# Patient Record
Sex: FEMALE | Race: WHITE | NOT HISPANIC OR LATINO | URBAN - METROPOLITAN AREA
[De-identification: names, ages, dates, MRNs, and addresses within clinical notes are randomized per-mention and may not be internally consistent; named-entity substitution may affect disease eponyms.]

---

## 2017-05-16 ENCOUNTER — OUTPATIENT (OUTPATIENT)
Dept: OUTPATIENT SERVICES | Facility: HOSPITAL | Age: 72
LOS: 1 days | Discharge: HOME | End: 2017-05-16

## 2017-06-28 DIAGNOSIS — C56.9 MALIGNANT NEOPLASM OF UNSPECIFIED OVARY: ICD-10-CM

## 2017-10-11 ENCOUNTER — OUTPATIENT (OUTPATIENT)
Dept: OUTPATIENT SERVICES | Facility: HOSPITAL | Age: 72
LOS: 1 days | Discharge: HOME | End: 2017-10-11

## 2017-10-11 DIAGNOSIS — E11.9 TYPE 2 DIABETES MELLITUS WITHOUT COMPLICATIONS: ICD-10-CM

## 2017-10-11 DIAGNOSIS — C56.9 MALIGNANT NEOPLASM OF UNSPECIFIED OVARY: ICD-10-CM

## 2017-10-11 DIAGNOSIS — E13.10 OTHER SPECIFIED DIABETES MELLITUS WITH KETOACIDOSIS WITHOUT COMA: ICD-10-CM

## 2017-10-11 DIAGNOSIS — S09.90XA UNSPECIFIED INJURY OF HEAD, INITIAL ENCOUNTER: ICD-10-CM

## 2017-10-11 DIAGNOSIS — S32.000A WEDGE COMPRESSION FRACTURE OF UNSPECIFIED LUMBAR VERTEBRA, INITIAL ENCOUNTER FOR CLOSED FRACTURE: ICD-10-CM
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
## 2017-10-11 DIAGNOSIS — Z85.43 PERSONAL HISTORY OF MALIGNANT NEOPLASM OF OVARY: ICD-10-CM

## 2018-06-25 ENCOUNTER — EMERGENCY (EMERGENCY)
Facility: HOSPITAL | Age: 73
LOS: 0 days | Discharge: HOME | End: 2018-06-26
Attending: EMERGENCY MEDICINE | Admitting: EMERGENCY MEDICINE
Payer: MEDICARE

## 2018-06-25 VITALS
DIASTOLIC BLOOD PRESSURE: 75 MMHG | SYSTOLIC BLOOD PRESSURE: 158 MMHG | HEART RATE: 104 BPM | OXYGEN SATURATION: 95 % | TEMPERATURE: 98 F | RESPIRATION RATE: 20 BRPM | WEIGHT: 158.95 LBS

## 2018-06-25 DIAGNOSIS — R11.0 NAUSEA: ICD-10-CM

## 2018-06-25 DIAGNOSIS — I50.9 HEART FAILURE, UNSPECIFIED: ICD-10-CM

## 2018-06-25 DIAGNOSIS — Z88.0 ALLERGY STATUS TO PENICILLIN: ICD-10-CM

## 2018-06-25 DIAGNOSIS — I11.0 HYPERTENSIVE HEART DISEASE WITH HEART FAILURE: ICD-10-CM

## 2018-06-25 DIAGNOSIS — Z87.09 PERSONAL HISTORY OF OTHER DISEASES OF THE RESPIRATORY SYSTEM: ICD-10-CM

## 2018-06-25 DIAGNOSIS — E11.9 TYPE 2 DIABETES MELLITUS WITHOUT COMPLICATIONS: ICD-10-CM

## 2018-06-25 DIAGNOSIS — R06.02 SHORTNESS OF BREATH: ICD-10-CM

## 2018-06-25 DIAGNOSIS — Z79.84 LONG TERM (CURRENT) USE OF ORAL HYPOGLYCEMIC DRUGS: ICD-10-CM

## 2018-06-25 DIAGNOSIS — Z79.899 OTHER LONG TERM (CURRENT) DRUG THERAPY: ICD-10-CM

## 2018-06-25 LAB
ALBUMIN SERPL ELPH-MCNC: 4.2 G/DL — SIGNIFICANT CHANGE UP (ref 3.5–5.2)
ALP SERPL-CCNC: 82 U/L — SIGNIFICANT CHANGE UP (ref 30–115)
ALT FLD-CCNC: 34 U/L — SIGNIFICANT CHANGE UP (ref 0–41)
ANION GAP SERPL CALC-SCNC: 15 MMOL/L — HIGH (ref 7–14)
APTT BLD: 26.7 SEC — LOW (ref 27–39.2)
AST SERPL-CCNC: 33 U/L — SIGNIFICANT CHANGE UP (ref 0–41)
BILIRUB SERPL-MCNC: 0.4 MG/DL — SIGNIFICANT CHANGE UP (ref 0.2–1.2)
BUN SERPL-MCNC: 24 MG/DL — HIGH (ref 10–20)
CALCIUM SERPL-MCNC: 9.9 MG/DL — SIGNIFICANT CHANGE UP (ref 8.5–10.1)
CHLORIDE SERPL-SCNC: 101 MMOL/L — SIGNIFICANT CHANGE UP (ref 98–110)
CK MB CFR SERPL CALC: 3.2 NG/ML — SIGNIFICANT CHANGE UP (ref 0.6–6.3)
CK SERPL-CCNC: 113 U/L — SIGNIFICANT CHANGE UP (ref 0–225)
CO2 SERPL-SCNC: 25 MMOL/L — SIGNIFICANT CHANGE UP (ref 17–32)
CREAT SERPL-MCNC: 1 MG/DL — SIGNIFICANT CHANGE UP (ref 0.7–1.5)
GLUCOSE SERPL-MCNC: 128 MG/DL — HIGH (ref 70–99)
HCT VFR BLD CALC: 32.6 % — LOW (ref 37–47)
HGB BLD-MCNC: 9.8 G/DL — LOW (ref 12–16)
INR BLD: 1.06 RATIO — SIGNIFICANT CHANGE UP (ref 0.65–1.3)
LACTATE SERPL-SCNC: 1.6 MMOL/L — SIGNIFICANT CHANGE UP (ref 0.5–2.2)
MCHC RBC-ENTMCNC: 27.8 PG — SIGNIFICANT CHANGE UP (ref 27–31)
MCHC RBC-ENTMCNC: 30.1 G/DL — LOW (ref 32–37)
MCV RBC AUTO: 92.4 FL — SIGNIFICANT CHANGE UP (ref 81–99)
NRBC # BLD: 0 /100 WBCS — SIGNIFICANT CHANGE UP (ref 0–0)
NT-PROBNP SERPL-SCNC: 1327 PG/ML — HIGH (ref 0–300)
PLATELET # BLD AUTO: 186 K/UL — SIGNIFICANT CHANGE UP (ref 130–400)
POTASSIUM SERPL-MCNC: 4.7 MMOL/L — SIGNIFICANT CHANGE UP (ref 3.5–5)
POTASSIUM SERPL-SCNC: 4.7 MMOL/L — SIGNIFICANT CHANGE UP (ref 3.5–5)
PROT SERPL-MCNC: 6.8 G/DL — SIGNIFICANT CHANGE UP (ref 6–8)
PROTHROM AB SERPL-ACNC: 11.5 SEC — SIGNIFICANT CHANGE UP (ref 9.95–12.87)
RBC # BLD: 3.53 M/UL — LOW (ref 4.2–5.4)
RBC # FLD: 18.1 % — HIGH (ref 11.5–14.5)
SODIUM SERPL-SCNC: 141 MMOL/L — SIGNIFICANT CHANGE UP (ref 135–146)
TROPONIN T SERPL-MCNC: <0.01 NG/ML — SIGNIFICANT CHANGE UP
WBC # BLD: 7.63 K/UL — SIGNIFICANT CHANGE UP (ref 4.8–10.8)
WBC # FLD AUTO: 7.63 K/UL — SIGNIFICANT CHANGE UP (ref 4.8–10.8)

## 2018-06-25 PROCEDURE — 93970 EXTREMITY STUDY: CPT | Mod: 26

## 2018-06-25 RX ORDER — METFORMIN HYDROCHLORIDE 850 MG/1
1 TABLET ORAL
Qty: 0 | Refills: 0 | COMMUNITY

## 2018-06-25 RX ORDER — BENAZEPRIL HYDROCHLORIDE 40 MG/1
1 TABLET ORAL
Qty: 0 | Refills: 0 | COMMUNITY

## 2018-06-25 RX ORDER — INSULIN DETEMIR 100/ML (3)
0 INSULIN PEN (ML) SUBCUTANEOUS
Qty: 0 | Refills: 0 | COMMUNITY

## 2018-06-25 RX ORDER — FUROSEMIDE 40 MG
40 TABLET ORAL ONCE
Qty: 0 | Refills: 0 | Status: COMPLETED | OUTPATIENT
Start: 2018-06-25 | End: 2018-06-25

## 2018-06-25 NOTE — ED PROVIDER NOTE - NS ED ROS FT
Review of Systems    Constitutional: (-) fever  Cardiovascular: (-) chest pain, (-) syncope  Respiratory: (+) cough, (+) shortness of breath  Gastrointestinal: (-) vomiting, (-) diarrhea  Musculoskeletal: (-) neck pain, (-) back pain  Integumentary: (-) rash, (+) edema  Neurological: (-) headache

## 2018-06-25 NOTE — ED PROVIDER NOTE - MEDICAL DECISION MAKING DETAILS
Patient to be discharged from ED. Any available test results were discussed with patient and/or family. Verbal instructions given, including instructions to return to ED immediately for any new, worsening, or concerning symptoms. Patient endorsed understanding. Written discharge instructions additionally given, including follow-up plan.   telma vyas - will see in office tomorrow -  start  lasix 20mg

## 2018-06-25 NOTE — ED PROVIDER NOTE - PROGRESS NOTE DETAILS
I personally evaluated the patient. I reviewed the Resident’s or Physician Assistant’s note (as assigned above), and agree with the findings and plan except as documented in my note.  73yF hx of DM, ovarian CA and stomach CA, in remission, not currently on any chemotherapy presents to ED for difficulty breathing.  Pt recently diagnosed with bronchitis 2 weeks ago, no abx started at that time.  Pt over that time having increased cough, clear production.  Pt since yesterday difficulty breathing ad pain to left chest with coughing.  Pt contacted PMD, was advised ED visit for CXray.  Pt notes feet swelling over last 5 days.  (+)Chest heaviness.  (+)dyspnea on exertion over last 5 days.  No fever.  No abd pain.  No n/v/d.  No rash.  No new mdx.  ON EXAM: Pt is awake and alert, non toxic.  CVS RRR.  Lungs CTA b/l. no resp distress.  Abd soft nt/nd.  1+ b/l LE edema.  PLAN:  Labs, EKG, CXray.

## 2018-06-25 NOTE — ED PROVIDER NOTE - OBJECTIVE STATEMENT
74 y/o F with PMH DM, HTN presents with SOB, NORIEGA, nausea, b/l LE swelling x 5 days. +cough x wks. Denies CP, palpitations, back pain, abdominal pain, v/d, black or bloody stools, fevers, sweats, chills, HA, vision changes, sore throat/difficulty swallowing, trauma, fall, cough, recent travel, recent illness, sick contacts, leg pain, urinary symptoms, rash.

## 2018-06-25 NOTE — ED PROVIDER NOTE - PHYSICAL EXAMINATION
PHYSICAL EXAM:    GENERAL: Alert, appears stated age, well appearing, non-toxic  SKIN: Warm, pink and dry. MMM.   EYE: Normal lids/conjunctiva  ENT: Normal hearing, patent oropharynx without erythema or exudate  NECK: +supple. No meningismus, or JVD, +Trachea midline.   Pulm: Bilateral BS, normal resp effort, no wheezes, stridor, or retractions  CV: RRR, no M/R/G, 2+ pulses. no TTP of precordium   Abd: soft, non-tender, non-distended  Mskel: no erythema, cyanosis. +b/l LE 1-2+edema up to abdomen. no calf tenderness.   Neuro: AAOx3, 5/5 strength throughout. normal gait.

## 2018-06-26 VITALS
SYSTOLIC BLOOD PRESSURE: 131 MMHG | DIASTOLIC BLOOD PRESSURE: 77 MMHG | OXYGEN SATURATION: 96 % | TEMPERATURE: 98 F | RESPIRATION RATE: 16 BRPM | HEART RATE: 89 BPM

## 2018-06-26 RX ORDER — FUROSEMIDE 40 MG
1 TABLET ORAL
Qty: 5 | Refills: 0 | OUTPATIENT
Start: 2018-06-26 | End: 2018-06-30

## 2018-06-26 RX ADMIN — Medication 40 MILLIGRAM(S): at 00:20

## 2018-09-12 ENCOUNTER — OUTPATIENT (OUTPATIENT)
Dept: OUTPATIENT SERVICES | Facility: HOSPITAL | Age: 73
LOS: 1 days | Discharge: HOME | End: 2018-09-12

## 2018-09-12 DIAGNOSIS — R06.89 OTHER ABNORMALITIES OF BREATHING: ICD-10-CM

## 2018-09-12 PROBLEM — J40 BRONCHITIS, NOT SPECIFIED AS ACUTE OR CHRONIC: Chronic | Status: ACTIVE | Noted: 2018-06-25

## 2018-09-12 PROBLEM — E11.9 TYPE 2 DIABETES MELLITUS WITHOUT COMPLICATIONS: Chronic | Status: ACTIVE | Noted: 2018-06-25

## 2018-10-25 LAB — GLUCOSE BLDC GLUCOMTR-MCNC: 189 MG/DL — HIGH (ref 70–99)

## 2018-10-30 LAB — GLUCOSE BLDC GLUCOMTR-MCNC: 318 MG/DL — HIGH (ref 70–99)

## 2018-11-01 LAB
GLUCOSE BLDC GLUCOMTR-MCNC: 60 MG/DL — LOW (ref 70–99)
GLUCOSE BLDC GLUCOMTR-MCNC: 61 MG/DL — LOW (ref 70–99)
GLUCOSE BLDC GLUCOMTR-MCNC: 91 MG/DL — SIGNIFICANT CHANGE UP (ref 70–99)

## 2018-12-20 ENCOUNTER — OUTPATIENT (OUTPATIENT)
Dept: OUTPATIENT SERVICES | Facility: HOSPITAL | Age: 73
LOS: 1 days | Discharge: HOME | End: 2018-12-20

## 2018-12-20 DIAGNOSIS — I50.41 ACUTE COMBINED SYSTOLIC (CONGESTIVE) AND DIASTOLIC (CONGESTIVE) HEART FAILURE: ICD-10-CM

## 2019-01-15 ENCOUNTER — INPATIENT (INPATIENT)
Facility: HOSPITAL | Age: 74
LOS: 7 days | Discharge: NURSING, REHAB OR LONG-TERM CARE | End: 2019-01-23
Attending: COLON & RECTAL SURGERY | Admitting: COLON & RECTAL SURGERY
Payer: MEDICARE

## 2019-01-15 VITALS
OXYGEN SATURATION: 99 % | SYSTOLIC BLOOD PRESSURE: 95 MMHG | HEIGHT: 59 IN | RESPIRATION RATE: 18 BRPM | TEMPERATURE: 97 F | DIASTOLIC BLOOD PRESSURE: 55 MMHG | WEIGHT: 149.91 LBS | HEART RATE: 76 BPM

## 2019-01-15 LAB
ALBUMIN SERPL ELPH-MCNC: 4.4 G/DL — SIGNIFICANT CHANGE UP (ref 3.5–5.2)
ALP SERPL-CCNC: 74 U/L — SIGNIFICANT CHANGE UP (ref 30–115)
ALT FLD-CCNC: 18 U/L — SIGNIFICANT CHANGE UP (ref 0–41)
ANION GAP SERPL CALC-SCNC: 13 MMOL/L — SIGNIFICANT CHANGE UP (ref 7–14)
APPEARANCE UR: CLEAR — SIGNIFICANT CHANGE UP
AST SERPL-CCNC: 20 U/L — SIGNIFICANT CHANGE UP (ref 0–41)
BASOPHILS # BLD AUTO: 0.04 K/UL — SIGNIFICANT CHANGE UP (ref 0–0.2)
BASOPHILS NFR BLD AUTO: 0.4 % — SIGNIFICANT CHANGE UP (ref 0–1)
BILIRUB SERPL-MCNC: <0.2 MG/DL — SIGNIFICANT CHANGE UP (ref 0.2–1.2)
BILIRUB UR-MCNC: NEGATIVE — SIGNIFICANT CHANGE UP
BUN SERPL-MCNC: 26 MG/DL — HIGH (ref 10–20)
CALCIUM SERPL-MCNC: 10.5 MG/DL — HIGH (ref 8.5–10.1)
CHLORIDE SERPL-SCNC: 94 MMOL/L — LOW (ref 98–110)
CO2 SERPL-SCNC: 31 MMOL/L — SIGNIFICANT CHANGE UP (ref 17–32)
COLOR SPEC: YELLOW — SIGNIFICANT CHANGE UP
CREAT SERPL-MCNC: 1.1 MG/DL — SIGNIFICANT CHANGE UP (ref 0.7–1.5)
DIFF PNL FLD: NEGATIVE — SIGNIFICANT CHANGE UP
EOSINOPHIL # BLD AUTO: 0.1 K/UL — SIGNIFICANT CHANGE UP (ref 0–0.7)
EOSINOPHIL NFR BLD AUTO: 1 % — SIGNIFICANT CHANGE UP (ref 0–8)
EPI CELLS # UR: ABNORMAL /HPF
GLUCOSE SERPL-MCNC: 57 MG/DL — LOW (ref 70–99)
GLUCOSE UR QL: NEGATIVE MG/DL — SIGNIFICANT CHANGE UP
HCT VFR BLD CALC: 31.7 % — LOW (ref 37–47)
HGB BLD-MCNC: 9.2 G/DL — LOW (ref 12–16)
IMM GRANULOCYTES NFR BLD AUTO: 0.4 % — HIGH (ref 0.1–0.3)
KETONES UR-MCNC: NEGATIVE — SIGNIFICANT CHANGE UP
LACTATE SERPL-SCNC: 3.2 MMOL/L — HIGH (ref 0.5–2.2)
LEUKOCYTE ESTERASE UR-ACNC: NEGATIVE — SIGNIFICANT CHANGE UP
LYMPHOCYTES # BLD AUTO: 3.76 K/UL — HIGH (ref 1.2–3.4)
LYMPHOCYTES # BLD AUTO: 36.2 % — SIGNIFICANT CHANGE UP (ref 20.5–51.1)
MCHC RBC-ENTMCNC: 24.9 PG — LOW (ref 27–31)
MCHC RBC-ENTMCNC: 29 G/DL — LOW (ref 32–37)
MCV RBC AUTO: 85.9 FL — SIGNIFICANT CHANGE UP (ref 81–99)
MONOCYTES # BLD AUTO: 0.77 K/UL — HIGH (ref 0.1–0.6)
MONOCYTES NFR BLD AUTO: 7.4 % — SIGNIFICANT CHANGE UP (ref 1.7–9.3)
NEUTROPHILS # BLD AUTO: 5.68 K/UL — SIGNIFICANT CHANGE UP (ref 1.4–6.5)
NEUTROPHILS NFR BLD AUTO: 54.6 % — SIGNIFICANT CHANGE UP (ref 42.2–75.2)
NITRITE UR-MCNC: NEGATIVE — SIGNIFICANT CHANGE UP
NRBC # BLD: 0 /100 WBCS — SIGNIFICANT CHANGE UP (ref 0–0)
PH UR: 8.5 — SIGNIFICANT CHANGE UP (ref 5–8)
PLATELET # BLD AUTO: 252 K/UL — SIGNIFICANT CHANGE UP (ref 130–400)
POTASSIUM SERPL-MCNC: 4.2 MMOL/L — SIGNIFICANT CHANGE UP (ref 3.5–5)
POTASSIUM SERPL-SCNC: 4.2 MMOL/L — SIGNIFICANT CHANGE UP (ref 3.5–5)
PROT SERPL-MCNC: 7 G/DL — SIGNIFICANT CHANGE UP (ref 6–8)
PROT UR-MCNC: 30 MG/DL
RBC # BLD: 3.69 M/UL — LOW (ref 4.2–5.4)
RBC # FLD: 17 % — HIGH (ref 11.5–14.5)
SODIUM SERPL-SCNC: 138 MMOL/L — SIGNIFICANT CHANGE UP (ref 135–146)
SP GR SPEC: 1.02 — SIGNIFICANT CHANGE UP (ref 1.01–1.03)
TROPONIN T SERPL-MCNC: <0.01 NG/ML — SIGNIFICANT CHANGE UP
UROBILINOGEN FLD QL: 0.2 MG/DL — SIGNIFICANT CHANGE UP (ref 0.2–0.2)
WBC # BLD: 10.39 K/UL — SIGNIFICANT CHANGE UP (ref 4.8–10.8)
WBC # FLD AUTO: 10.39 K/UL — SIGNIFICANT CHANGE UP (ref 4.8–10.8)
WBC UR QL: SIGNIFICANT CHANGE UP /HPF

## 2019-01-15 PROCEDURE — 99221 1ST HOSP IP/OBS SF/LOW 40: CPT

## 2019-01-15 PROCEDURE — 99223 1ST HOSP IP/OBS HIGH 75: CPT

## 2019-01-15 RX ORDER — HEPARIN SODIUM 5000 [USP'U]/ML
5000 INJECTION INTRAVENOUS; SUBCUTANEOUS EVERY 8 HOURS
Qty: 0 | Refills: 0 | Status: DISCONTINUED | OUTPATIENT
Start: 2019-01-15 | End: 2019-01-17

## 2019-01-15 RX ORDER — DEXTROSE 50 % IN WATER 50 %
50 SYRINGE (ML) INTRAVENOUS ONCE
Qty: 0 | Refills: 0 | Status: COMPLETED | OUTPATIENT
Start: 2019-01-15 | End: 2019-01-15

## 2019-01-15 RX ORDER — DEXTROSE 50 % IN WATER 50 %
25 SYRINGE (ML) INTRAVENOUS ONCE
Qty: 0 | Refills: 0 | Status: DISCONTINUED | OUTPATIENT
Start: 2019-01-15 | End: 2019-01-17

## 2019-01-15 RX ORDER — PANTOPRAZOLE SODIUM 20 MG/1
40 TABLET, DELAYED RELEASE ORAL DAILY
Qty: 0 | Refills: 0 | Status: DISCONTINUED | OUTPATIENT
Start: 2019-01-15 | End: 2019-01-17

## 2019-01-15 RX ORDER — MORPHINE SULFATE 50 MG/1
1 CAPSULE, EXTENDED RELEASE ORAL EVERY 6 HOURS
Qty: 0 | Refills: 0 | Status: DISCONTINUED | OUTPATIENT
Start: 2019-01-15 | End: 2019-01-17

## 2019-01-15 RX ORDER — LIDOCAINE HCL 20 MG/ML
8 VIAL (ML) INJECTION ONCE
Qty: 0 | Refills: 0 | Status: COMPLETED | OUTPATIENT
Start: 2019-01-15 | End: 2019-01-15

## 2019-01-15 RX ORDER — SODIUM CHLORIDE 9 MG/ML
1000 INJECTION, SOLUTION INTRAVENOUS
Qty: 0 | Refills: 0 | Status: DISCONTINUED | OUTPATIENT
Start: 2019-01-15 | End: 2019-01-17

## 2019-01-15 RX ORDER — SODIUM CHLORIDE 9 MG/ML
1000 INJECTION, SOLUTION INTRAVENOUS
Qty: 0 | Refills: 0 | Status: DISCONTINUED | OUTPATIENT
Start: 2019-01-15 | End: 2019-01-16

## 2019-01-15 RX ORDER — CHLORHEXIDINE GLUCONATE 213 G/1000ML
1 SOLUTION TOPICAL
Qty: 0 | Refills: 0 | Status: DISCONTINUED | OUTPATIENT
Start: 2019-01-15 | End: 2019-01-17

## 2019-01-15 RX ORDER — DEXTROSE 50 % IN WATER 50 %
12.5 SYRINGE (ML) INTRAVENOUS ONCE
Qty: 0 | Refills: 0 | Status: DISCONTINUED | OUTPATIENT
Start: 2019-01-15 | End: 2019-01-17

## 2019-01-15 RX ORDER — ONDANSETRON 8 MG/1
4 TABLET, FILM COATED ORAL ONCE
Qty: 0 | Refills: 0 | Status: COMPLETED | OUTPATIENT
Start: 2019-01-15 | End: 2019-01-15

## 2019-01-15 RX ORDER — METOPROLOL TARTRATE 50 MG
2.5 TABLET ORAL EVERY 8 HOURS
Qty: 0 | Refills: 0 | Status: DISCONTINUED | OUTPATIENT
Start: 2019-01-15 | End: 2019-01-17

## 2019-01-15 RX ORDER — GLUCAGON INJECTION, SOLUTION 0.5 MG/.1ML
1 INJECTION, SOLUTION SUBCUTANEOUS ONCE
Qty: 0 | Refills: 0 | Status: DISCONTINUED | OUTPATIENT
Start: 2019-01-15 | End: 2019-01-17

## 2019-01-15 RX ORDER — INSULIN LISPRO 100/ML
VIAL (ML) SUBCUTANEOUS
Qty: 0 | Refills: 0 | Status: DISCONTINUED | OUTPATIENT
Start: 2019-01-15 | End: 2019-01-17

## 2019-01-15 RX ORDER — SODIUM CHLORIDE 9 MG/ML
1000 INJECTION, SOLUTION INTRAVENOUS ONCE
Qty: 0 | Refills: 0 | Status: COMPLETED | OUTPATIENT
Start: 2019-01-15 | End: 2019-01-15

## 2019-01-15 RX ORDER — ONDANSETRON 8 MG/1
4 TABLET, FILM COATED ORAL EVERY 8 HOURS
Qty: 0 | Refills: 0 | Status: DISCONTINUED | OUTPATIENT
Start: 2019-01-15 | End: 2019-01-17

## 2019-01-15 RX ORDER — DEXTROSE 50 % IN WATER 50 %
15 SYRINGE (ML) INTRAVENOUS ONCE
Qty: 0 | Refills: 0 | Status: DISCONTINUED | OUTPATIENT
Start: 2019-01-15 | End: 2019-01-17

## 2019-01-15 RX ADMIN — SODIUM CHLORIDE 100 MILLILITER(S): 9 INJECTION, SOLUTION INTRAVENOUS at 15:31

## 2019-01-15 RX ADMIN — ONDANSETRON 4 MILLIGRAM(S): 8 TABLET, FILM COATED ORAL at 15:48

## 2019-01-15 RX ADMIN — Medication 8 MILLILITER(S): at 17:17

## 2019-01-15 RX ADMIN — ONDANSETRON 4 MILLIGRAM(S): 8 TABLET, FILM COATED ORAL at 23:20

## 2019-01-15 RX ADMIN — SODIUM CHLORIDE 1000 MILLILITER(S): 9 INJECTION, SOLUTION INTRAVENOUS at 15:30

## 2019-01-15 RX ADMIN — Medication 50 MILLILITER(S): at 14:27

## 2019-01-15 RX ADMIN — SODIUM CHLORIDE 1000 MILLILITER(S): 9 INJECTION, SOLUTION INTRAVENOUS at 13:19

## 2019-01-15 RX ADMIN — SODIUM CHLORIDE 100 MILLILITER(S): 9 INJECTION, SOLUTION INTRAVENOUS at 22:18

## 2019-01-15 NOTE — ED ADULT NURSE REASSESSMENT NOTE - NS ED NURSE REASSESS COMMENT FT1
Pt going to [facility]. Vitals WNL. Pt in no distress. Report given to [facility]. Pt waiting for ambulance

## 2019-01-15 NOTE — H&P ADULT - HISTORY OF PRESENT ILLNESS
HPI:   73y Female PMH of DMT2, CHF, Ovarian CA s/p BARON and BSO 2y ago, gastric CA s/p chemo follows at [facility] Oncologist Dr. Pratt, has chronically incarcerated ventral hernia containing loops of small bowel, presents with abdominal pain that has progressively worsened over 1 week with associated nausea, decreased appetite, and infrequent, small BM, no flatus. Patient had CT A/P demonstrating loops of dilated small bowel concerning for SBO, TP is seen in hernia neck, with edema in hernia sac. LA 3.2. No fevers, HD Stable. Patient poor historian, most of F/U at [facility], unsure of names of care providers. Not sure when last C-scope/endoscopy was.   Transferred to [place] for evaluation.    PAST MEDICAL & SURGICAL HISTORY:  Bronchitis  Diabetes mellitus, type 2

## 2019-01-15 NOTE — ED PROVIDER NOTE - OBJECTIVE STATEMENT
73 year old female with a pmh of dm chf h/o ovarian & stomach cancer (remission 2 year) presents here c/o abdominal pain x 1 week. Patient waiting several days before coming to the ED due the fact that she thought it was gas. Patient continued to have pain which promoted her to come to the ED. Patient admits to nausea but no vomiting and has been having bowel movements & passing gas. Denies fever chills cough cp urinary frequency urgency or burning

## 2019-01-15 NOTE — CONSULT NOTE ADULT - ATTENDING COMMENTS
Pt seen and examined in ED approx 1630hrs.  Pt alert and stable, still c/o crampy abd pain., some nausea, no emesis.  History is above, prior ovarian ca was Rx'ed with p.o. chemoRx but no RT.  Abd is obese and somewhat distended, minimal diffuse tenderness, active BS.  Very large chronically incarcerated hernia in midline extending to left, not tender, no erythema/crepitus/induration.  No CVAT, no other hernias noted.  CT reviewed with images, labs noted.  Plan is as stated above, will d/w surgical team @ [facility].

## 2019-01-15 NOTE — ED PROVIDER NOTE - PHYSICAL EXAMINATION
CONSTITUTIONAL: WA / WN / NAD  HEAD: NCAT  EYES: PERRL; EOMI;   ENT: Normal pharynx; mucous membranes pink/moist, no erythema.  NECK: Supple; no meningeal signs  CARD: RRR; nl S1/S2; no M/R/G.   RESP: Respiratory rate and effort are normal; breath sounds clear and equal bilaterally.  ABD: Soft, ND + suprapubic & llq ttp. No CVA tenderness  MSK/EXT: No gross deformities; full range of motion.  SKIN: Warm and dry;   NEURO: AAOx3  PSYCH: Memory Intact, Normal Affect

## 2019-01-15 NOTE — ED PROCEDURE NOTE - CPROC ED GASTRIC INTUB DETAIL1
The nasogastric tube (see size above) was inserted via the anatomic location.
The nasogastric tube (see size above) was inserted via the anatomic location.

## 2019-01-15 NOTE — H&P ADULT - NSHPPHYSICALEXAM_GEN_ALL_CORE
Gen: a&ox3  Lungs: clear b/l  Abd: soft, mild distention, large ventral hernia, mild tenderness, rectal exam negative, no skin changes, nonperitoneal

## 2019-01-15 NOTE — H&P ADULT - ATTENDING COMMENTS
74 yo female with extensive medical history and surgical history as above stated.  Developed pain at the incisional hernia site in her LLQ during PT.  CT with partial SBO and incarcerated hernia in her LLQ.    Abdomen is soft, NT, mildly distended. Mildly tender hernia sac in LLQ.  WC 10.3    A/P:    Incarcerated hernia, no signs of strangulation.  NGT, NPO, IVF.  ICU evaluation.  Surgery when optimized.  D/w Risks, benefits and consequences with patient and her niece Malinda.

## 2019-01-15 NOTE — ED PROVIDER NOTE - NS ED ROS FT
Constitutional: See HPI.  Eyes: No visual changes  ENMT:  No neck pain  Cardiac: No cp  Respiratory: No cough   GI: see hpi  : No dysuria, frequency or burning.   MS: No myalgia, muscle weakness, joint pain or back pain.  Neuro: No headache or weakness. No LOC.  Skin: No skin rash.

## 2019-01-15 NOTE — CONSULT NOTE ADULT - SUBJECTIVE AND OBJECTIVE BOX
Consultation Note  =====================================================    HPI:   73y Female PMH of DMT2, CHF, Ovarian CA s/p BARON and BSO 2y ago, gastric CA s/p chemo follows at [facility] Oncologist Dr. Pratt, has chronically incarcerated ventral hernia containing loops of small bowel, presents with abdominal pain that has progressively worsened over 1 week with associated nausea, decreased appetite, and infrequent, small BM, no flatus. Patient had CT A/P demonstrating loops of dilated small bowel concerning for SBO, TP is seen in hernia neck, with edema in hernia sac. LA 3.2. No fevers, HD Stable. Patient poor historian, most of F/U at [facility], unsure of names of care providers. Not sure when last C-scope/endoscopy was.     PAST MEDICAL & SURGICAL HISTORY:  Bronchitis  Diabetes mellitus, type 2    Home Meds: Home Medications:  benazepril 10 mg oral tablet: 1 tab(s) orally once a day (2018 18:49)  GlipiZIDE XL 10 mg oral tablet, extended release: 1 tab(s) orally once a day (2018 18:49)  Levemir FlexTouch:  (2018 18:49)  metFORMIN 1000 mg oral tablet: 1 tab(s) orally 2 times a day (2018 18:49)    Allergies: Allergies  penicillin (Rash)  Intolerances    Soc:   Denies Tobacco/EtOH/Substance use  Advanced Directives: Presumed Full Code     ROS:    REVIEW OF SYSTEMS  [x] A ten-point review of systems was otherwise negative except as noted.    CURRENT MEDICATIONS:   --------------------------------------------------------------------------------------  Gastrointestinal Medications  dextrose 5% + sodium chloride 0.45%. 1000 milliLiter(s) IV Continuous <Continuous>    Topical/Other Medications  lidocaine 2% Injection for Nebulization 8 milliLiter(s) Nebulizer once  --------------------------------------------------------------------------------------  VITAL SIGNS, INS/OUTS (last 24 hours):  --------------------------------------------------------------------------------------  ICU Vital Signs Last 24 Hrs  T(C): 36.5 (15 Cortes 2019 16:23), Max: 36.5 (15 Cortes 2019 16:23)  T(F): 97.7 (15 Cortes 2019 16:23), Max: 97.7 (15 Cortes 2019 16:23)  HR: 70 (15 Cortes 2019 13:31) (70 - 76)  BP: 140/62 (15 Cortes 2019 16:23) (95/55 - 140/62)  RR: 18 (15 Cortes 2019 16:23) (18 - 18)  SpO2: 99% (15 Cortes 2019 16:23) (99% - 99%)  --------------------------------------------------------------------------------------  PHYSICAL EXAM  General: NAD, AAOx3, calm and cooperative  HEENT: NCAT, TEREZA, EOMI, Trachea ML, Neck supple  Cardiac: RRR S1, S2, no Murmurs, rubs or gallops  Respiratory: CTAB, normal respiratory effort, breath sounds equal BL, no wheeze, rhonchi or crackles  Abdomen: Soft, +distended, LLQ tenderness along lateral inferior aspect of hernia, +bowel sounds (hyperactive), no guarding/rigidity, no rebound  Skin: Warm/dry, normal color    LABS  --------------------------------------------------------------------------------------  CAPILLARY BLOOD GLUCOSE  POCT Blood Glucose.: 145 mg/dL (15 Cortes 2019 14:30)  POCT Blood Glucose.: 32 mg/dL (15 Cortes 2019 14:08)                          9.2    10.39 )-----------( 252      ( 15 Cortes 2019 10:30 )             31.7       Auto Neutrophil %: 54.6 % (01-15-19 @ 10:30)  Auto Immature Granulocyte %: 0.4 % (01-15-19 @ 10:30)    01-15  138  |  94<L>  |  26<H>  ----------------------------<  57<L>  4.2   |  31  |  1.1    Calcium, Total Serum: 10.5 mg/dL (01-15-19 @ 10:30)    LFTs:     7.0  | <0.2 | 20       ------------------[74      ( 15 Cortes 2019 10:30 )  4.4  | x    | 18          Lipase:x      Amylase:x        Lactate, Blood: 3.2 mmol/L (01-15-19 @ 10:30)  Coags:    CARDIAC MARKERS ( 15 Cortes 2019 10:30 )  x     / <0.01 ng/mL / x     / x     / x        Urinalysis Basic - ( 15 Cortes 2019 10:30 )  Color: Yellow / Appearance: Clear / S.020 / pH: x  Gluc: x / Ketone: Negative  / Bili: Negative / Urobili: 0.2 mg/dL   Blood: x / Protein: 30 mg/dL / Nitrite: Negative   Leuk Esterase: Negative / RBC: x / WBC 3-5 /HPF   Sq Epi: x / Non Sq Epi: Moderate /HPF / Bacteria: x    --------------------------------------------------------------------------------------  IMAGING RESULTS  < from: CT Abdomen and Pelvis w/ IV Cont (01.15.19 @ 14:10) >  IMPRESSION:   Multiple dilated loops of small bowel, leading up to a transition point   located within a large ventral wall abdominal hernia. Findings are   consistent with small bowel obstruction.  There is a 6 mm hypodensity at the junction of the body and tail the   pancreas, incompletely evaluated. This may represent a side branch I PMN.   However, further evaluation with pancreatic MRI with and without contrast   and MRCP imaging is recommended  < end of copied text >  ---------------------------------------------------------------------------------------

## 2019-01-15 NOTE — H&P ADULT - NSHPLABSRESULTS_GEN_ALL_CORE
9.2    10.39 )-----------( 252      ( 15 Cortes 2019 10:30 )             31.7   01-15    138  |  94<L>  |  26<H>  ----------------------------<  57<L>  4.2   |  31  |  1.1    Ca    10.5<H>      15 Cortes 2019 10:30    TPro  7.0  /  Alb  4.4  /  TBili  <0.2  /  DBili  x   /  AST  20  /  ALT  18  /  AlkPhos  74  01-15    Lactate 3.2    < from: CT Abdomen and Pelvis w/ IV Cont (01.15.19 @ 14:10) >    IMPRESSION:     Multiple dilated loops of small bowel, leading up to a transition point   located within a large ventral wall abdominal hernia. Findings are   consistent with small bowel obstruction.    There is a 6 mm hypodensity at the junction of the body and tail the   pancreas, incompletely evaluated. This may represent a side branch I PMN.   However, further evaluation with pancreatic MRI with and without contrast   and MRCP imaging is recommended    < end of copied text >

## 2019-01-15 NOTE — H&P ADULT - ASSESSMENT
74 yo F with SBO and t-point in large chronically incarcerated incisional hernia    Plan  Admit to surgery  NPO  IV hydration 100 LR/h  Repeat lactate and labs  Serial exams  Nonop mngt for now, might need ex lap and hernia repair in next 24-48h if no improvement  Evaluated and discussed with Dr. Alvarez  Discussed with ED

## 2019-01-15 NOTE — ED ADULT NURSE REASSESSMENT NOTE - NS ED NURSE REASSESS COMMENT FT1
2200: pt transferred from [facility] for admission for Small Bowel Obstruction. Pt was c/o abdominal pain  x 1 week and nausea, no vomiting. Pt received with # 20g placed in left AC from [facility]. Pt Alert/ oriented x 3.

## 2019-01-15 NOTE — CONSULT NOTE ADULT - ASSESSMENT
ASSESSMENT:  73y Female with multiple medical problems, chronically incarcerated ventral hernia, presents with SBO, transition point seen in hernia neck    PLAN:   Trial of non-operative management  NPO  IVF  NG tube to LCS  Strict I+O  Bolus additional 1L LR  Monitor for HD instability: Tachycardia/hypotension  Medical admission  Pre-op labs: T+S, PT/PTT/INR  EKG  CXR  Cardiac eval. Echo, CHF, follows with Dr. Garcia  Consider medical admission DM, CHF, multiple medical problems    ***Consideration of transfer North as patient may require emergent surgery if no improvement or worsening SBO, likely will require SICU care post operatively    Above plan discussed with Dr. Hyatt patient, and ED team    --------------------------------------------------------------------------------------    01-15-19 @ 17:05

## 2019-01-15 NOTE — ED PROVIDER NOTE - PROGRESS NOTE DETAILS
CT results noted, patient aware of results. Surgery PA aware of consult I personally evaluated the patient. I reviewed the Resident’s or Physician Assistant’s note (as assigned above), and agree with the findings and plan except as documented in my note. c/o abdominal pain. VS noted. Lower abd is distended and TTP, no rebound, no guarding. CT rev’d. Consistent with SBO. Surgery called Dr. Alanis evaluated patient, patient can be admitted to medicine. Spoke with Dr. Dorsey (hospitalist) aware of admission,. Spoke with JUVENTINO Montes from surgery who is requesting patient be transferred north for possible surgery. Jyoti will confirm with dr. Alicea whether to admit her north or transfer ED-ED Spoke with Dr. yip requesting patient be transferred to [place] ED for surgery eval there for possible bowel resection and possible SICU admission. Dr García aware of transfer north. Case endorsed to me by Dr. Hunter -- patient is pending transfer to [facility] ED for possible SICU admission in setting of small bowel obstruction As patient was beging ready to transport, NG tube fell out. NG tube placed again. [place] for [place]BO and possible surgery/SICU admission.  Surgery team called and aware of Pt arrival. Pt with NGT in place and placed to suction.  Pain controlled at this time.  Pt receiving IVF.  Per Dr. Avalos, surg resident - admit to floor under Dr. Alvarez.

## 2019-01-15 NOTE — ED PROCEDURE NOTE - ATTENDING CONTRIBUTION TO CARE
I was present for the key portions of the procedure and available as supervisor for the entire procedure as documented.
I was present for and supervised the key/critical aspects of the procedures performed during the care of the patient.

## 2019-01-16 LAB
ANION GAP SERPL CALC-SCNC: 13 MMOL/L — SIGNIFICANT CHANGE UP (ref 7–14)
APTT BLD: 30.5 SEC — SIGNIFICANT CHANGE UP (ref 27–39.2)
BASOPHILS # BLD AUTO: 0.04 K/UL — SIGNIFICANT CHANGE UP (ref 0–0.2)
BASOPHILS NFR BLD AUTO: 0.5 % — SIGNIFICANT CHANGE UP (ref 0–1)
BLD GP AB SCN SERPL QL: SIGNIFICANT CHANGE UP
BUN SERPL-MCNC: 18 MG/DL — SIGNIFICANT CHANGE UP (ref 10–20)
CALCIUM SERPL-MCNC: 9.4 MG/DL — SIGNIFICANT CHANGE UP (ref 8.5–10.1)
CHLORIDE SERPL-SCNC: 96 MMOL/L — LOW (ref 98–110)
CO2 SERPL-SCNC: 28 MMOL/L — SIGNIFICANT CHANGE UP (ref 17–32)
CREAT SERPL-MCNC: 0.9 MG/DL — SIGNIFICANT CHANGE UP (ref 0.7–1.5)
CULTURE RESULTS: NO GROWTH — SIGNIFICANT CHANGE UP
EOSINOPHIL # BLD AUTO: 0.08 K/UL — SIGNIFICANT CHANGE UP (ref 0–0.7)
EOSINOPHIL NFR BLD AUTO: 1 % — SIGNIFICANT CHANGE UP (ref 0–8)
ESTIMATED AVERAGE GLUCOSE: 169 MG/DL — HIGH (ref 68–114)
GLUCOSE BLDC GLUCOMTR-MCNC: 111 MG/DL — HIGH (ref 70–99)
GLUCOSE BLDC GLUCOMTR-MCNC: 120 MG/DL — HIGH (ref 70–99)
GLUCOSE BLDC GLUCOMTR-MCNC: 189 MG/DL — HIGH (ref 70–99)
GLUCOSE BLDC GLUCOMTR-MCNC: 56 MG/DL — LOW (ref 70–99)
GLUCOSE SERPL-MCNC: 79 MG/DL — SIGNIFICANT CHANGE UP (ref 70–99)
HBA1C BLD-MCNC: 7.5 % — HIGH (ref 4–5.6)
HCT VFR BLD CALC: 28.1 % — LOW (ref 37–47)
HGB BLD-MCNC: 8.4 G/DL — LOW (ref 12–16)
IMM GRANULOCYTES NFR BLD AUTO: 0.5 % — HIGH (ref 0.1–0.3)
INR BLD: 1 RATIO — SIGNIFICANT CHANGE UP (ref 0.65–1.3)
LACTATE SERPL-SCNC: 2 MMOL/L — SIGNIFICANT CHANGE UP (ref 0.5–2.2)
LYMPHOCYTES # BLD AUTO: 2.83 K/UL — SIGNIFICANT CHANGE UP (ref 1.2–3.4)
LYMPHOCYTES # BLD AUTO: 36.9 % — SIGNIFICANT CHANGE UP (ref 20.5–51.1)
MAGNESIUM SERPL-MCNC: 1.9 MG/DL — SIGNIFICANT CHANGE UP (ref 1.8–2.4)
MCHC RBC-ENTMCNC: 25.5 PG — LOW (ref 27–31)
MCHC RBC-ENTMCNC: 29.9 G/DL — LOW (ref 32–37)
MCV RBC AUTO: 85.4 FL — SIGNIFICANT CHANGE UP (ref 81–99)
MONOCYTES # BLD AUTO: 0.71 K/UL — HIGH (ref 0.1–0.6)
MONOCYTES NFR BLD AUTO: 9.3 % — SIGNIFICANT CHANGE UP (ref 1.7–9.3)
NEUTROPHILS # BLD AUTO: 3.96 K/UL — SIGNIFICANT CHANGE UP (ref 1.4–6.5)
NEUTROPHILS NFR BLD AUTO: 51.8 % — SIGNIFICANT CHANGE UP (ref 42.2–75.2)
NRBC # BLD: 0 /100 WBCS — SIGNIFICANT CHANGE UP (ref 0–0)
PHOSPHATE SERPL-MCNC: 2.6 MG/DL — SIGNIFICANT CHANGE UP (ref 2.1–4.9)
PLATELET # BLD AUTO: 193 K/UL — SIGNIFICANT CHANGE UP (ref 130–400)
POTASSIUM SERPL-MCNC: 3.9 MMOL/L — SIGNIFICANT CHANGE UP (ref 3.5–5)
POTASSIUM SERPL-SCNC: 3.9 MMOL/L — SIGNIFICANT CHANGE UP (ref 3.5–5)
PROTHROM AB SERPL-ACNC: 11.5 SEC — SIGNIFICANT CHANGE UP (ref 9.95–12.87)
RBC # BLD: 3.29 M/UL — LOW (ref 4.2–5.4)
RBC # FLD: 16.7 % — HIGH (ref 11.5–14.5)
SODIUM SERPL-SCNC: 137 MMOL/L — SIGNIFICANT CHANGE UP (ref 135–146)
SPECIMEN SOURCE: SIGNIFICANT CHANGE UP
TYPE + AB SCN PNL BLD: SIGNIFICANT CHANGE UP
WBC # BLD: 7.66 K/UL — SIGNIFICANT CHANGE UP (ref 4.8–10.8)
WBC # FLD AUTO: 7.66 K/UL — SIGNIFICANT CHANGE UP (ref 4.8–10.8)

## 2019-01-16 RX ORDER — SODIUM CHLORIDE 9 MG/ML
1000 INJECTION, SOLUTION INTRAVENOUS
Qty: 0 | Refills: 0 | Status: DISCONTINUED | OUTPATIENT
Start: 2019-01-16 | End: 2019-01-17

## 2019-01-16 RX ADMIN — Medication 2.5 MILLIGRAM(S): at 23:26

## 2019-01-16 RX ADMIN — SODIUM CHLORIDE 50 MILLILITER(S): 9 INJECTION, SOLUTION INTRAVENOUS at 08:31

## 2019-01-16 RX ADMIN — SODIUM CHLORIDE 100 MILLILITER(S): 9 INJECTION, SOLUTION INTRAVENOUS at 08:18

## 2019-01-16 RX ADMIN — PANTOPRAZOLE SODIUM 40 MILLIGRAM(S): 20 TABLET, DELAYED RELEASE ORAL at 13:19

## 2019-01-16 RX ADMIN — MORPHINE SULFATE 1 MILLIGRAM(S): 50 CAPSULE, EXTENDED RELEASE ORAL at 01:05

## 2019-01-16 RX ADMIN — Medication 0.62 MILLIGRAM(S): at 01:05

## 2019-01-16 RX ADMIN — MORPHINE SULFATE 1 MILLIGRAM(S): 50 CAPSULE, EXTENDED RELEASE ORAL at 23:12

## 2019-01-16 RX ADMIN — MORPHINE SULFATE 1 MILLIGRAM(S): 50 CAPSULE, EXTENDED RELEASE ORAL at 13:19

## 2019-01-16 RX ADMIN — Medication 0.62 MILLIGRAM(S): at 06:12

## 2019-01-16 RX ADMIN — MORPHINE SULFATE 1 MILLIGRAM(S): 50 CAPSULE, EXTENDED RELEASE ORAL at 23:45

## 2019-01-16 RX ADMIN — SODIUM CHLORIDE 100 MILLILITER(S): 9 INJECTION, SOLUTION INTRAVENOUS at 05:13

## 2019-01-16 RX ADMIN — Medication 2.5 MILLIGRAM(S): at 06:13

## 2019-01-16 RX ADMIN — MORPHINE SULFATE 1 MILLIGRAM(S): 50 CAPSULE, EXTENDED RELEASE ORAL at 13:39

## 2019-01-16 RX ADMIN — Medication 0.62 MILLIGRAM(S): at 13:18

## 2019-01-16 NOTE — CONSULT NOTE ADULT - ASSESSMENT
72 yo F with hx of ovarian cancer,last chemo in 2017,in remission since then.  chronic anemia,on procrit weekly  admitted with abdo pain ,found to have   SBO and t-point in large chronically incarcerated incisional hernia    Plan  keep NPO as per Sx  on iv hydration 100 LR/h  monitor electrolytes  Serial exams  start on procrit 40,000 unit qweekly,due today  cont other supportive care.  will need MRCP to evaluate pancreatic lesion,can be done as outpt when acute issue is resolved.  cont other supportive care as per Sx.  will f/u

## 2019-01-16 NOTE — PATIENT PROFILE ADULT - NSPROMUTANXFEARADDRESSFT_GEN_A_NUR
Spoke with her about the process of surgery and explained to her that the dr and lucrecia will discuss the whole process.

## 2019-01-16 NOTE — CONSULT NOTE ADULT - SUBJECTIVE AND OBJECTIVE BOX
Patient is a 73y old  Female who presents with a chief complaint of Abdominal pain (16 Jan 2019 01:02)      HPI:  HPI:   73y Female PMH of DMT2, CHF, Ovarian CA s/p BARON and BSO 2y ago, gastric CA s/p chemo follows at [facility] Oncologist Dr. Pratt, has chronically incarcerated ventral hernia containing loops of small bowel, presents with abdominal pain that has progressively worsened over 1 week with associated nausea, decreased appetite, and infrequent, small BM, no flatus. Patient had CT A/P demonstrating loops of dilated small bowel concerning for SBO, TP is seen in hernia neck, with edema in hernia sac. LA 3.2. No fevers, HD Stable. Patient poor historian, most of F/U at [facility], unsure of names of care providers. Not sure when last C-scope/endoscopy was.   Transferred to [place] for evaluation.    PAST MEDICAL & SURGICAL HISTORY:  Bronchitis  Diabetes mellitus, type 2 (15 Cortes 2019 21:17)       ROS:  Negative except for:abdominal pain    PAST MEDICAL & SURGICAL HISTORY:  Bronchitis  Diabetes mellitus, type 2  gastric cancer,ovarian cancer      SOCIAL HISTORY:    FAMILY HISTORY:      MEDICATIONS  (STANDING):  chlorhexidine 4% Liquid 1 Application(s) Topical <User Schedule>  dextrose 5% + sodium chloride 0.45%. 1000 milliLiter(s) (100 mL/Hr) IV Continuous <Continuous>  dextrose 5%. 1000 milliLiter(s) (50 mL/Hr) IV Continuous <Continuous>  dextrose 5%. 1000 milliLiter(s) (50 mL/Hr) IV Continuous <Continuous>  dextrose 50% Injectable 12.5 Gram(s) IV Push once  dextrose 50% Injectable 25 Gram(s) IV Push once  dextrose 50% Injectable 25 Gram(s) IV Push once  enalaprilat Injectable 0.625 milliGRAM(s) IV Push every 6 hours  heparin  Injectable 5000 Unit(s) SubCutaneous every 8 hours  insulin lispro (HumaLOG) corrective regimen sliding scale   SubCutaneous three times a day before meals  lactated ringers. 1000 milliLiter(s) (100 mL/Hr) IV Continuous <Continuous>  metoprolol tartrate Injectable 2.5 milliGRAM(s) IV Push every 8 hours  morphine  - Injectable 1 milliGRAM(s) IV Push every 6 hours  ondansetron Injectable 4 milliGRAM(s) IV Push every 8 hours  pantoprazole  Injectable 40 milliGRAM(s) IV Push daily    MEDICATIONS  (PRN):  dextrose 40% Gel 15 Gram(s) Oral once PRN Blood Glucose LESS THAN 70 milliGRAM(s)/deciliter  glucagon  Injectable 1 milliGRAM(s) IntraMuscular once PRN Glucose LESS THAN 70 milligrams/deciliter      Allergies    ciprofloxacin (Hives)  penicillin (Rash)    Intolerances        Vital Signs Last 24 Hrs  T(C): 36.1 (16 Jan 2019 13:06), Max: 36.1 (16 Jan 2019 00:05)  T(F): 97 (16 Jan 2019 13:06), Max: 97 (16 Jan 2019 13:06)  HR: 98 (16 Jan 2019 13:06) (81 - 101)  BP: 121/62 (16 Jan 2019 13:06) (102/51 - 121/62)  BP(mean): --  RR: 18 (16 Jan 2019 13:06) (18 - 20)  SpO2: 98% (16 Jan 2019 13:06) (95% - 100%)    PHYSICAL EXAM  General: adult in NAD  HEENT: clear oropharynx, anicteric sclera, pink conjunctiva  Neck: supple  CV: normal S1/S2 with no murmur rubs or gallops  Lungs: positive air movement b/l ant lungs,clear to auscultation, no wheezes, no rales  Abdomen: soft non-tender non-distended, no hepatosplenomegaly,+hernia  Ext: no clubbing cyanosis or edema  Skin: no rashes and no petechiae  Neuro: alert and oriented X 4, no focal deficits      LABS:                          8.4    7.66  )-----------( 193      ( 15 Cortes 2019 23:54 )             28.1         Mean Cell Volume : 85.4 fL  Mean Cell Hemoglobin : 25.5 pg  Mean Cell Hemoglobin Concentration : 29.9 g/dL  Auto Neutrophil # : 3.96 K/uL  Auto Lymphocyte # : 2.83 K/uL  Auto Monocyte # : 0.71 K/uL  Auto Eosinophil # : 0.08 K/uL  Auto Basophil # : 0.04 K/uL  Auto Neutrophil % : 51.8 %  Auto Lymphocyte % : 36.9 %  Auto Monocyte % : 9.3 %  Auto Eosinophil % : 1.0 %  Auto Basophil % : 0.5 %      Serial CBC's  01-15 @ 23:54  Hct-28.1 / Hgb-8.4 / Plat-193 / RBC-3.29 / WBC-7.66  Serial CBC's  01-15 @ 10:30  Hct-31.7 / Hgb-9.2 / Plat-252 / RBC-3.69 / WBC-10.39      01-15    137  |  96<L>  |  18  ----------------------------<  79  3.9   |  28  |  0.9    Ca    9.4      15 Cortes 2019 23:54  Phos  2.6     01-15  Mg     1.9     01-15    TPro  7.0  /  Alb  4.4  /  TBili  <0.2  /  DBili  x   /  AST  20  /  ALT  18  /  AlkPhos  74  01-15      PT/INR - ( 15 Cortes 2019 23:54 )   PT: 11.50 sec;   INR: 1.00 ratio         PTT - ( 15 Cortes 2019 23:54 )  PTT:30.5 sec                BLOOD SMEAR INTERPRETATION:       RADIOLOGY & ADDITIONAL STUDIES:

## 2019-01-16 NOTE — PRE-ANESTHESIA EVALUATION ADULT - NSANTHPMHFT_GEN_ALL_CORE
CAD with fixed defect but improved symptoms with cardiac rehab  CHF on home diuretic therapy , last echo EF 35%  IDDM

## 2019-01-16 NOTE — PROGRESS NOTE ADULT - SUBJECTIVE AND OBJECTIVE BOX
Progress Note: General Surgery  Patient: JILLIAN SEE , 73y (1945)Female   MRN: [id number]  Location: [address]  Visit: 01-15-19 Inpatient  Date: 19 @ 01:02    Procedure/Diagnosis: SBO, chronic incarcerated hernia      Vitals: T(F): 96.9 (19 @ 00:05), Max: 97.7 (01-15-19 @ 16:23)  HR: 90 (19 @ 00:05)  BP: 102/51 (19 @ 00:05) (95/55 - 140/62)  RR: 18 (19 @ 00:05)  SpO2: 98% (19 @ 00:05)      Diet:   IV Fluids: dextrose 5% + sodium chloride 0.45%. 1000 milliLiter(s) (100 mL/Hr) IV Continuous <Continuous>  dextrose 5%. 1000 milliLiter(s) (50 mL/Hr) IV Continuous <Continuous>  lactated ringers. 1000 milliLiter(s) (100 mL/Hr) IV Continuous <Continuous>      Physical Examination:  General Appearance: NAD  HEENT: EOMI, sclera non-icteric.  Heart: RRR   Lungs: CTABL.   Abdomen:  Large ventral hernia. Soft, nontender, nondistended.   MSK/Extremities: Warm & well-perfused. Peripheral pulses intact.  Skin: Warm, dry. No jaundice.       Medications: [Standing]  chlorhexidine 4% Liquid 1 Application(s) Topical <User Schedule>  dextrose 5% + sodium chloride 0.45%. 1000 milliLiter(s) (100 mL/Hr) IV Continuous <Continuous>  dextrose 5%. 1000 milliLiter(s) (50 mL/Hr) IV Continuous <Continuous>  dextrose 50% Injectable 12.5 Gram(s) IV Push once  dextrose 50% Injectable 25 Gram(s) IV Push once  dextrose 50% Injectable 25 Gram(s) IV Push once  enalaprilat Injectable 0.625 milliGRAM(s) IV Push every 6 hours  heparin  Injectable 5000 Unit(s) SubCutaneous every 8 hours  insulin lispro (HumaLOG) corrective regimen sliding scale   SubCutaneous three times a day before meals  lactated ringers. 1000 milliLiter(s) (100 mL/Hr) IV Continuous <Continuous>  metoprolol tartrate Injectable 2.5 milliGRAM(s) IV Push every 8 hours  morphine  - Injectable 1 milliGRAM(s) IV Push every 6 hours  ondansetron Injectable 4 milliGRAM(s) IV Push every 8 hours  pantoprazole  Injectable 40 milliGRAM(s) IV Push daily    DVT Prophylaxis: heparin  Injectable 5000 Unit(s) SubCutaneous every 8 hours    GI Prophylaxis: pantoprazole  Injectable 40 milliGRAM(s) IV Push daily    Antibiotics:   Anticoagulation:   Medications:[PRN]  dextrose 40% Gel 15 Gram(s) Oral once PRN  glucagon  Injectable 1 milliGRAM(s) IntraMuscular once PRN      Labs:                        8.4    7.66  )-----------( 193      ( 15 Cortes 2019 23:54 )             28.1     -15    137  |  96<L>  |  18  ----------------------------<  79  3.9   |  28  |  0.9    Ca    9.4      15 Cortes 2019 23:54  Phos  2.6     01-15  Mg     1.9     01-15    TPro  7.0  /  Alb  4.4  /  TBili  <0.2  /  DBili  x   /  AST  20  /  ALT  18  /  AlkPhos  74  01-15    LIVER FUNCTIONS - ( 15 Cortes 2019 10:30 )  Alb: 4.4 g/dL / Pro: 7.0 g/dL / ALK PHOS: 74 U/L / ALT: 18 U/L / AST: 20 U/L / GGT: x           PT/INR - ( 15 Cortes 2019 23:54 )   PT: 11.50 sec;   INR: 1.00 ratio         PTT - ( 15 Cortes 2019 23:54 )  PTT:30.5 sec    CARDIAC MARKERS ( 15 Cortes 2019 10:30 )  x     / <0.01 ng/mL / x     / x     / x          Urine/Micro:    Urinalysis Basic - ( 15 Cortes 2019 10:30 )    Color: Yellow / Appearance: Clear / S.020 / pH: x  Gluc: x / Ketone: Negative  / Bili: Negative / Urobili: 0.2 mg/dL   Blood: x / Protein: 30 mg/dL / Nitrite: Negative   Leuk Esterase: Negative / RBC: x / WBC 3-5 /HPF   Sq Epi: x / Non Sq Epi: Moderate /HPF / Bacteria: x        Imaging:     < from: CT Abdomen and Pelvis w/ IV Cont (01.15.19 @ 14:10) >  Multiple dilated loops of small bowel, leading up to a transition point   located within a large ventral wall abdominal hernia. Findings are   consistent with small bowel obstruction.    There is a 6 mm hypodensity at the junction of the body and tail the   pancreas, incompletely evaluated. This may represent a side branch I PMN.   However, further evaluation with pancreatic MRI with and without contrast   and MRCP imaging is recommended    < end of copied text >      Assessment:  73y Female patient admitted SBO, chronic incarcerated hernia    Plan:    NPO  IVF  NGT to low wall suction  Ureña  Preop today for possible OR Thurs  DVT/GI ppx  OOBAT  IS  Pain control    Date/Time: 19 @ 01:02

## 2019-01-17 LAB
ALBUMIN SERPL ELPH-MCNC: 3.3 G/DL — LOW (ref 3.5–5.2)
ALP SERPL-CCNC: 70 U/L — SIGNIFICANT CHANGE UP (ref 30–115)
ALT FLD-CCNC: 14 U/L — SIGNIFICANT CHANGE UP (ref 0–41)
ANION GAP SERPL CALC-SCNC: 16 MMOL/L — HIGH (ref 7–14)
ANION GAP SERPL CALC-SCNC: 16 MMOL/L — HIGH (ref 7–14)
APTT BLD: 28.8 SEC — SIGNIFICANT CHANGE UP (ref 27–39.2)
AST SERPL-CCNC: 19 U/L — SIGNIFICANT CHANGE UP (ref 0–41)
BASOPHILS # BLD AUTO: 0.02 K/UL — SIGNIFICANT CHANGE UP (ref 0–0.2)
BASOPHILS # BLD AUTO: 0.04 K/UL — SIGNIFICANT CHANGE UP (ref 0–0.2)
BASOPHILS NFR BLD AUTO: 0.2 % — SIGNIFICANT CHANGE UP (ref 0–1)
BASOPHILS NFR BLD AUTO: 0.3 % — SIGNIFICANT CHANGE UP (ref 0–1)
BILIRUB DIRECT SERPL-MCNC: 0.4 MG/DL — HIGH (ref 0–0.2)
BILIRUB INDIRECT FLD-MCNC: 0.3 MG/DL — SIGNIFICANT CHANGE UP (ref 0.2–1.2)
BILIRUB SERPL-MCNC: 0.7 MG/DL — SIGNIFICANT CHANGE UP (ref 0.2–1.2)
BUN SERPL-MCNC: 15 MG/DL — SIGNIFICANT CHANGE UP (ref 10–20)
BUN SERPL-MCNC: 17 MG/DL — SIGNIFICANT CHANGE UP (ref 10–20)
CALCIUM SERPL-MCNC: 8.6 MG/DL — SIGNIFICANT CHANGE UP (ref 8.5–10.1)
CALCIUM SERPL-MCNC: 9.1 MG/DL — SIGNIFICANT CHANGE UP (ref 8.5–10.1)
CHLORIDE SERPL-SCNC: 93 MMOL/L — LOW (ref 98–110)
CHLORIDE SERPL-SCNC: 96 MMOL/L — LOW (ref 98–110)
CK MB CFR SERPL CALC: 1.5 NG/ML — SIGNIFICANT CHANGE UP (ref 0.6–6.3)
CK SERPL-CCNC: 47 U/L — SIGNIFICANT CHANGE UP (ref 0–225)
CO2 SERPL-SCNC: 24 MMOL/L — SIGNIFICANT CHANGE UP (ref 17–32)
CO2 SERPL-SCNC: 26 MMOL/L — SIGNIFICANT CHANGE UP (ref 17–32)
CREAT SERPL-MCNC: 1 MG/DL — SIGNIFICANT CHANGE UP (ref 0.7–1.5)
CREAT SERPL-MCNC: 1 MG/DL — SIGNIFICANT CHANGE UP (ref 0.7–1.5)
EOSINOPHIL # BLD AUTO: 0.07 K/UL — SIGNIFICANT CHANGE UP (ref 0–0.7)
EOSINOPHIL # BLD AUTO: 0.09 K/UL — SIGNIFICANT CHANGE UP (ref 0–0.7)
EOSINOPHIL NFR BLD AUTO: 0.5 % — SIGNIFICANT CHANGE UP (ref 0–8)
EOSINOPHIL NFR BLD AUTO: 1 % — SIGNIFICANT CHANGE UP (ref 0–8)
GLUCOSE BLDC GLUCOMTR-MCNC: 201 MG/DL — HIGH (ref 70–99)
GLUCOSE BLDC GLUCOMTR-MCNC: 233 MG/DL — HIGH (ref 70–99)
GLUCOSE BLDC GLUCOMTR-MCNC: 245 MG/DL — HIGH (ref 70–99)
GLUCOSE SERPL-MCNC: 228 MG/DL — HIGH (ref 70–99)
GLUCOSE SERPL-MCNC: 82 MG/DL — SIGNIFICANT CHANGE UP (ref 70–99)
HCT VFR BLD CALC: 28.1 % — LOW (ref 37–47)
HCT VFR BLD CALC: 28.3 % — LOW (ref 37–47)
HGB BLD-MCNC: 8.1 G/DL — LOW (ref 12–16)
HGB BLD-MCNC: 8.1 G/DL — LOW (ref 12–16)
IMM GRANULOCYTES NFR BLD AUTO: 0.3 % — SIGNIFICANT CHANGE UP (ref 0.1–0.3)
IMM GRANULOCYTES NFR BLD AUTO: 0.5 % — HIGH (ref 0.1–0.3)
INR BLD: 1.12 RATIO — SIGNIFICANT CHANGE UP (ref 0.65–1.3)
LACTATE SERPL-SCNC: 1.1 MMOL/L — SIGNIFICANT CHANGE UP (ref 0.5–2.2)
LYMPHOCYTES # BLD AUTO: 2.73 K/UL — SIGNIFICANT CHANGE UP (ref 1.2–3.4)
LYMPHOCYTES # BLD AUTO: 30.5 % — SIGNIFICANT CHANGE UP (ref 20.5–51.1)
LYMPHOCYTES # BLD AUTO: 38.6 % — SIGNIFICANT CHANGE UP (ref 20.5–51.1)
LYMPHOCYTES # BLD AUTO: 5.01 K/UL — HIGH (ref 1.2–3.4)
MAGNESIUM SERPL-MCNC: 1.7 MG/DL — LOW (ref 1.8–2.4)
MAGNESIUM SERPL-MCNC: 2 MG/DL — SIGNIFICANT CHANGE UP (ref 1.8–2.4)
MCHC RBC-ENTMCNC: 25 PG — LOW (ref 27–31)
MCHC RBC-ENTMCNC: 25.2 PG — LOW (ref 27–31)
MCHC RBC-ENTMCNC: 28.6 G/DL — LOW (ref 32–37)
MCHC RBC-ENTMCNC: 28.8 G/DL — LOW (ref 32–37)
MCV RBC AUTO: 87.3 FL — SIGNIFICANT CHANGE UP (ref 81–99)
MCV RBC AUTO: 87.3 FL — SIGNIFICANT CHANGE UP (ref 81–99)
MONOCYTES # BLD AUTO: 0.87 K/UL — HIGH (ref 0.1–0.6)
MONOCYTES # BLD AUTO: 0.92 K/UL — HIGH (ref 0.1–0.6)
MONOCYTES NFR BLD AUTO: 7.1 % — SIGNIFICANT CHANGE UP (ref 1.7–9.3)
MONOCYTES NFR BLD AUTO: 9.7 % — HIGH (ref 1.7–9.3)
NEUTROPHILS # BLD AUTO: 5.22 K/UL — SIGNIFICANT CHANGE UP (ref 1.4–6.5)
NEUTROPHILS # BLD AUTO: 6.89 K/UL — HIGH (ref 1.4–6.5)
NEUTROPHILS NFR BLD AUTO: 53 % — SIGNIFICANT CHANGE UP (ref 42.2–75.2)
NEUTROPHILS NFR BLD AUTO: 58.3 % — SIGNIFICANT CHANGE UP (ref 42.2–75.2)
NRBC # BLD: 0 /100 WBCS — SIGNIFICANT CHANGE UP (ref 0–0)
PHOSPHATE SERPL-MCNC: 3.5 MG/DL — SIGNIFICANT CHANGE UP (ref 2.1–4.9)
PHOSPHATE SERPL-MCNC: 4.2 MG/DL — SIGNIFICANT CHANGE UP (ref 2.1–4.9)
PLATELET # BLD AUTO: 185 K/UL — SIGNIFICANT CHANGE UP (ref 130–400)
PLATELET # BLD AUTO: 232 K/UL — SIGNIFICANT CHANGE UP (ref 130–400)
POTASSIUM SERPL-MCNC: 4.4 MMOL/L — SIGNIFICANT CHANGE UP (ref 3.5–5)
POTASSIUM SERPL-MCNC: 4.6 MMOL/L — SIGNIFICANT CHANGE UP (ref 3.5–5)
POTASSIUM SERPL-SCNC: 4.4 MMOL/L — SIGNIFICANT CHANGE UP (ref 3.5–5)
POTASSIUM SERPL-SCNC: 4.6 MMOL/L — SIGNIFICANT CHANGE UP (ref 3.5–5)
PROT SERPL-MCNC: 5.4 G/DL — LOW (ref 6–8)
PROTHROM AB SERPL-ACNC: 12.9 SEC — HIGH (ref 9.95–12.87)
RBC # BLD: 3.22 M/UL — LOW (ref 4.2–5.4)
RBC # BLD: 3.24 M/UL — LOW (ref 4.2–5.4)
RBC # FLD: 17.1 % — HIGH (ref 11.5–14.5)
RBC # FLD: 17.1 % — HIGH (ref 11.5–14.5)
SODIUM SERPL-SCNC: 133 MMOL/L — LOW (ref 135–146)
SODIUM SERPL-SCNC: 138 MMOL/L — SIGNIFICANT CHANGE UP (ref 135–146)
TROPONIN T SERPL-MCNC: <0.01 NG/ML — SIGNIFICANT CHANGE UP
WBC # BLD: 12.99 K/UL — HIGH (ref 4.8–10.8)
WBC # BLD: 8.96 K/UL — SIGNIFICANT CHANGE UP (ref 4.8–10.8)
WBC # FLD AUTO: 12.99 K/UL — HIGH (ref 4.8–10.8)
WBC # FLD AUTO: 8.96 K/UL — SIGNIFICANT CHANGE UP (ref 4.8–10.8)

## 2019-01-17 PROCEDURE — 49561: CPT | Mod: 22

## 2019-01-17 PROCEDURE — 99291 CRITICAL CARE FIRST HOUR: CPT | Mod: 24

## 2019-01-17 PROCEDURE — 49568: CPT

## 2019-01-17 RX ORDER — ONDANSETRON 8 MG/1
4 TABLET, FILM COATED ORAL ONCE
Qty: 0 | Refills: 0 | Status: COMPLETED | OUTPATIENT
Start: 2019-01-17 | End: 2019-01-17

## 2019-01-17 RX ORDER — CHLORHEXIDINE GLUCONATE 213 G/1000ML
1 SOLUTION TOPICAL
Qty: 0 | Refills: 0 | Status: DISCONTINUED | OUTPATIENT
Start: 2019-01-17 | End: 2019-01-23

## 2019-01-17 RX ORDER — SODIUM CHLORIDE 9 MG/ML
1000 INJECTION INTRAMUSCULAR; INTRAVENOUS; SUBCUTANEOUS
Qty: 0 | Refills: 0 | Status: DISCONTINUED | OUTPATIENT
Start: 2019-01-17 | End: 2019-01-19

## 2019-01-17 RX ORDER — INSULIN HUMAN 100 [IU]/ML
INJECTION, SOLUTION SUBCUTANEOUS EVERY 4 HOURS
Qty: 0 | Refills: 0 | Status: DISCONTINUED | OUTPATIENT
Start: 2019-01-17 | End: 2019-01-18

## 2019-01-17 RX ORDER — SODIUM CHLORIDE 9 MG/ML
1000 INJECTION, SOLUTION INTRAVENOUS
Qty: 0 | Refills: 0 | Status: DISCONTINUED | OUTPATIENT
Start: 2019-01-17 | End: 2019-01-17

## 2019-01-17 RX ORDER — SODIUM CHLORIDE 9 MG/ML
1000 INJECTION, SOLUTION INTRAVENOUS
Qty: 0 | Refills: 0 | Status: DISCONTINUED | OUTPATIENT
Start: 2019-01-17 | End: 2019-01-20

## 2019-01-17 RX ORDER — SODIUM CHLORIDE 9 MG/ML
250 INJECTION INTRAMUSCULAR; INTRAVENOUS; SUBCUTANEOUS ONCE
Qty: 0 | Refills: 0 | Status: DISCONTINUED | OUTPATIENT
Start: 2019-01-17 | End: 2019-01-17

## 2019-01-17 RX ORDER — HYDROMORPHONE HYDROCHLORIDE 2 MG/ML
0.5 INJECTION INTRAMUSCULAR; INTRAVENOUS; SUBCUTANEOUS
Qty: 0 | Refills: 0 | Status: DISCONTINUED | OUTPATIENT
Start: 2019-01-17 | End: 2019-01-18

## 2019-01-17 RX ORDER — DEXTROSE 50 % IN WATER 50 %
25 SYRINGE (ML) INTRAVENOUS ONCE
Qty: 0 | Refills: 0 | Status: DISCONTINUED | OUTPATIENT
Start: 2019-01-17 | End: 2019-01-20

## 2019-01-17 RX ORDER — MORPHINE SULFATE 50 MG/1
1 CAPSULE, EXTENDED RELEASE ORAL EVERY 6 HOURS
Qty: 0 | Refills: 0 | Status: DISCONTINUED | OUTPATIENT
Start: 2019-01-17 | End: 2019-01-18

## 2019-01-17 RX ORDER — SODIUM CHLORIDE 9 MG/ML
250 INJECTION INTRAMUSCULAR; INTRAVENOUS; SUBCUTANEOUS ONCE
Qty: 0 | Refills: 0 | Status: COMPLETED | OUTPATIENT
Start: 2019-01-17 | End: 2019-01-17

## 2019-01-17 RX ORDER — METOPROLOL TARTRATE 50 MG
2.5 TABLET ORAL EVERY 8 HOURS
Qty: 0 | Refills: 0 | Status: DISCONTINUED | OUTPATIENT
Start: 2019-01-17 | End: 2019-01-17

## 2019-01-17 RX ORDER — HEPARIN SODIUM 5000 [USP'U]/ML
5000 INJECTION INTRAVENOUS; SUBCUTANEOUS EVERY 8 HOURS
Qty: 0 | Refills: 0 | Status: DISCONTINUED | OUTPATIENT
Start: 2019-01-17 | End: 2019-01-23

## 2019-01-17 RX ORDER — HYDROMORPHONE HYDROCHLORIDE 2 MG/ML
1 INJECTION INTRAMUSCULAR; INTRAVENOUS; SUBCUTANEOUS
Qty: 0 | Refills: 0 | Status: DISCONTINUED | OUTPATIENT
Start: 2019-01-17 | End: 2019-01-18

## 2019-01-17 RX ORDER — PROCHLORPERAZINE MALEATE 5 MG
5 TABLET ORAL ONCE
Qty: 0 | Refills: 0 | Status: COMPLETED | OUTPATIENT
Start: 2019-01-17 | End: 2019-01-17

## 2019-01-17 RX ORDER — DEXTROSE 50 % IN WATER 50 %
15 SYRINGE (ML) INTRAVENOUS ONCE
Qty: 0 | Refills: 0 | Status: DISCONTINUED | OUTPATIENT
Start: 2019-01-17 | End: 2019-01-17

## 2019-01-17 RX ORDER — GLUCAGON INJECTION, SOLUTION 0.5 MG/.1ML
1 INJECTION, SOLUTION SUBCUTANEOUS ONCE
Qty: 0 | Refills: 0 | Status: DISCONTINUED | OUTPATIENT
Start: 2019-01-17 | End: 2019-01-17

## 2019-01-17 RX ORDER — BENZOCAINE 10 %
1 GEL (GRAM) MUCOUS MEMBRANE ONCE
Qty: 0 | Refills: 0 | Status: COMPLETED | OUTPATIENT
Start: 2019-01-17 | End: 2019-01-17

## 2019-01-17 RX ORDER — SODIUM CHLORIDE 9 MG/ML
250 INJECTION, SOLUTION INTRAVENOUS ONCE
Qty: 0 | Refills: 0 | Status: COMPLETED | OUTPATIENT
Start: 2019-01-17 | End: 2019-01-17

## 2019-01-17 RX ORDER — INSULIN LISPRO 100/ML
VIAL (ML) SUBCUTANEOUS
Qty: 0 | Refills: 0 | Status: DISCONTINUED | OUTPATIENT
Start: 2019-01-17 | End: 2019-01-17

## 2019-01-17 RX ORDER — MAGNESIUM SULFATE 500 MG/ML
2 VIAL (ML) INJECTION ONCE
Qty: 0 | Refills: 0 | Status: COMPLETED | OUTPATIENT
Start: 2019-01-17 | End: 2019-01-17

## 2019-01-17 RX ORDER — ONDANSETRON 8 MG/1
4 TABLET, FILM COATED ORAL EVERY 8 HOURS
Qty: 0 | Refills: 0 | Status: DISCONTINUED | OUTPATIENT
Start: 2019-01-17 | End: 2019-01-19

## 2019-01-17 RX ORDER — DEXTROSE 50 % IN WATER 50 %
12.5 SYRINGE (ML) INTRAVENOUS ONCE
Qty: 0 | Refills: 0 | Status: DISCONTINUED | OUTPATIENT
Start: 2019-01-17 | End: 2019-01-17

## 2019-01-17 RX ORDER — INSULIN HUMAN 100 [IU]/ML
INJECTION, SOLUTION SUBCUTANEOUS EVERY 4 HOURS
Qty: 0 | Refills: 0 | Status: DISCONTINUED | OUTPATIENT
Start: 2019-01-17 | End: 2019-01-17

## 2019-01-17 RX ORDER — PANTOPRAZOLE SODIUM 20 MG/1
40 TABLET, DELAYED RELEASE ORAL DAILY
Qty: 0 | Refills: 0 | Status: DISCONTINUED | OUTPATIENT
Start: 2019-01-17 | End: 2019-01-19

## 2019-01-17 RX ADMIN — ONDANSETRON 4 MILLIGRAM(S): 8 TABLET, FILM COATED ORAL at 22:35

## 2019-01-17 RX ADMIN — HEPARIN SODIUM 5000 UNIT(S): 5000 INJECTION INTRAVENOUS; SUBCUTANEOUS at 22:25

## 2019-01-17 RX ADMIN — SODIUM CHLORIDE 100 MILLILITER(S): 9 INJECTION, SOLUTION INTRAVENOUS at 05:20

## 2019-01-17 RX ADMIN — SODIUM CHLORIDE 500 MILLILITER(S): 9 INJECTION, SOLUTION INTRAVENOUS at 18:30

## 2019-01-17 RX ADMIN — MORPHINE SULFATE 1 MILLIGRAM(S): 50 CAPSULE, EXTENDED RELEASE ORAL at 05:18

## 2019-01-17 RX ADMIN — Medication 100 MILLIGRAM(S): at 22:40

## 2019-01-17 RX ADMIN — Medication 5 MILLIGRAM(S): at 20:00

## 2019-01-17 RX ADMIN — SODIUM CHLORIDE 250 MILLILITER(S): 9 INJECTION INTRAMUSCULAR; INTRAVENOUS; SUBCUTANEOUS at 19:30

## 2019-01-17 RX ADMIN — MORPHINE SULFATE 1 MILLIGRAM(S): 50 CAPSULE, EXTENDED RELEASE ORAL at 05:30

## 2019-01-17 RX ADMIN — SODIUM CHLORIDE 100 MILLILITER(S): 9 INJECTION, SOLUTION INTRAVENOUS at 17:18

## 2019-01-17 RX ADMIN — Medication 4: at 08:38

## 2019-01-17 RX ADMIN — SODIUM CHLORIDE 100 MILLILITER(S): 9 INJECTION INTRAMUSCULAR; INTRAVENOUS; SUBCUTANEOUS at 23:31

## 2019-01-17 RX ADMIN — Medication 50 GRAM(S): at 22:41

## 2019-01-17 RX ADMIN — ONDANSETRON 4 MILLIGRAM(S): 8 TABLET, FILM COATED ORAL at 18:00

## 2019-01-17 RX ADMIN — Medication 1 SPRAY(S): at 20:02

## 2019-01-17 RX ADMIN — CHLORHEXIDINE GLUCONATE 1 APPLICATION(S): 213 SOLUTION TOPICAL at 05:17

## 2019-01-17 RX ADMIN — INSULIN HUMAN 4: 100 INJECTION, SOLUTION SUBCUTANEOUS at 20:05

## 2019-01-17 NOTE — PROGRESS NOTE ADULT - SUBJECTIVE AND OBJECTIVE BOX
Patient is a 73y old  Female who presents with a chief complaint of Abdominal pain (16 Jan 2019 16:49)       Pt is seen and examined  pt is awake and lying in bed  pt seems comfortable and denies any complaints at this time          ROS:  Negative except for:abdominal pain    MEDICATIONS  (STANDING):  chlorhexidine 4% Liquid 1 Application(s) Topical <User Schedule>  dextrose 5% + sodium chloride 0.45%. 1000 milliLiter(s) (100 mL/Hr) IV Continuous <Continuous>  dextrose 5%. 1000 milliLiter(s) (50 mL/Hr) IV Continuous <Continuous>  dextrose 5%. 1000 milliLiter(s) (50 mL/Hr) IV Continuous <Continuous>  dextrose 50% Injectable 12.5 Gram(s) IV Push once  dextrose 50% Injectable 25 Gram(s) IV Push once  dextrose 50% Injectable 25 Gram(s) IV Push once  enalaprilat Injectable 0.625 milliGRAM(s) IV Push every 6 hours  heparin  Injectable 5000 Unit(s) SubCutaneous every 8 hours  insulin lispro (HumaLOG) corrective regimen sliding scale   SubCutaneous three times a day before meals  metoprolol tartrate Injectable 2.5 milliGRAM(s) IV Push every 8 hours  morphine  - Injectable 1 milliGRAM(s) IV Push every 6 hours  ondansetron Injectable 4 milliGRAM(s) IV Push every 8 hours  pantoprazole  Injectable 40 milliGRAM(s) IV Push daily    MEDICATIONS  (PRN):  dextrose 40% Gel 15 Gram(s) Oral once PRN Blood Glucose LESS THAN 70 milliGRAM(s)/deciliter  glucagon  Injectable 1 milliGRAM(s) IntraMuscular once PRN Glucose LESS THAN 70 milligrams/deciliter      Allergies    ciprofloxacin (Hives)  penicillin (Rash)    Intolerances        Vital Signs Last 24 Hrs  T(C): 36.7 (17 Jan 2019 04:52), Max: 37.3 (16 Jan 2019 20:33)  T(F): 98 (17 Jan 2019 04:52), Max: 99.2 (17 Jan 2019 00:39)  HR: 91 (17 Jan 2019 04:52) (91 - 100)  BP: 101/56 (17 Jan 2019 04:52) (101/56 - 122/61)  BP(mean): --  RR: 18 (17 Jan 2019 04:52) (18 - 18)  SpO2: 98% (16 Jan 2019 13:06) (98% - 98%)    PHYSICAL EXAM  General: adult in NAD  HEENT: clear oropharynx, anicteric sclera, pink conjunctiva,NGT in place  Neck: supple  CV: normal S1/S2 with no murmur rubs or gallops  Lungs: positive air movement b/l ant lungs,clear to auscultation, no wheezes, no rales  Abdomen: soft non-tender non-distended, no hepatosplenomegaly,++hernia  Ext: no clubbing cyanosis or edema  Skin: no rashes and no petechiae  Neuro: alert and oriented X 4, no focal deficits  LABS:                          8.1    8.96  )-----------( 185      ( 16 Jan 2019 23:51 )             28.3         Mean Cell Volume : 87.3 fL  Mean Cell Hemoglobin : 25.0 pg  Mean Cell Hemoglobin Concentration : 28.6 g/dL  Auto Neutrophil # : 5.22 K/uL  Auto Lymphocyte # : 2.73 K/uL  Auto Monocyte # : 0.87 K/uL  Auto Eosinophil # : 0.09 K/uL  Auto Basophil # : 0.02 K/uL  Auto Neutrophil % : 58.3 %  Auto Lymphocyte % : 30.5 %  Auto Monocyte % : 9.7 %  Auto Eosinophil % : 1.0 %  Auto Basophil % : 0.2 %    Serial CBC's  01-16 @ 23:51  Hct-28.3 / Hgb-8.1 / Plat-185 / RBC-3.24 / WBC-8.96          Serial CBC's  01-15 @ 23:54  Hct-28.1 / Hgb-8.4 / Plat-193 / RBC-3.29 / WBC-7.66          Serial CBC's  01-15 @ 10:30  Hct-31.7 / Hgb-9.2 / Plat-252 / RBC-3.69 / WBC-10.39            01-16    138  |  96<L>  |  15  ----------------------------<  82  4.4   |  26  |  1.0    Ca    9.1      16 Jan 2019 23:51  Phos  3.5     01-16  Mg     2.0     01-16    TPro  7.0  /  Alb  4.4  /  TBili  <0.2  /  DBili  x   /  AST  20  /  ALT  18  /  AlkPhos  74  01-15      PT/INR - ( 15 Cortes 2019 23:54 )   PT: 11.50 sec;   INR: 1.00 ratio         PTT - ( 15 Cortes 2019 23:54 )  PTT:30.5 sec    Hematocrit: 28.3 % (01-16-19 @ 23:51)  WBC Count: 8.96 K/uL (01-16-19 @ 23:51)  Platelet Count - Automated: 185 K/uL (01-16-19 @ 23:51)  Hemoglobin: 8.1 g/dL (01-16-19 @ 23:51)  Hematocrit: 28.1 % (01-15-19 @ 23:54)  Platelet Count - Automated: 193 K/uL (01-15-19 @ 23:54)  Hemoglobin: 8.4 g/dL (01-15-19 @ 23:54)  WBC Count: 7.66 K/uL (01-15-19 @ 23:54)  WBC Count: 10.39 K/uL (01-15-19 @ 10:30)  Hematocrit: 31.7 % (01-15-19 @ 10:30)  Platelet Count - Automated: 252 K/uL (01-15-19 @ 10:30)  Hemoglobin: 9.2 g/dL (01-15-19 @ 10:30)                BLOOD SMEAR INTERPRETATION:       RADIOLOGY & ADDITIONAL STUDIES:

## 2019-01-17 NOTE — BRIEF OPERATIVE NOTE - PROCEDURE
<<-----Click on this checkbox to enter Procedure Repair of incarcerated ventral hernia with mesh  01/17/2019    Active  FELABBASY Enterolysis  01/17/2019    Active  APICON  Repair of incarcerated ventral hernia with mesh  01/17/2019    Active  Azam Chilel

## 2019-01-17 NOTE — CONSULT NOTE ADULT - ASSESSMENT
ASSESSMENT:  73y Female w/ CHF EF 35, DM, chronic anemia (Hg ~8-9, gets procrit), active gastric cancer on chemo at [facility] [address] [place], hx of ovarian ca/ s/p hysterectomy and oophorectomy 2 years ago presents for partial sbo 2/2 to  ventral hernia;  s/p repair w/ mesh and removal of adhesions    PLAN:   Neurologic: A+O, follows commands.  morphine q6, and diuladid prn   Respiratory: NC 2L sat at 96  Cardiovascular: Hx CHF (EF 35)- Home med: Benazepril 10mg and lasix;; Now on Vasotec 0.625mg q6 and metoprolol tartrate 2.6mg q8; post op 88/98 responded to fluid.   Gastrointestinal/Nutrition: NPO, NG tube, +PPI  Renal/Genitourinary: Ureña, voided; intra op IVF 1800, post op 250cc given.  Hematologic: chronic anemia baseline 8-9-- procrit 40,000 unit qweekly- last 1/16  Infectious Disease: Tmax 100.9 intraop  Lines/Tubes: 1/17 a. line,  2 peripheral IV  Endocrine: DM- sliding scale.   Disposition: ICU admission for monitoring 24 hours then re-access.     --------------------------------------------------------------------------------------    Critical Care Diagnoses:artial sbo 2/2 to incarcerated ventral hernia;  s/p repair w/ mesh and removal of adhesions ASSESSMENT:  73y Female w/ CHF EF 35, DM, chronic anemia (Hg ~8-9, gets procrit), active gastric cancer on chemo at [facility] [address] [place], hx of ovarian ca/ s/p hysterectomy and oophorectomy 2 years ago presents for partial sbo 2/2 to  ventral hernia;  s/p repair w/ mesh and removal of adhesions    PLAN:   Neurologic: A+O, follows commands.  morphine q6, and diuladid prn   Respiratory: NC 2L sat at 96  Cardiovascular: Hx CHF (EF 35)- Home med: Benazepril 10mg and lasix;; Now on Vasotec 0.625mg q6 and metoprolol tartrate 2.6mg q8; post op 88/48 responded to fluid.   Gastrointestinal/Nutrition: NPO, NG tube, +PPI  Renal/Genitourinary: Ureña, voided; intra op IVF 1800, post op 250cc given.  Hematologic: chronic anemia baseline 8-9-- procrit 40,000 unit qweekly- last 1/16  Infectious Disease: Tmax 100.9 intraop  Lines/Tubes: 1/17 a. line,  2 peripheral IV  Endocrine: DM- sliding scale.   Disposition: ICU admission for monitoring 24 hours then re-access.     --------------------------------------------------------------------------------------    Critical Care Diagnoses:artial sbo 2/2 to incarcerated ventral hernia;  s/p repair w/ mesh and removal of adhesions

## 2019-01-17 NOTE — CONSULT NOTE ADULT - CONSULT REASON
high risk pt w/ partial sbo 2/2 to  ventral hernia;  s/p repair w/ mesh and removal of adhesions-- pt tacchy 126s and tmax 102.7 pre-operative. high risk pt w/ partial sbo 2/2 to incarcerated ventral hernia;  s/p repair w/ mesh and removal of adhesions-- pt tacchy 126s and tmax 102.7 pre-operative.

## 2019-01-17 NOTE — CHART NOTE - NSCHARTNOTEFT_GEN_A_CORE
GENERAL SURGERY PROGRESS NOTE     JILLIAN SEE  73y  Female  Hospital day :2d  POD:  Procedure: Enterolysis  Repair of incarcerated ventral hernia with mesh    Pt is resting in PACU post procedure. Does complain of some nausea, but no vomiting. Placed on zofran post operative. Incision site is c/d/i. Large abd that is soft, and appropriately TTP post surgery. Pt vitals are stable. NGT has put out 50 dark brown fluid.     T(F): 98.2 (19 @ 16:42), Max: 99.2 (19 @ 00:39)  HR: 88 (19 @ 18:00) (86 - 109)  BP: 134/94 (19 @ 11:42) (101/56 - 134/94)  RR: 18 (19 @ 11:42) (18 - 18)  SpO2: 98% (19 @ 11:42) (98% - 98%)    DIET/FLUIDS: dextrose 5%. 1000 milliLiter(s) IV Continuous <Continuous>  dextrose 5%. 1000 milliLiter(s) IV Continuous <Continuous>  lactated ringers. 1000 milliLiter(s) IV Continuous <Continuous>  magnesium sulfate  IVPB 2 Gram(s) IV Intermittent once    N19 @ 07:  -  19 @ 07:00  --------------------------------------------------------  OUT: 200 mL    URINE:   19 @ 07:01  -  19 @ 07:00  --------------------------------------------------------  OUT: 850 mL     Indwelling Urethral Catheter:     Connect To:  Straight Drainage/Gravity    Indication:  Urine Output Monitoring in Critically Ill (19 @ 17:17)    GI proph:  pantoprazole  Injectable 40 milliGRAM(s) IV Push daily    AC/ proph: heparin  Injectable 5000 Unit(s) SubCutaneous every 8 hours    ABx: clindamycin IVPB 900 milliGRAM(s) IV Intermittent every 8 hours      PHYSICAL EXAM:  GENERAL: no acute distress, resting comfortably in bed.   RESP: on NC, no obvious increased wob  ABDOMEN: Large abd, soft, appropriately tender post procedure. Incision site c/d/i.         LABS      POCT Blood Glucose.: 245 mg/dL (2019 19:58)  POCT Blood Glucose.: 201 mg/dL (2019 12:15)  POCT Blood Glucose.: 233 mg/dL (2019 07:17)  POCT Blood Glucose.: 111 mg/dL (2019 22:17)                          8.1    12.99 )-----------( 232      ( 2019 18:30 )             28.1       Auto Neutrophil %: 53.0 % (19 @ 18:30)  Auto Immature Granulocyte %: 0.5 % (19 @ 18:30)  Auto Neutrophil %: 58.3 % (19 @ 23:51)  Auto Immature Granulocyte %: 0.3 % (19 @ 23:51)        133<L>  |  93<L>  |  17  ----------------------------<  228<H>  4.6   |  24  |  1.0      Calcium, Total Serum: 8.6 mg/dL (19 @ 18:30)      LFTs:             5.4  | 0.7  | 19       ------------------[70      ( 2019 18:30 )  3.3  | 0.4  | 14          Lipase:x      Amylase:x         Lactate, Blood: 1.1 mmol/L (19 @ 17:00)  Lactate, Blood: 2.0 mmol/L (01-15-19 @ 23:54)  Lactate, Blood: 3.2 mmol/L (01-15-19 @ 10:30)      Coags:     12.90  ----< 1.12    ( 2019 18:30 )     28.8        CARDIAC MARKERS ( 2019 18:30 )  x     / <0.01 ng/mL / 47 U/L / x     / 1.5 ng/mL              Culture - Urine (collected 15 Cortes 2019 10:30)  Source: .Urine Clean Catch (Midstream)  Final Report (2019 21:52):    No growth              A/P  Repair of incarcerated ventral hernia with mesh     PLAN:    - NGT in place, NPO/IVF    - pain control    - GI and DVT PPX

## 2019-01-17 NOTE — CONSULT NOTE ADULT - SUBJECTIVE AND OBJECTIVE BOX
SICU Consultation Note  =====================================================  HPI: 73y Female  HPI:   73y Female PMH of DMT2, CHF (EF 35), Ovarian CA s/p BARON and BSO 2y ago, gastric CA s/p chemo follows at [facility] Oncologist Dr. Pratt, has chronically incarcerated ventral hernia containing loops of small bowel, presents with abdominal pain that has progressively worsened over 1 week with associated nausea, decreased appetite, and infrequent, small BM, no flatus. Patient had CT A/P demonstrating loops of dilated small bowel concerning for SBO, TP is seen in hernia neck, with edema in hernia sac. LA 3.2. No fevers, HD Stable. Patient poor historian, most of F/U at [facility], unsure of names of care providers. Not sure when last C-scope/endoscopy was.   Transferred to [place] for evaluation.  Pt admitted for 2 days for optimization.  1/17 taken to OR for incarcerated ventral hernia causing partial obstruction- repair w/ mashed and adhesion removal     PAST MEDICAL & SURGICAL HISTORY:  Bronchitis  Diabetes mellitus, type 2 (15 Cortes 2019 21:17)      Surgery Information  OR time: 2hrs      EBL: 100         IV Fluids: 1800      Blood Products: none  UOP:  150        PAST MEDICAL & SURGICAL HISTORY:  Bronchitis  Diabetes mellitus, type 2    Home Meds: Home Medications:  benazepril 10 mg oral tablet: 1 tab(s) orally once a day (25 Jun 2018 18:49)  GlipiZIDE XL 10 mg oral tablet, extended release: 1 tab(s) orally once a day (25 Jun 2018 18:49)  Levemir FlexTouch:  (25 Jun 2018 18:49)  metFORMIN 1000 mg oral tablet: 1 tab(s) orally 2 times a day (25 Jun 2018 18:49)    Allergies:     ciprofloxacin (Hives)  penicillin (Rash)    Intolerances      Soc:   Advanced Directives: Presumed Full Code     ROS:    REVIEW OF SYSTEMS    [x ] A ten-point review of systems was otherwise negative except as noted.  [ ] Due to altered mental status/intubation, subjective information were not able to be obtained from the patient. History was obtained, to the extent possible, from review of the chart and collateral sources of information.      CURRENT MEDICATIONS:   --------------------------------------------------------------------------------------  Neurologic Medications  HYDROmorphone  Injectable 0.5 milliGRAM(s) IV Push every 15 minutes PRN Moderate Pain (4 - 6)  HYDROmorphone  Injectable 1 milliGRAM(s) IV Push every 15 minutes PRN Severe Pain (7 - 10)  morphine  - Injectable 1 milliGRAM(s) IV Push every 6 hours  ondansetron Injectable 4 milliGRAM(s) IV Push once PRN Nausea and/or Vomiting  ondansetron Injectable 4 milliGRAM(s) IV Push every 8 hours    Respiratory Medications    Cardiovascular Medications  enalaprilat Injectable 0.625 milliGRAM(s) IV Push every 6 hours  metoprolol tartrate Injectable 2.5 milliGRAM(s) IV Push every 8 hours    Gastrointestinal Medications  dextrose 5%. 1000 milliLiter(s) IV Continuous <Continuous>  dextrose 5%. 1000 milliLiter(s) IV Continuous <Continuous>  lactated ringers. 1000 milliLiter(s) IV Continuous <Continuous>  lactated ringers. 1000 milliLiter(s) IV Continuous <Continuous>  pantoprazole  Injectable 40 milliGRAM(s) IV Push daily    Genitourinary Medications    Hematologic/Oncologic Medications  heparin  Injectable 5000 Unit(s) SubCutaneous every 8 hours    Antimicrobial/Immunologic Medications  clindamycin IVPB 900 milliGRAM(s) IV Intermittent every 8 hours    Endocrine/Metabolic Medications  dextrose 40% Gel 15 Gram(s) Oral once PRN Blood Glucose LESS THAN 70 milliGRAM(s)/deciliter  dextrose 50% Injectable 12.5 Gram(s) IV Push once  dextrose 50% Injectable 25 Gram(s) IV Push once  dextrose 50% Injectable 25 Gram(s) IV Push once  glucagon  Injectable 1 milliGRAM(s) IntraMuscular once PRN Glucose LESS THAN 70 milligrams/deciliter  insulin lispro (HumaLOG) corrective regimen sliding scale   SubCutaneous three times a day before meals    Topical/Other Medications  chlorhexidine 4% Liquid 1 Application(s) Topical <User Schedule>    --------------------------------------------------------------------------------------    VITAL SIGNS, INS/OUTS (last 24 hours):  --------------------------------------------------------------------------------------  ICU Vital Signs Last 24 Hrs  T(C): 37 (17 Jan 2019 11:42), Max: 37.3 (16 Jan 2019 20:33)  T(F): 98.6 (17 Jan 2019 11:42), Max: 99.2 (17 Jan 2019 00:39)  HR: 109 (17 Jan 2019 11:42) (91 - 109)  BP: 134/94 (17 Jan 2019 11:42) (101/56 - 134/94)  BP(mean): --  ABP: --  ABP(mean): --  RR: 18 (17 Jan 2019 11:42) (18 - 18)  SpO2: 98% (17 Jan 2019 11:42) (98% - 98%)    I&O's Summary    16 Jan 2019 07:01  -  17 Jan 2019 07:00  --------------------------------------------------------  IN: 0 mL / OUT: 1050 mL / NET: -1050 mL      --------------------------------------------------------------------------------------    EXAM:  General/Neuro A+O  GCS: 15  Exam: Normal, NAD, alert, oriented, no focal deficits. PERRLA  ***    Respiratory  Exam: Lungs clear to auscultation, Normal expansion/effort.  ***    Cardiovascular  Exam: S1, S2.  Regular rate and rhythm.  Peripheral edema  ***  Cardiac Rhythm: Normal Sinus Rhythm 86    GI  Exam: Abdomen soft, Non-tender, Non-distended.  JULIÁN shunt  Wound:   dry dressing to surgical site to abdomen.   Current Diet:  NPO***      Tubes/Lines/Drains  ***  [x] Peripheral IV  [x] Arterial Line		L. Rad	Date Placed: 1/17/19  [x] Mediport           [x] Urinary Catheter		Date Placed: 1/17/19    Extremities  Exam: Extremities warm, pink, well-perfused.      Derm:  Exam: Good skin turgor, no skin breakdown.      :   Exam: Ureña catheter in place.     LABS  --------------------------------------------------------------------------------------  Labs:  CAPILLARY BLOOD GLUCOSE      POCT Blood Glucose.: 201 mg/dL (17 Jan 2019 12:15)  POCT Blood Glucose.: 233 mg/dL (17 Jan 2019 07:17)  POCT Blood Glucose.: 111 mg/dL (16 Jan 2019 22:17)                          8.1    8.96  )-----------( 185      ( 16 Jan 2019 23:51 )             28.3       Auto Neutrophil %: 58.3 % (01-16-19 @ 23:51)  Auto Immature Granulocyte %: 0.3 % (01-16-19 @ 23:51)    01-16    138  |  96<L>  |  15  ----------------------------<  82  4.4   |  26  |  1.0      Calcium, Total Serum: 9.1 mg/dL (01-16-19 @ 23:51)      LFTs:     Lactate, Blood: 2.0 mmol/L (01-15-19 @ 23:54)  Lactate, Blood: 3.2 mmol/L (01-15-19 @ 10:30)      Coags:     11.50  ----< 1.00    ( 15 Cortes 2019 23:54 )     30.5                    Culture - Urine (collected 15 Cortes 2019 10:30)  Source: .Urine Clean Catch (Midstream)  Final Report (16 Jan 2019 21:52):    No growth        --------------------------------------------------------------------------------------    OTHER LABS    IMAGING RESULTS      ASSESSMENT:  73y Female w/ CHF EF 35, DM, active gastric cancer on chemo, hx of ovarian ca/ s/p hysterectomy and oophorectomy 2 years ago presents for partial sbo 2/2 to  ventral hernia;  s/p repair w/ mesh and removal of adhesions    PLAN:   Neurologic: A+O, follows commands.    Respiratory:   Cardiovascular:   Gastrointestinal/Nutrition:   Renal/Genitourinary:   Hematologic:   Infectious Disease:   Lines/Tubes:  Endocrine:   Disposition:     --------------------------------------------------------------------------------------    Critical Care Diagnoses: SICU Consultation Note  =====================================================  HPI: 73y Female  HPI:   73y Female PMH of DMT2, CHF (EF 35), Ovarian CA s/p BARON and BSO 2y ago, gastric CA s/p chemo follows at [facility] Oncologist Dr. Pratt, has chronically incarcerated ventral hernia containing loops of small bowel, presents with abdominal pain that has progressively worsened over 1 week with associated nausea, decreased appetite, and infrequent, small BM, no flatus. Patient had CT A/P demonstrating loops of dilated small bowel concerning for SBO, TP is seen in hernia neck, with edema in hernia sac. LA 3.2. No fevers, HD Stable. Patient poor historian, most of F/U at [facility], unsure of names of care providers. Not sure when last C-scope/endoscopy was.   Transferred to [place] for evaluation.  Pt admitted for 2 days for optimization.  1/17 taken to OR for incarcerated ventral hernia causing partial obstruction- repair w/ mashed and adhesion removal     PAST MEDICAL & SURGICAL HISTORY:  Bronchitis  Diabetes mellitus, type 2 (15 Cortes 2019 21:17)      Surgery Information  OR time: 2hrs      EBL: 100         IV Fluids: 1800      Blood Products: none  UOP:  150        PAST MEDICAL & SURGICAL HISTORY:  Bronchitis  Diabetes mellitus, type 2    Home Meds: Home Medications:  benazepril 10 mg oral tablet: 1 tab(s) orally once a day (25 Jun 2018 18:49)  GlipiZIDE XL 10 mg oral tablet, extended release: 1 tab(s) orally once a day (25 Jun 2018 18:49)  Levemir FlexTouch:  (25 Jun 2018 18:49)  metFORMIN 1000 mg oral tablet: 1 tab(s) orally 2 times a day (25 Jun 2018 18:49)    Allergies:     ciprofloxacin (Hives)  penicillin (Rash)    Intolerances      Soc:   Advanced Directives: Presumed Full Code     ROS:    REVIEW OF SYSTEMS    [x ] A ten-point review of systems was otherwise negative except as noted.  [ ] Due to altered mental status/intubation, subjective information were not able to be obtained from the patient. History was obtained, to the extent possible, from review of the chart and collateral sources of information.      CURRENT MEDICATIONS:   --------------------------------------------------------------------------------------  Neurologic Medications  HYDROmorphone  Injectable 0.5 milliGRAM(s) IV Push every 15 minutes PRN Moderate Pain (4 - 6)  HYDROmorphone  Injectable 1 milliGRAM(s) IV Push every 15 minutes PRN Severe Pain (7 - 10)  morphine  - Injectable 1 milliGRAM(s) IV Push every 6 hours  ondansetron Injectable 4 milliGRAM(s) IV Push once PRN Nausea and/or Vomiting  ondansetron Injectable 4 milliGRAM(s) IV Push every 8 hours    Respiratory Medications    Cardiovascular Medications  enalaprilat Injectable 0.625 milliGRAM(s) IV Push every 6 hours  metoprolol tartrate Injectable 2.5 milliGRAM(s) IV Push every 8 hours    Gastrointestinal Medications  dextrose 5%. 1000 milliLiter(s) IV Continuous <Continuous>  dextrose 5%. 1000 milliLiter(s) IV Continuous <Continuous>  lactated ringers. 1000 milliLiter(s) IV Continuous <Continuous>  lactated ringers. 1000 milliLiter(s) IV Continuous <Continuous>  pantoprazole  Injectable 40 milliGRAM(s) IV Push daily    Genitourinary Medications    Hematologic/Oncologic Medications  heparin  Injectable 5000 Unit(s) SubCutaneous every 8 hours    Antimicrobial/Immunologic Medications  clindamycin IVPB 900 milliGRAM(s) IV Intermittent every 8 hours    Endocrine/Metabolic Medications  dextrose 40% Gel 15 Gram(s) Oral once PRN Blood Glucose LESS THAN 70 milliGRAM(s)/deciliter  dextrose 50% Injectable 12.5 Gram(s) IV Push once  dextrose 50% Injectable 25 Gram(s) IV Push once  dextrose 50% Injectable 25 Gram(s) IV Push once  glucagon  Injectable 1 milliGRAM(s) IntraMuscular once PRN Glucose LESS THAN 70 milligrams/deciliter  insulin lispro (HumaLOG) corrective regimen sliding scale   SubCutaneous three times a day before meals    Topical/Other Medications  chlorhexidine 4% Liquid 1 Application(s) Topical <User Schedule>    --------------------------------------------------------------------------------------    VITAL SIGNS, INS/OUTS (last 24 hours):  --------------------------------------------------------------------------------------  ICU Vital Signs Last 24 Hrs  T(C): 37 (17 Jan 2019 11:42), Max: 37.3 (16 Jan 2019 20:33)  T(F): 98.6 (17 Jan 2019 11:42), Max: 99.2 (17 Jan 2019 00:39)  HR: 109 (17 Jan 2019 11:42) (91 - 109)  BP: 134/94 (17 Jan 2019 11:42) (101/56 - 134/94)  BP(mean): --  ABP: --  ABP(mean): --  RR: 18 (17 Jan 2019 11:42) (18 - 18)  SpO2: 98% (17 Jan 2019 11:42) (98% - 98%)    I&O's Summary    16 Jan 2019 07:01  -  17 Jan 2019 07:00  --------------------------------------------------------  IN: 0 mL / OUT: 1050 mL / NET: -1050 mL      --------------------------------------------------------------------------------------    EXAM:  General/Neuro A+O  GCS: 15  Exam: Normal, NAD, alert, oriented, no focal deficits. PERRLA  ***    Respiratory  Exam: Lungs clear to auscultation, Normal expansion/effort.  ***    Cardiovascular  Exam: S1, S2.  Regular rate and rhythm.  Peripheral edema  ***  Cardiac Rhythm: Normal Sinus Rhythm 86    GI  Exam: Abdomen soft, Non-tender, Non-distended.  JULIÁN shunt  Wound:   dry dressing to surgical site to abdomen.   Current Diet:  NPO***      Tubes/Lines/Drains  ***  [x] Peripheral IV  [x] Arterial Line		L. Rad	Date Placed: 1/17/19  [x] Mediport           [x] Urinary Catheter		Date Placed: 1/17/19    Extremities  Exam: Extremities warm, pink, well-perfused.      Derm:  Exam: Good skin turgor, no skin breakdown.      :   Exam: Urñea catheter in place.     LABS  --------------------------------------------------------------------------------------  Labs:  CAPILLARY BLOOD GLUCOSE      POCT Blood Glucose.: 201 mg/dL (17 Jan 2019 12:15)  POCT Blood Glucose.: 233 mg/dL (17 Jan 2019 07:17)  POCT Blood Glucose.: 111 mg/dL (16 Jan 2019 22:17)                          8.1    8.96  )-----------( 185      ( 16 Jan 2019 23:51 )             28.3       Auto Neutrophil %: 58.3 % (01-16-19 @ 23:51)  Auto Immature Granulocyte %: 0.3 % (01-16-19 @ 23:51)    01-16    138  |  96<L>  |  15  ----------------------------<  82  4.4   |  26  |  1.0      Calcium, Total Serum: 9.1 mg/dL (01-16-19 @ 23:51)      LFTs:     Lactate, Blood: 2.0 mmol/L (01-15-19 @ 23:54)  Lactate, Blood: 3.2 mmol/L (01-15-19 @ 10:30)      Coags:     11.50  ----< 1.00    ( 15 Cortes 2019 23:54 )     30.5                    Culture - Urine (collected 15 Cortes 2019 10:30)  Source: .Urine Clean Catch (Midstream)  Final Report (16 Jan 2019 21:52):    No growth        --------------------------------------------------------------------------------------    OTHER LABS    IMAGING RESULTS  < from: CT Abdomen and Pelvis w/ IV Cont (01.15.19 @ 14:10) >  Multiple dilated loops of small bowel, leading up to a transition point   located within a large ventral wall abdominal hernia. Findings are   consistent with small bowel obstruction.    There is a 6 mm hypodensity at the junction of the body and tail the   pancreas, incompletely evaluated. This may represent a side branch I PMN.   However, further evaluation with pancreatic MRI with and without contrast   and MRCP imaging is recommended    < end of copied text >

## 2019-01-17 NOTE — PROGRESS NOTE ADULT - ASSESSMENT
73y Female PMH of DMT2, CHF, Ovarian CA s/p BARON and BSO 2y ago, gastric CA s/p chemo follows at [facility] Oncologist Dr. Pratt, has chronically incarcerated ventral hernia containing loops of small bowel, presents with abdominal pain that has progressively worsened over 1 week with associated nausea, decreased appetite, and infrequent, small BM, no flatus. Patient had CT A/P demonstrating loops of dilated small bowel concerning for SBO,  LA 3.2. No fevers.     # SBO from incarcerated hernia- on NG tube suction and will go for exploratory laprotomy today.    # Chr systolic CHF-- EF is < from: Transthoracic Echocardiogram (01.16.19 @ 17:18) >   1. Left ventricular ejection fraction, by visual estimation, is 35 to   40%.   2. Multiple left ventricular regional wall motion abnormalities exist.   See wall motion findings.   3. Mildly enlarged left atrium.   4. Moderate mitral annular calcification.   5. Moderate mitral valve regurgitation.   6. Mild tricuspid regurgitation.   7. Trace pulmonic valve regurgitation.   8. Estimated pulmonary artery systolic pressureis 55.2 mmHg assuming a   right atrial pressure of 10 mmHg, which is consistent with moderate   pulmonary hypertension.    Patient follows with Dr Garcia and is aware that her heart was weak and was on lasix-- she has no previous echo in our records and likely may have done it with Dr Garcia. Presently not in chf exacerbation.  Seen by Dr Browne and has hx of CAD in the past-- cleared by cardiology. BP is under control--small dose of IV being given.    # Hx of gastric surgery and ovarian cancer s/p BARON-- seen by Dr last [id number] and in remission since then and now requesting MRCP for pancreatic lesion.    #anemia-- on procrit shots as per oncology.  #DM-- was on oral agents at home along with insulin  and is NPO now  patient is at moderate risk for surgery considering cardiac risk factors

## 2019-01-17 NOTE — PROGRESS NOTE ADULT - ASSESSMENT
72 yo F with hx of ovarian cancer,last chemo in 2017,in remission since then.  chronic anemia,on procrit weekly  admitted with abdo pain ,found to have   SBO and t-point in large chronically incarcerated incisional hernia    Plan  NPO with NGT suction.  going for Sx today.  on iv hydration   monitor electrolytes  start on procrit 40,000 unit qweekly,due on wednesdays  transfuse to keep hb >7  cont other supportive care.  will need MRCP to evaluate pancreatic lesion,can be done as outpt when acute issue is resolved.  cont other supportive care as per Sx.  will f/u

## 2019-01-17 NOTE — PROGRESS NOTE ADULT - SUBJECTIVE AND OBJECTIVE BOX
SUBJECTIVE:    Patient is a 73y old Female who presents with a chief complaint of Abdominal pain (17 Jan 2019 11:25)    Currently admitted to medicine with the primary diagnosis of Small bowel obstruction     Today is hospital day 2d.     PAST MEDICAL & SURGICAL HISTORY  Bronchitis  Diabetes mellitus, type 2    ALLERGIES:  ciprofloxacin (Hives)  penicillin (Rash)    MEDICATIONS:  STANDING MEDICATIONS  chlorhexidine 4% Liquid 1 Application(s) Topical <User Schedule>  dextrose 5% + sodium chloride 0.45%. 1000 milliLiter(s) IV Continuous <Continuous>  dextrose 5%. 1000 milliLiter(s) IV Continuous <Continuous>  dextrose 5%. 1000 milliLiter(s) IV Continuous <Continuous>  dextrose 50% Injectable 12.5 Gram(s) IV Push once  dextrose 50% Injectable 25 Gram(s) IV Push once  dextrose 50% Injectable 25 Gram(s) IV Push once  enalaprilat Injectable 0.625 milliGRAM(s) IV Push every 6 hours  heparin  Injectable 5000 Unit(s) SubCutaneous every 8 hours  insulin lispro (HumaLOG) corrective regimen sliding scale   SubCutaneous three times a day before meals  metoprolol tartrate Injectable 2.5 milliGRAM(s) IV Push every 8 hours  morphine  - Injectable 1 milliGRAM(s) IV Push every 6 hours  ondansetron Injectable 4 milliGRAM(s) IV Push every 8 hours  pantoprazole  Injectable 40 milliGRAM(s) IV Push daily    PRN MEDICATIONS  dextrose 40% Gel 15 Gram(s) Oral once PRN  glucagon  Injectable 1 milliGRAM(s) IntraMuscular once PRN    VITALS:   T(F): 98  HR: 91  BP: 101/56  RR: 18  SpO2: 98%    LABS:                        8.1    8.96  )-----------( 185      ( 16 Jan 2019 23:51 )             28.3     01-16    138  |  96<L>  |  15  ----------------------------<  82  4.4   |  26  |  1.0    Ca    9.1      16 Jan 2019 23:51  Phos  3.5     01-16  Mg     2.0     01-16      PT/INR - ( 15 Cortes 2019 23:54 )   PT: 11.50 sec;   INR: 1.00 ratio         PTT - ( 15 Cortes 2019 23:54 )  PTT:30.5 sec          Culture - Urine (collected 15 Cortes 2019 10:30)  Source: .Urine Clean Catch (Midstream)  Final Report (16 Jan 2019 21:52):    No growth          RADIOLOGY:    PHYSICAL EXAM:  GEN: No acute distress  LUNGS: Clear to auscultation bilaterally   HEART: S1/S2 present. RRR.   ABD/ GI: Soft, non-tender, distended. Bowel sounds not present  EXT: NC/NC/NE/2+PP/TAYLOR  NEURO: [id number]

## 2019-01-17 NOTE — CHART NOTE - NSCHARTNOTEFT_GEN_A_CORE
PACU ANESTHESIA ADMISSION NOTE      Procedure: Repair of incarcerated ventral hernia with mesh    Post op diagnosis:  Incarcerated ventral hernia      ____  Intubated  TV:______       Rate: ______      FiO2: ______    _x___  Patent Airway    _x___  Full return of protective reflexes    ____  Full recovery from anesthesia / back to baseline status    Vitals:            T:   98.2             BP :  132/77              R:   17           Sat:  97%             P: 114      Mental Status:  _x___ Awake   _____ Alert   _____ Drowsy   _____ Sedated    Nausea/Vomiting:  _x___  NO       ______Yes,   See Post - Op Orders         Pain Scale (0-10):  __0___    Treatment: _x__ None    ___x_ See Post - Op/PCA Orders    Post - Operative Fluids:   ____ Oral   __x__ See Post - Op Orders    Plan: Discharge:   ___Home       __x___Floor     _____Critical Care    _____  Other:_________________    Comments:  Intra-op - tachycardic in the beginning and in the end. Pre- induction A-line placed; small left upper lip laceration on intubation. Uneventful extubation.  Critical care consult - report given to ICU team.

## 2019-01-17 NOTE — PROGRESS NOTE ADULT - SUBJECTIVE AND OBJECTIVE BOX
Progress Note: General Surgery  Patient: JILLIAN SEE , 73y (1945)Female   MRN: [id number]  Location: [address]  Visit: 01-15-19 Inpatient  Date: 01-17-19 @ 10:31    Procedure/Diagnosis: SBO, chronic incarcerated hernia    Events/ 24h: No acute events overnight. Pain controlled.    Vitals: T(F): 98 (01-17-19 @ 04:52), Max: 99.2 (01-17-19 @ 00:39)  HR: 91 (01-17-19 @ 04:52)  BP: 101/56 (01-17-19 @ 04:52) (101/56 - 122/61)  RR: 18 (01-17-19 @ 04:52)  SpO2: 98% (01-16-19 @ 13:06)    In:   01-16-19 @ 07:01  -  01-17-19 @ 07:00  --------------------------------------------------------  IN: 0 mL      Out:   01-16-19 @ 07:01  -  01-17-19 @ 07:00  --------------------------------------------------------  OUT:    Indwelling Catheter - Urethral: 850 mL    Nasoenteral Tube: 200 mL  Total OUT: 1050 mL        Net:   01-16-19 @ 07:01  -  01-17-19 @ 07:00  --------------------------------------------------------  NET: -1050 mL        Diet: Diet, NPO after Midnight:      NPO Start Date: 16-Jan-2019,   NPO Start Time: 23:59 (01-16-19 @ 19:45)    IV Fluids: dextrose 5% + sodium chloride 0.45%. 1000 milliLiter(s) (100 mL/Hr) IV Continuous <Continuous>  dextrose 5%. 1000 milliLiter(s) (50 mL/Hr) IV Continuous <Continuous>  dextrose 5%. 1000 milliLiter(s) (50 mL/Hr) IV Continuous <Continuous>      Physical Examination:  General Appearance: NAD  HEENT: EOMI, sclera non-icteric.  Heart: RRR   Lungs: CTABL.   Abdomen:  Soft, nontender, nondistended. No rigidity, guarding, or rebound tenderness.   MSK/Extremities: Warm & well-perfused. Peripheral pulses intact.  Skin: Warm, dry. No jaundice.       Medications: [Standing]  chlorhexidine 4% Liquid 1 Application(s) Topical <User Schedule>  dextrose 5% + sodium chloride 0.45%. 1000 milliLiter(s) (100 mL/Hr) IV Continuous <Continuous>  dextrose 5%. 1000 milliLiter(s) (50 mL/Hr) IV Continuous <Continuous>  dextrose 5%. 1000 milliLiter(s) (50 mL/Hr) IV Continuous <Continuous>  dextrose 50% Injectable 12.5 Gram(s) IV Push once  dextrose 50% Injectable 25 Gram(s) IV Push once  dextrose 50% Injectable 25 Gram(s) IV Push once  enalaprilat Injectable 0.625 milliGRAM(s) IV Push every 6 hours  heparin  Injectable 5000 Unit(s) SubCutaneous every 8 hours  insulin lispro (HumaLOG) corrective regimen sliding scale   SubCutaneous three times a day before meals  metoprolol tartrate Injectable 2.5 milliGRAM(s) IV Push every 8 hours  morphine  - Injectable 1 milliGRAM(s) IV Push every 6 hours  ondansetron Injectable 4 milliGRAM(s) IV Push every 8 hours  pantoprazole  Injectable 40 milliGRAM(s) IV Push daily    DVT Prophylaxis: heparin  Injectable 5000 Unit(s) SubCutaneous every 8 hours    GI Prophylaxis: pantoprazole  Injectable 40 milliGRAM(s) IV Push daily    Antibiotics:   Anticoagulation:   Medications:[PRN]  dextrose 40% Gel 15 Gram(s) Oral once PRN  glucagon  Injectable 1 milliGRAM(s) IntraMuscular once PRN      Labs:                        8.1    8.96  )-----------( 185      ( 16 Jan 2019 23:51 )             28.3     01-16    138  |  96<L>  |  15  ----------------------------<  82  4.4   |  26  |  1.0    Ca    9.1      16 Jan 2019 23:51  Phos  3.5     01-16  Mg     2.0     01-16        PT/INR - ( 15 Cortes 2019 23:54 )   PT: 11.50 sec;   INR: 1.00 ratio         PTT - ( 15 Cortes 2019 23:54 )  PTT:30.5 sec        Urine/Micro:    Culture - Urine (collected 15 Cortes 2019 10:30)  Source: .Urine Clean Catch (Midstream)  Final Report (16 Jan 2019 21:52):    No growth          Imaging:     < from: CT Abdomen and Pelvis w/ IV Cont (01.15.19 @ 14:10) >  Multiple dilated loops of small bowel, leading up to a transition point   located within a large ventral wall abdominal hernia. Findings are   consistent with small bowel obstruction.    There is a 6 mm hypodensity at the junction of the body and tail the   pancreas, incompletely evaluated. This may represent a side branch I PMN.   However, further evaluation with pancreatic MRI with and without contrast   and MRCP imaging is recommended    < end of copied text >    Assessment:  73y Female patient admitted w/ SBO, chronic incarcerated hernia    Plan:    NPO  IVF  NGT to low wall suction  Ureña  Echo: EF 35-40%  Anesthesia: low risk  F/u med clear  F/u cards clear  DVT/GI ppx  OOBAT  IS  Pain control    Date/Time: 01-17-19 @ 10:31

## 2019-01-17 NOTE — CONSULT NOTE ADULT - ATTENDING COMMENTS
reviewed and revised above  agree with sicu admission for risk of cardiovascular deterioration  hypotension in the setting of cardiac disease

## 2019-01-17 NOTE — CONSULT NOTE ADULT - ATTENDING COMMENTS
pt w/ evidence for CAD w/ prior w/u. Pt at least moderate to high risk for surgery. No further cardiac intervention advised at current time.

## 2019-01-17 NOTE — CONSULT NOTE ADULT - SUBJECTIVE AND OBJECTIVE BOX
Patient is a 73y old  Female who presents with a chief complaint of Abdominal pain (2019 10:31)      HPI:  HPI:   73y Female PMH of DMT2, CHF, Ovarian CA s/p BARON and BSO 2y ago, gastric CA s/p chemo follows at [facility] Oncologist Dr. Pratt, has chronically incarcerated ventral hernia containing loops of small bowel, presents with abdominal pain that has progressively worsened over 1 week with associated nausea, decreased appetite, and infrequent, small BM, no flatus. Patient had CT A/P demonstrating loops of dilated small bowel concerning for SBO, TP is seen in hernia neck, with edema in hernia sac. LA 3.2. No fevers, HD Stable. Patient poor historian, most of F/U at [facility], unsure of names of care providers. Not sure when last C-scope/endoscopy was.   Transferred to [place] for evaluation. Pt followed by Dr. Garcia, Pt had gamboa w/o cp. pt w/  stress echo w/ fixed defect w/o definite ischemia to 75% MPHR. Submax. Pt had improved symptoms w/ cardiac rehab,    PAST MEDICAL & SURGICAL HISTORY:  Bronchitis  Diabetes mellitus, type 2 (15 Cortes 2019 21:17)      PAST MEDICAL & SURGICAL HISTORY:  Bronchitis  Diabetes mellitus, type 2                      PREVIOUS DIAGNOSTIC TESTING:      ECHO  FINDINGS:    STRESS  FINDINGS:    CATHETERIZATION  FINDINGS:    MEDICATIONS  (STANDING):  chlorhexidine 4% Liquid 1 Application(s) Topical <User Schedule>  dextrose 5% + sodium chloride 0.45%. 1000 milliLiter(s) (100 mL/Hr) IV Continuous <Continuous>  dextrose 5%. 1000 milliLiter(s) (50 mL/Hr) IV Continuous <Continuous>  dextrose 5%. 1000 milliLiter(s) (50 mL/Hr) IV Continuous <Continuous>  dextrose 50% Injectable 12.5 Gram(s) IV Push once  dextrose 50% Injectable 25 Gram(s) IV Push once  dextrose 50% Injectable 25 Gram(s) IV Push once  enalaprilat Injectable 0.625 milliGRAM(s) IV Push every 6 hours  heparin  Injectable 5000 Unit(s) SubCutaneous every 8 hours  insulin lispro (HumaLOG) corrective regimen sliding scale   SubCutaneous three times a day before meals  metoprolol tartrate Injectable 2.5 milliGRAM(s) IV Push every 8 hours  morphine  - Injectable 1 milliGRAM(s) IV Push every 6 hours  ondansetron Injectable 4 milliGRAM(s) IV Push every 8 hours  pantoprazole  Injectable 40 milliGRAM(s) IV Push daily    MEDICATIONS  (PRN):  dextrose 40% Gel 15 Gram(s) Oral once PRN Blood Glucose LESS THAN 70 milliGRAM(s)/deciliter  glucagon  Injectable 1 milliGRAM(s) IntraMuscular once PRN Glucose LESS THAN 70 milligrams/deciliter      FAMILY HISTORY:      SOCIAL HISTORY:    CIGARETTES:    ALCOHOL:        Vital Signs Last 24 Hrs  T(C): 36.7 (2019 04:52), Max: 37.3 (2019 20:33)  T(F): 98 (2019 04:52), Max: 99.2 (2019 00:39)  HR: 91 (2019 04:52) (91 - 100)  BP: 101/56 (2019 04:52) (101/56 - 122/61)  BP(mean): --  RR: 18 (2019 04:52) (18 - 18)  SpO2: 98% (2019 13:06) (98% - 98%)            INTERPRETATION OF TELEMETRY:    ECG:    I&O's Detail    2019 07:01  -  2019 07:00  --------------------------------------------------------  IN:  Total IN: 0 mL    OUT:    Indwelling Catheter - Urethral: 850 mL    Nasoenteral Tube: 200 mL  Total OUT: 1050 mL    Total NET: -1050 mL          LABS:                        8.1    8.96  )-----------( 185      ( 2019 23:51 )             28.3     01-16    138  |  96<L>  |  15  ----------------------------<  82  4.4   |  26  |  1.0    Ca    9.1      2019 23:51  Phos  3.5     -16  Mg     2.0     01-16          PT/INR - ( 15 Cortes 2019 23:54 )   PT: 11.50 sec;   INR: 1.00 ratio         PTT - ( 15 Cortes 2019 23:54 )  PTT:30.5 sec    I&O's Summary    2019 07:01  -  2019 07:00  --------------------------------------------------------  IN: 0 mL / OUT: 1050 mL / NET: -1050 mL      BNP  RADIOLOGY & ADDITIONAL STUDIES:

## 2019-01-17 NOTE — BRIEF OPERATIVE NOTE - OPERATION/FINDINGS
Repair of incarcerated ventral hernia using ventralight ST mesh Repair of incarcerated ventral hernia using Ventralex mesh

## 2019-01-18 LAB
ANION GAP SERPL CALC-SCNC: 12 MMOL/L — SIGNIFICANT CHANGE UP (ref 7–14)
ANION GAP SERPL CALC-SCNC: 18 MMOL/L — HIGH (ref 7–14)
APTT BLD: 26 SEC — LOW (ref 27–39.2)
APTT BLD: 30.2 SEC — SIGNIFICANT CHANGE UP (ref 27–39.2)
BUN SERPL-MCNC: 15 MG/DL — SIGNIFICANT CHANGE UP (ref 10–20)
BUN SERPL-MCNC: 16 MG/DL — SIGNIFICANT CHANGE UP (ref 10–20)
CALCIUM SERPL-MCNC: 8.1 MG/DL — LOW (ref 8.5–10.1)
CALCIUM SERPL-MCNC: 8.2 MG/DL — LOW (ref 8.5–10.1)
CHLORIDE SERPL-SCNC: 100 MMOL/L — SIGNIFICANT CHANGE UP (ref 98–110)
CHLORIDE SERPL-SCNC: 95 MMOL/L — LOW (ref 98–110)
CK MB CFR SERPL CALC: 1.6 NG/ML — SIGNIFICANT CHANGE UP (ref 0.6–6.3)
CK MB CFR SERPL CALC: 1.8 NG/ML — SIGNIFICANT CHANGE UP (ref 0.6–6.3)
CK SERPL-CCNC: 47 U/L — SIGNIFICANT CHANGE UP (ref 0–225)
CO2 SERPL-SCNC: 19 MMOL/L — SIGNIFICANT CHANGE UP (ref 17–32)
CO2 SERPL-SCNC: 25 MMOL/L — SIGNIFICANT CHANGE UP (ref 17–32)
CREAT SERPL-MCNC: 0.9 MG/DL — SIGNIFICANT CHANGE UP (ref 0.7–1.5)
CREAT SERPL-MCNC: 1.1 MG/DL — SIGNIFICANT CHANGE UP (ref 0.7–1.5)
GLUCOSE BLDC GLUCOMTR-MCNC: 190 MG/DL — HIGH (ref 70–99)
GLUCOSE BLDC GLUCOMTR-MCNC: 193 MG/DL — HIGH (ref 70–99)
GLUCOSE BLDC GLUCOMTR-MCNC: 206 MG/DL — HIGH (ref 70–99)
GLUCOSE BLDC GLUCOMTR-MCNC: 222 MG/DL — HIGH (ref 70–99)
GLUCOSE BLDC GLUCOMTR-MCNC: 222 MG/DL — HIGH (ref 70–99)
GLUCOSE BLDC GLUCOMTR-MCNC: 234 MG/DL — HIGH (ref 70–99)
GLUCOSE BLDC GLUCOMTR-MCNC: 234 MG/DL — HIGH (ref 70–99)
GLUCOSE SERPL-MCNC: 220 MG/DL — HIGH (ref 70–99)
GLUCOSE SERPL-MCNC: 223 MG/DL — HIGH (ref 70–99)
HCT VFR BLD CALC: 23.4 % — LOW (ref 37–47)
HCT VFR BLD CALC: 27.2 % — LOW (ref 37–47)
HCT VFR BLD CALC: 27.5 % — LOW (ref 37–47)
HCT VFR BLD CALC: 28 % — LOW (ref 37–47)
HGB BLD-MCNC: 6.8 G/DL — CRITICAL LOW (ref 12–16)
HGB BLD-MCNC: 8 G/DL — LOW (ref 12–16)
HGB BLD-MCNC: 8.2 G/DL — LOW (ref 12–16)
HGB BLD-MCNC: 8.4 G/DL — LOW (ref 12–16)
INR BLD: 1.17 RATIO — SIGNIFICANT CHANGE UP (ref 0.65–1.3)
INR BLD: 1.18 RATIO — SIGNIFICANT CHANGE UP (ref 0.65–1.3)
MAGNESIUM SERPL-MCNC: 1.9 MG/DL — SIGNIFICANT CHANGE UP (ref 1.8–2.4)
MAGNESIUM SERPL-MCNC: 2.5 MG/DL — HIGH (ref 1.8–2.4)
MCHC RBC-ENTMCNC: 25.2 PG — LOW (ref 27–31)
MCHC RBC-ENTMCNC: 25.9 PG — LOW (ref 27–31)
MCHC RBC-ENTMCNC: 26 PG — LOW (ref 27–31)
MCHC RBC-ENTMCNC: 26.2 PG — LOW (ref 27–31)
MCHC RBC-ENTMCNC: 29.1 G/DL — LOW (ref 32–37)
MCHC RBC-ENTMCNC: 29.4 G/DL — LOW (ref 32–37)
MCHC RBC-ENTMCNC: 29.8 G/DL — LOW (ref 32–37)
MCHC RBC-ENTMCNC: 30 G/DL — LOW (ref 32–37)
MCV RBC AUTO: 86.7 FL — SIGNIFICANT CHANGE UP (ref 81–99)
MCV RBC AUTO: 86.7 FL — SIGNIFICANT CHANGE UP (ref 81–99)
MCV RBC AUTO: 87 FL — SIGNIFICANT CHANGE UP (ref 81–99)
MCV RBC AUTO: 89.2 FL — SIGNIFICANT CHANGE UP (ref 81–99)
NRBC # BLD: 0 /100 WBCS — SIGNIFICANT CHANGE UP (ref 0–0)
PHOSPHATE SERPL-MCNC: 2.5 MG/DL — SIGNIFICANT CHANGE UP (ref 2.1–4.9)
PHOSPHATE SERPL-MCNC: 3.6 MG/DL — SIGNIFICANT CHANGE UP (ref 2.1–4.9)
PLATELET # BLD AUTO: 147 K/UL — SIGNIFICANT CHANGE UP (ref 130–400)
PLATELET # BLD AUTO: 152 K/UL — SIGNIFICANT CHANGE UP (ref 130–400)
PLATELET # BLD AUTO: 155 K/UL — SIGNIFICANT CHANGE UP (ref 130–400)
PLATELET # BLD AUTO: 158 K/UL — SIGNIFICANT CHANGE UP (ref 130–400)
POTASSIUM SERPL-MCNC: 4.2 MMOL/L — SIGNIFICANT CHANGE UP (ref 3.5–5)
POTASSIUM SERPL-MCNC: 4.2 MMOL/L — SIGNIFICANT CHANGE UP (ref 3.5–5)
POTASSIUM SERPL-SCNC: 4.2 MMOL/L — SIGNIFICANT CHANGE UP (ref 3.5–5)
POTASSIUM SERPL-SCNC: 4.2 MMOL/L — SIGNIFICANT CHANGE UP (ref 3.5–5)
PROTHROM AB SERPL-ACNC: 13.4 SEC — HIGH (ref 9.95–12.87)
PROTHROM AB SERPL-ACNC: 13.6 SEC — HIGH (ref 9.95–12.87)
RBC # BLD: 2.7 M/UL — LOW (ref 4.2–5.4)
RBC # BLD: 3.05 M/UL — LOW (ref 4.2–5.4)
RBC # BLD: 3.16 M/UL — LOW (ref 4.2–5.4)
RBC # BLD: 3.23 M/UL — LOW (ref 4.2–5.4)
RBC # FLD: 16.5 % — HIGH (ref 11.5–14.5)
RBC # FLD: 16.6 % — HIGH (ref 11.5–14.5)
RBC # FLD: 17 % — HIGH (ref 11.5–14.5)
RBC # FLD: 17.2 % — HIGH (ref 11.5–14.5)
SODIUM SERPL-SCNC: 132 MMOL/L — LOW (ref 135–146)
SODIUM SERPL-SCNC: 137 MMOL/L — SIGNIFICANT CHANGE UP (ref 135–146)
TROPONIN T SERPL-MCNC: <0.01 NG/ML — SIGNIFICANT CHANGE UP
TROPONIN T SERPL-MCNC: <0.01 NG/ML — SIGNIFICANT CHANGE UP
WBC # BLD: 11.31 K/UL — HIGH (ref 4.8–10.8)
WBC # BLD: 7.51 K/UL — SIGNIFICANT CHANGE UP (ref 4.8–10.8)
WBC # BLD: 8.43 K/UL — SIGNIFICANT CHANGE UP (ref 4.8–10.8)
WBC # BLD: 9.17 K/UL — SIGNIFICANT CHANGE UP (ref 4.8–10.8)
WBC # FLD AUTO: 11.31 K/UL — HIGH (ref 4.8–10.8)
WBC # FLD AUTO: 7.51 K/UL — SIGNIFICANT CHANGE UP (ref 4.8–10.8)
WBC # FLD AUTO: 8.43 K/UL — SIGNIFICANT CHANGE UP (ref 4.8–10.8)
WBC # FLD AUTO: 9.17 K/UL — SIGNIFICANT CHANGE UP (ref 4.8–10.8)

## 2019-01-18 PROCEDURE — 99291 CRITICAL CARE FIRST HOUR: CPT | Mod: 24

## 2019-01-18 PROCEDURE — 99024 POSTOP FOLLOW-UP VISIT: CPT

## 2019-01-18 RX ORDER — MAGNESIUM SULFATE 500 MG/ML
1 VIAL (ML) INJECTION ONCE
Qty: 0 | Refills: 0 | Status: COMPLETED | OUTPATIENT
Start: 2019-01-18 | End: 2019-01-19

## 2019-01-18 RX ORDER — GLUCAGON INJECTION, SOLUTION 0.5 MG/.1ML
1 INJECTION, SOLUTION SUBCUTANEOUS ONCE
Qty: 0 | Refills: 0 | Status: DISCONTINUED | OUTPATIENT
Start: 2019-01-18 | End: 2019-01-20

## 2019-01-18 RX ORDER — HYDROMORPHONE HYDROCHLORIDE 2 MG/ML
0.25 INJECTION INTRAMUSCULAR; INTRAVENOUS; SUBCUTANEOUS EVERY 4 HOURS
Qty: 0 | Refills: 0 | Status: DISCONTINUED | OUTPATIENT
Start: 2019-01-18 | End: 2019-01-19

## 2019-01-18 RX ORDER — SODIUM CHLORIDE 9 MG/ML
250 INJECTION INTRAMUSCULAR; INTRAVENOUS; SUBCUTANEOUS ONCE
Qty: 0 | Refills: 0 | Status: COMPLETED | OUTPATIENT
Start: 2019-01-18 | End: 2019-01-18

## 2019-01-18 RX ORDER — POTASSIUM PHOSPHATE, MONOBASIC POTASSIUM PHOSPHATE, DIBASIC 236; 224 MG/ML; MG/ML
15 INJECTION, SOLUTION INTRAVENOUS ONCE
Qty: 0 | Refills: 0 | Status: COMPLETED | OUTPATIENT
Start: 2019-01-18 | End: 2019-01-19

## 2019-01-18 RX ORDER — SODIUM CHLORIDE 9 MG/ML
250 INJECTION INTRAMUSCULAR; INTRAVENOUS; SUBCUTANEOUS ONCE
Qty: 0 | Refills: 0 | Status: COMPLETED | OUTPATIENT
Start: 2019-01-18 | End: 2019-01-19

## 2019-01-18 RX ORDER — AMIODARONE HYDROCHLORIDE 400 MG/1
0.5 TABLET ORAL
Qty: 900 | Refills: 0 | Status: DISCONTINUED | OUTPATIENT
Start: 2019-01-18 | End: 2019-01-19

## 2019-01-18 RX ORDER — METOPROLOL TARTRATE 50 MG
25 TABLET ORAL
Qty: 0 | Refills: 0 | Status: DISCONTINUED | OUTPATIENT
Start: 2019-01-18 | End: 2019-01-20

## 2019-01-18 RX ORDER — DEXTROSE 50 % IN WATER 50 %
15 SYRINGE (ML) INTRAVENOUS ONCE
Qty: 0 | Refills: 0 | Status: DISCONTINUED | OUTPATIENT
Start: 2019-01-18 | End: 2019-01-20

## 2019-01-18 RX ORDER — LISINOPRIL 2.5 MG/1
10 TABLET ORAL DAILY
Qty: 0 | Refills: 0 | Status: DISCONTINUED | OUTPATIENT
Start: 2019-01-18 | End: 2019-01-18

## 2019-01-18 RX ORDER — HYDROMORPHONE HYDROCHLORIDE 2 MG/ML
0.5 INJECTION INTRAMUSCULAR; INTRAVENOUS; SUBCUTANEOUS EVERY 4 HOURS
Qty: 0 | Refills: 0 | Status: DISCONTINUED | OUTPATIENT
Start: 2019-01-18 | End: 2019-01-19

## 2019-01-18 RX ORDER — INSULIN LISPRO 100/ML
VIAL (ML) SUBCUTANEOUS
Qty: 0 | Refills: 0 | Status: DISCONTINUED | OUTPATIENT
Start: 2019-01-18 | End: 2019-01-19

## 2019-01-18 RX ORDER — AMIODARONE HYDROCHLORIDE 400 MG/1
150 TABLET ORAL ONCE
Qty: 0 | Refills: 0 | Status: COMPLETED | OUTPATIENT
Start: 2019-01-18 | End: 2019-01-18

## 2019-01-18 RX ORDER — METOPROLOL TARTRATE 50 MG
2.5 TABLET ORAL EVERY 8 HOURS
Qty: 0 | Refills: 0 | Status: DISCONTINUED | OUTPATIENT
Start: 2019-01-18 | End: 2019-01-18

## 2019-01-18 RX ORDER — AMIODARONE HYDROCHLORIDE 400 MG/1
1 TABLET ORAL
Qty: 900 | Refills: 0 | Status: DISCONTINUED | OUTPATIENT
Start: 2019-01-18 | End: 2019-01-19

## 2019-01-18 RX ORDER — AMIODARONE HYDROCHLORIDE 400 MG/1
0.5 TABLET ORAL
Qty: 900 | Refills: 0 | Status: DISCONTINUED | OUTPATIENT
Start: 2019-01-18 | End: 2019-01-18

## 2019-01-18 RX ORDER — METOPROLOL TARTRATE 50 MG
2.5 TABLET ORAL ONCE
Qty: 0 | Refills: 0 | Status: COMPLETED | OUTPATIENT
Start: 2019-01-18 | End: 2019-01-18

## 2019-01-18 RX ADMIN — INSULIN HUMAN 4: 100 INJECTION, SOLUTION SUBCUTANEOUS at 04:15

## 2019-01-18 RX ADMIN — AMIODARONE HYDROCHLORIDE 618 MILLIGRAM(S): 400 TABLET ORAL at 16:12

## 2019-01-18 RX ADMIN — HYDROMORPHONE HYDROCHLORIDE 0.25 MILLIGRAM(S): 2 INJECTION INTRAMUSCULAR; INTRAVENOUS; SUBCUTANEOUS at 10:35

## 2019-01-18 RX ADMIN — ONDANSETRON 4 MILLIGRAM(S): 8 TABLET, FILM COATED ORAL at 14:42

## 2019-01-18 RX ADMIN — HYDROMORPHONE HYDROCHLORIDE 0.25 MILLIGRAM(S): 2 INJECTION INTRAMUSCULAR; INTRAVENOUS; SUBCUTANEOUS at 21:25

## 2019-01-18 RX ADMIN — HYDROMORPHONE HYDROCHLORIDE 0.5 MILLIGRAM(S): 2 INJECTION INTRAMUSCULAR; INTRAVENOUS; SUBCUTANEOUS at 15:56

## 2019-01-18 RX ADMIN — HYDROMORPHONE HYDROCHLORIDE 0.5 MILLIGRAM(S): 2 INJECTION INTRAMUSCULAR; INTRAVENOUS; SUBCUTANEOUS at 16:48

## 2019-01-18 RX ADMIN — INSULIN HUMAN 4: 100 INJECTION, SOLUTION SUBCUTANEOUS at 09:34

## 2019-01-18 RX ADMIN — MORPHINE SULFATE 1 MILLIGRAM(S): 50 CAPSULE, EXTENDED RELEASE ORAL at 06:40

## 2019-01-18 RX ADMIN — HEPARIN SODIUM 5000 UNIT(S): 5000 INJECTION INTRAVENOUS; SUBCUTANEOUS at 14:40

## 2019-01-18 RX ADMIN — ONDANSETRON 4 MILLIGRAM(S): 8 TABLET, FILM COATED ORAL at 21:46

## 2019-01-18 RX ADMIN — INSULIN HUMAN 4: 100 INJECTION, SOLUTION SUBCUTANEOUS at 13:39

## 2019-01-18 RX ADMIN — INSULIN HUMAN 6: 100 INJECTION, SOLUTION SUBCUTANEOUS at 21:42

## 2019-01-18 RX ADMIN — HEPARIN SODIUM 5000 UNIT(S): 5000 INJECTION INTRAVENOUS; SUBCUTANEOUS at 21:42

## 2019-01-18 RX ADMIN — SODIUM CHLORIDE 750 MILLILITER(S): 9 INJECTION INTRAMUSCULAR; INTRAVENOUS; SUBCUTANEOUS at 08:00

## 2019-01-18 RX ADMIN — Medication 100 MILLIGRAM(S): at 05:48

## 2019-01-18 RX ADMIN — MORPHINE SULFATE 1 MILLIGRAM(S): 50 CAPSULE, EXTENDED RELEASE ORAL at 06:00

## 2019-01-18 RX ADMIN — HYDROMORPHONE HYDROCHLORIDE 0.25 MILLIGRAM(S): 2 INJECTION INTRAMUSCULAR; INTRAVENOUS; SUBCUTANEOUS at 21:40

## 2019-01-18 RX ADMIN — Medication 2.5 MILLIGRAM(S): at 07:50

## 2019-01-18 RX ADMIN — PANTOPRAZOLE SODIUM 40 MILLIGRAM(S): 20 TABLET, DELAYED RELEASE ORAL at 13:42

## 2019-01-18 RX ADMIN — SODIUM CHLORIDE 250 MILLILITER(S): 9 INJECTION INTRAMUSCULAR; INTRAVENOUS; SUBCUTANEOUS at 14:39

## 2019-01-18 RX ADMIN — INSULIN HUMAN 6: 100 INJECTION, SOLUTION SUBCUTANEOUS at 18:10

## 2019-01-18 RX ADMIN — AMIODARONE HYDROCHLORIDE 33.33 MG/MIN: 400 TABLET ORAL at 16:00

## 2019-01-18 RX ADMIN — HEPARIN SODIUM 5000 UNIT(S): 5000 INJECTION INTRAVENOUS; SUBCUTANEOUS at 05:49

## 2019-01-18 RX ADMIN — HYDROMORPHONE HYDROCHLORIDE 0.25 MILLIGRAM(S): 2 INJECTION INTRAMUSCULAR; INTRAVENOUS; SUBCUTANEOUS at 13:45

## 2019-01-18 RX ADMIN — Medication 25 MILLIGRAM(S): at 18:09

## 2019-01-18 RX ADMIN — INSULIN HUMAN 6: 100 INJECTION, SOLUTION SUBCUTANEOUS at 00:10

## 2019-01-18 RX ADMIN — Medication 2.5 MILLIGRAM(S): at 14:39

## 2019-01-18 RX ADMIN — ONDANSETRON 4 MILLIGRAM(S): 8 TABLET, FILM COATED ORAL at 06:10

## 2019-01-18 RX ADMIN — AMIODARONE HYDROCHLORIDE 16.67 MG/MIN: 400 TABLET ORAL at 16:11

## 2019-01-18 RX ADMIN — SODIUM CHLORIDE 500 MILLILITER(S): 9 INJECTION INTRAMUSCULAR; INTRAVENOUS; SUBCUTANEOUS at 21:47

## 2019-01-18 NOTE — PROGRESS NOTE ADULT - ASSESSMENT
ASSESSMENT:  73y Female w/ CHF EF 35, DM, chronic anemia (Hg ~8-9, gets procrit), active gastric cancer on chemo at [facility] Dr. Pratt, hx of ovarian ca/ s/p hysterectomy and oophorectomy 2 years ago presents for partial sbo 2/2 to  ventral hernia;  s/p repair w/ mesh and removal of adhesions    PLAN:   Neurologic: A+O, follows commands.  morphine q6, and diuladid prn   Respiratory: NC 2L sat at 96  Cardiovascular: Hx CHF (EF 35)- Home med: Benazepril 10mg and lasix;; post op hypotensive - 88/48 responded to fluid boluses.   Gastrointestinal/Nutrition: NPO, NG tube, +PPI  Renal/Genitourinary: Ureña, voided; intra op IVF 1800, post op 250cc given.  Hematologic: chronic anemia baseline 8-9-- procrit 40,000 unit qweekly- last 1/16  Infectious Disease: Tmax 100.9 intraop  Lines/Tubes: 1/17 a. line,  2 peripheral IV  Endocrine: DM- sliding scale, FS elevated --> tighten RISS.   Disposition: SICU care    --------------------------------------------------------------------------------------    Critical Care Diagnoses:artial sbo 2/2 to incarcerated ventral hernia;  s/p repair w/ mesh and removal of adhesions ASSESSMENT:  73y Female w/ CHF EF 35, DM, chronic anemia (Hg ~8-9, gets procrit), active gastric cancer on chemo at [facility] Dr. Pratt, hx of ovarian ca/ s/p hysterectomy and oophorectomy 2 years ago presents for partial sbo 2/2 to  ventral hernia;  s/p repair w/ mesh and removal of adhesions    PLAN:   Neurologic: A+O, follows commands, morphine prn pain  Respiratory: NC 2L sat at 96  Cardiovascular: Hx CHF (EF 35)- Home med: Benazepril 10mg and lasix;; post op hypotensive - 88/48 responded to fluid boluses.   Gastrointestinal/Nutrition: NPO, NG tube, +PPI  Renal/Genitourinary: Ureña, voided; intra op IVF 1800, post op 250cc given.  Hematologic: chronic anemia baseline 8-9-- procrit 40,000 unit qweekly- last 1/16  Infectious Disease: Tmax 100.9 intraop  Lines/Tubes: 1/17 a. line,  2 peripheral IV  Endocrine: DM- sliding scale, FS elevated --> tighten RISS.   Disposition: SICU care    --------------------------------------------------------------------------------------    Critical Care Diagnoses:artial sbo 2/2 to incarcerated ventral hernia;  s/p repair w/ mesh and removal of adhesions ASSESSMENT:  73y Female w/ CHF EF 35, DM, chronic anemia (Hg ~8-9, gets procrit), active gastric cancer on chemo at [facility] Dr. Pratt, hx of ovarian ca/ s/p hysterectomy and oophorectomy 2 years ago presents for partial sbo 2/2 to  ventral hernia;  s/p repair w/ mesh and removal of adhesions    PLAN:   Neurologic: A+O, follows commands, diuladid prn pain  Respiratory: NC 2L sat at 98, Spirometer  Cardiovascular: Hx CHF (EF 35)- Home med: Benazepril 10mg and lasix;; post op hypotensive - 88/48 responded to fluid boluses. metoprolol 2.5mg added since admitted.  Cardio-lara  Gastrointestinal/Nutrition: NPO except for ice chips and some sips of water, NG tube dc 1/18, +PPI  Renal/Genitourinary: Ureña, voided; intra op IVF 1800, post op 250cc given. Urin 30-80cc/hr; JULIÁN: 180cc  K=4.2, Cr 0.9  Hematologic: chronic anemia baseline 8-9-- procrit 40,000 unit qweekly- last 1/16.  Post op Hg 6.8 received 1 pRBC 1/18; Hg 8.4. Pt on Heparin subq.   Infectious Disease: Tmax 100.9 intraop-all other temps afebrile. pt received 2 doses of clindamycin. will be discontinued today.  Lines/Tubes: 1/17 a. line,  2 peripheral IV  Endocrine: DM- sliding scale, FS elevated --> tighten RISS.   Disposition: SICU care    --------------------------------------------------------------------------------------    Critical Care Diagnoses:artial sbo 2/2 to incarcerated ventral hernia;  s/p repair w/ mesh and removal of adhesions ASSESSMENT:  73y Female w/ CHF EF 35, DM, chronic anemia (Hg ~8-9, gets procrit), active gastric cancer on chemo at [facility] Dr. Pratt, hx of ovarian ca/ s/p hysterectomy and oophorectomy 2 years ago presents for partial sbo 2/2 to  ventral hernia;  s/p repair w/ mesh and removal of adhesions    PLAN:   Neurologic: A+O, follows commands, diuladid prn pain  Respiratory: NC 2L sat at 98, Spirometer  Cardiovascular: Hx CHF (EF 35)- Home med: Benazepril 10mg and lasix;; post op hypotensive - 88/48 responded to fluid boluses. metoprolol 2.5mg added since admitted.  Cardio-lara  Today 1/18 pt will be change to PO meds- Lininopril 10mg and Metoprolol 25mg bid with parameters.   Gastrointestinal/Nutrition: NPO except for ice chips and some sips of water, NG tube dc 1/18, +PPI  Renal/Genitourinary: Ureña, voided; intra op IVF 1800, post op 250cc given. Urin 30-80cc/hr; JULIÁN: 180cc  K=4.2, Cr 0.9  Hematologic: chronic anemia baseline 8-9-- procrit 40,000 unit qweekly- last 1/16.  Post op Hg 6.8 received 1 pRBC 1/18; Hg 8.4. Pt on Heparin subq.   Infectious Disease: Tmax 100.9 intraop-all other temps afebrile. pt received 2 doses of clindamycin. will be discontinued today.  Lines/Tubes: 1/17 a. line,  2 peripheral IV  Endocrine: DM- sliding scale, FS elevated --> tighten RISS.   Disposition: SICU care    --------------------------------------------------------------------------------------    Critical Care Diagnoses:artial sbo 2/2 to incarcerated ventral hernia;  s/p repair w/ mesh and removal of adhesions

## 2019-01-18 NOTE — PROGRESS NOTE ADULT - SUBJECTIVE AND OBJECTIVE BOX
GENERAL SURGERY PROGRESS NOTE     JILLIAN SEE  73y  Female  Hospital day :3d  POD:1  Procedure: Enterolysis  Repair of incarcerated ventral hernia with mesh    OVERNIGHT EVENTS: Patient's Hb was down to 6.8, received 1unit of PRBC overnight, repeat Hb- 8.4, moderate pain in the Left lowr quadrant. No flatus, no bowel movement yet. The bulb has an output of 140cc serosanguinous, 50ml billious drainage from the ng tube.    T(F): 98.6 (19 @ 06:00), Max: 98.8 (19 @ 01:45)  HR: 96 (19 @ 07:00) (74 - 109)  BP: 99/61 (19 @ 07:00) (88/48 - 134/94)  ABP: 123/52 (19 @ 07:00) (90/52 - 123/52)  RR: 20 (19 07:00) (15 - 24)  SpO2: 99% (19 07:00) (98% - 100%)    DIET/FLUIDS: dextrose 5%. 1000 milliLiter(s) IV Continuous <Continuous>  dextrose 5%. 1000 milliLiter(s) IV Continuous <Continuous>  sodium chloride 0.9% Bolus 250 milliLiter(s) IV Bolus once  sodium chloride 0.9%. 1000 milliLiter(s) IV Continuous <Continuous>    N19 @ 07:  -  19 @ 07:00  --------------------------------------------------------  OUT: 185 mL                                                                                 DRAINS:   19 @ 07:  -  19 @ 07:00  --------------------------------------------------------  OUT: 180 mL      BM:     EMESIS:     URINE:   19 @ 07:  -  01-18-19 @ 07:00  --------------------------------------------------------  OUT: 695 mL     Indwelling Urethral Catheter:     Connect To:  Straight Drainage/Gravity    Indication:  Urine Output Monitoring in Critically Ill (19 @ 17:17)    GI proph:  pantoprazole  Injectable 40 milliGRAM(s) IV Push daily    AC/ proph: heparin  Injectable 5000 Unit(s) SubCutaneous every 8 hours    ABx: clindamycin IVPB 900 milliGRAM(s) IV Intermittent every 8 hours      PHYSICAL EXAM:  GENERAL: NAD, well-appearing, nasal cannula insitu, ng tube insitu   CHEST/LUNG: Clear to auscultation bilaterally  HEART: s1s2+, occasional ectopics   ABDOMEN: Soft, tender at the llq, bulb insitu with serosangunious drainage, nondistended     LABS  Labs:  CAPILLARY BLOOD GLUCOSE      POCT Blood Glucose.: 206 mg/dL (2019 04:11)  POCT Blood Glucose.: 222 mg/dL (2019 01:29)  POCT Blood Glucose.: 234 mg/dL (2019 00:03)  POCT Blood Glucose.: 245 mg/dL (2019 19:58)  POCT Blood Glucose.: 201 mg/dL (2019 12:15)                          8.4    7.51  )-----------( 147      ( 2019 04:40 )             28.0       Auto Neutrophil %: 53.0 % (19 @ 18:30)  Auto Immature Granulocyte %: 0.5 % (19 @ 18:30)        137  |  100  |  16  ----------------------------<  223<H>  4.2   |  25  |  0.9      Calcium, Total Serum: 8.2 mg/dL (19 @ 23:35)      LFTs:             5.4  | 0.7  | 19       ------------------[70      ( 2019 18:30 )  3.3  | 0.4  | 14          Lipase:x      Amylase:x         Lactate, Blood: 1.1 mmol/L (19 @ 17:00)  Lactate, Blood: 2.0 mmol/L (01-15-19 @ 23:54)  Lactate, Blood: 3.2 mmol/L (01-15-19 @ 10:30)      Coags:     13.40  ----< 1.17    ( 2019 23:35 )     26.0        CARDIAC MARKERS ( 2019 04:40 )  x     / <0.01 ng/mL / 47 U/L / x     / 1.6 ng/mL  CARDIAC MARKERS ( 2019 23:35 )  x     / <0.01 ng/mL / x     / x     / 1.8 ng/mL  CARDIAC MARKERS ( 2019 18:30 )  x     / <0.01 ng/mL / 47 U/L / x     / 1.5 ng/mL              Culture - Urine (collected 15 Cortes 2019 10:30)  Source: .Urine Clean Catch (Midstream)  Final Report (2019 21:52):    No growth

## 2019-01-18 NOTE — PROGRESS NOTE ADULT - SUBJECTIVE AND OBJECTIVE BOX
JILLIAN SEE  [id number]  73y Female    Indication for ICU admission: high risk pt w/ partial sbo 2/2 to incarcerated ventral hernia;  s/p repair w/ mesh and removal of adhesions-- pt tacchy 126s and tmax 102.7 pre-operative.    Admit Date:01-15-19  ICU Date: 1/17/19  OR Date: 1/17/19    ciprofloxacin (Hives)  penicillin (Rash)    PAST MEDICAL & SURGICAL HISTORY:  Bronchitis  Diabetes mellitus, type 2    Home Medications:  benazepril 10 mg oral tablet: 1 tab(s) orally once a day (25 Jun 2018 18:49)  GlipiZIDE XL 10 mg oral tablet, extended release: 1 tab(s) orally once a day (25 Jun 2018 18:49)  Levemir FlexTouch:  (25 Jun 2018 18:49)  metFORMIN 1000 mg oral tablet: 1 tab(s) orally 2 times a day (25 Jun 2018 18:49)    OR time: 2hrs      EBL: 100         IV Fluids: 1800      Blood Products: none  UOP:  150        24HRS EVENT:    Neurologic: A+O, follows commands.  morphine q6, and diuladid prn     Respiratory: NC 2L sat at 96%  -PulmHTN no rx    Cardiovascular: Hx CHF (EF 35), hypo in PACU  -home med: Benazepril 10mg and lasix;; Now on Vasotec 0.625mg q6 and metoprolol tartrate 2.6mg q8; post op 88/98 responded to fluid.     Gastrointestinal/Nutrition: NPO, NG tube, +PPI    Renal/Genitourinary: Ureña, voided; intra op IVF 1800, post op 250cc given.    Hematologic: chronic anemia baseline 8-9-- procrit 40,000 unit qweekly- last 1/16  -SubQ Heparin  Overnight Hgb 6.8, transfuse one unit prbcs, CBC post ____    Infectious Disease: Tmax 100.9 intraop    Lines/Tubes: 1/17 a. line,  2 peripheral IV    Endocrine: DM- sliding scale.     Disposition: ICU admission for monitoring 24 hours then re-access.         DVT PTX: HepSubQ    GI PTX: Protonix    ***Tubes/Lines/Drains  ***  Peripheral IV  Arterial Line	Yes	                Date 1/17/19           Urinary Catheter		Indication: Strict I&O    Date Placed:       REVIEW OF SYSTEMS    [ X] A ten-point review of systems was otherwise negative except as noted.  [ ] Due to altered mental status/intubation, subjective information were not able to be obtained from the patient. History was obtained, to the extent possible, from review of the chart and collateral sources of information. JILLIAN SEE  [id number]  73y Female    Indication for ICU admission: high risk pt w/ partial sbo 2/2 to incarcerated ventral hernia;  s/p repair w/ mesh and removal of adhesions-- pt tacchy 126s and tmax 102.7 pre-operative.    Admit Date:01-15-19  ICU Date: 1/17/19  OR Date: 1/17/19    ciprofloxacin (Hives)  penicillin (Rash)    PAST MEDICAL & SURGICAL HISTORY:  Bronchitis  Diabetes mellitus, type 2    Home Medications:  benazepril 10 mg oral tablet: 1 tab(s) orally once a day (25 Jun 2018 18:49)  GlipiZIDE XL 10 mg oral tablet, extended release: 1 tab(s) orally once a day (25 Jun 2018 18:49)  Levemir FlexTouch:  (25 Jun 2018 18:49)  metFORMIN 1000 mg oral tablet: 1 tab(s) orally 2 times a day (25 Jun 2018 18:49)    OR time: 2hrs      EBL: 100         IV Fluids: 1800      Blood Products: none  UOP:  150        24HRS EVENT:    Neurologic: A+O, follows commands.  morphine q6, and diuladid prn     Respiratory: NC 2L sat at 96%  -PulmHTN no rx    Cardiovascular: Hx CHF (EF 35), hypo in PACU  -home med: Benazepril 10mg and lasix;; Now on Vasotec 0.625mg q6 and metoprolol tartrate 2.6mg q8; post op 88/48 responded to fluid.     Gastrointestinal/Nutrition: NPO, NG tube, +PPI    Renal/Genitourinary: Ureña, voided; intra op IVF 1800, post op 250cc given.    Hematologic: chronic anemia baseline 8-9-- procrit 40,000 unit qweekly- last 1/16  -SubQ Heparin  Overnight Hgb 6.8, transfuse one unit prbcs, CBC post ____    Infectious Disease: Tmax 100.9 intraop    Lines/Tubes: 1/17 a. line,  2 peripheral IV    Endocrine: DM- sliding scale.     Disposition: ICU admission for monitoring 24 hours then re-access.         DVT PTX: HepSubQ    GI PTX: Protonix    ***Tubes/Lines/Drains  ***  Peripheral IV  Arterial Line	Yes	                Date 1/17/19           Urinary Catheter		Indication: Strict I&O    Date Placed:       REVIEW OF SYSTEMS    [ X] A ten-point review of systems was otherwise negative except as noted.  [ ] Due to altered mental status/intubation, subjective information were not able to be obtained from the patient. History was obtained, to the extent possible, from review of the chart and collateral sources of information. JILLIAN SEE  [id number]  73y Female    Indication for ICU admission: high risk pt w/ partial sbo 2/2 to incarcerated ventral hernia;  s/p repair w/ mesh and removal of adhesions-- pt tacchy 126s and tmax 102.7 pre-operative.    Admit Date:01-15-19  ICU Date: 1/17/19  OR Date: 1/17/19    ciprofloxacin (Hives)  penicillin (Rash)    PAST MEDICAL & SURGICAL HISTORY:  Bronchitis  Diabetes mellitus, type 2    Home Medications:  benazepril 10 mg oral tablet: 1 tab(s) orally once a day (25 Jun 2018 18:49)  GlipiZIDE XL 10 mg oral tablet, extended release: 1 tab(s) orally once a day (25 Jun 2018 18:49)  Levemir FlexTouch:  (25 Jun 2018 18:49)  metFORMIN 1000 mg oral tablet: 1 tab(s) orally 2 times a day (25 Jun 2018 18:49)    OR time: 2hrs      EBL: 100         IV Fluids: 1800      Blood Products: none  UOP:  150        24HRS EVENT:    Neurologic: A+O, follows commands.  morphine q6, and diuladid prn     Respiratory: NC 2L sat at 96%, +spirometer.  -PulmHTN no rx    Cardiovascular: Hx CHF (EF 35), hypo in PACU  -home med: Benazepril 10mg and lasix;; Now on Vasotec 0.625mg q6 and metoprolol tartrate 2.6mg q8; post op 88/48 responded to 250ccbolus+ then 2(250cc)overnight a total of 750cc since OR.    Gastrointestinal/Nutrition: NPO, +ice chips, NG tube removed, +PPI    Renal/Genitourinary: Ureña, voided; intra op IVF 1800, post op 750cc given.     Hematologic: chronic anemia baseline 8-9-- procrit 40,000 unit qweekly- last 1/16  -SubQ Heparin  Overnight Hgb 6.8, transfuse one unit prbcs, CBC post=8.4 ____    Infectious Disease: Tmax 100.9 intraop; has received 2 doses of clindamycin will discuss. Has not been febrile since episode.    Lines/Tubes: 1/17 a. line,  2 peripheral IV    Endocrine: DM- sliding scale.     Disposition: ICU admission for monitoring 24 hours then re-access.         DVT PTX: HepSubQ    GI PTX: Protonix    ***Tubes/Lines/Drains  ***  Peripheral IV  Arterial Line	Yes	                Date 1/17/19           Urinary Catheter		Indication: Strict I&O    Date Placed:       REVIEW OF SYSTEMS    [ X] A ten-point review of systems was otherwise negative except as noted.  [ ] Due to altered mental status/intubation, subjective information were not able to be obtained from the patient. History was obtained, to the extent possible, from review of the chart and collateral sources of information.            Daily     Daily     Diet, NPO after Midnight:      NPO Start Date: 16-Jan-2019,   NPO Start Time: 23:59 (01-17-19 @ 17:17)      CURRENT MEDS:  Neurologic Medications  morphine  - Injectable 1 milliGRAM(s) IV Push every 6 hours  ondansetron Injectable 4 milliGRAM(s) IV Push every 8 hours    Respiratory Medications    Cardiovascular Medications  metoprolol tartrate Injectable 2.5 milliGRAM(s) IV Push every 8 hours    Gastrointestinal Medications  dextrose 5%. 1000 milliLiter(s) IV Continuous <Continuous>  dextrose 5%. 1000 milliLiter(s) IV Continuous <Continuous>  pantoprazole  Injectable 40 milliGRAM(s) IV Push daily  sodium chloride 0.9%. 1000 milliLiter(s) IV Continuous <Continuous>    Genitourinary Medications    Hematologic/Oncologic Medications  heparin  Injectable 5000 Unit(s) SubCutaneous every 8 hours    Antimicrobial/Immunologic Medications  clindamycin IVPB 900 milliGRAM(s) IV Intermittent every 8 hours    Endocrine/Metabolic Medications  dextrose 50% Injectable 25 Gram(s) IV Push once  dextrose 50% Injectable 25 Gram(s) IV Push once  insulin regular  human corrective regimen sliding scale   IV Push every 4 hours    Topical/Other Medications  chlorhexidine 4% Liquid 1 Application(s) Topical <User Schedule>      ICU Vital Signs Last 24 Hrs  T(C): 37.3 (18 Jan 2019 08:00), Max: 37.3 (18 Jan 2019 08:00)  T(F): 99.2 (18 Jan 2019 08:00), Max: 99.2 (18 Jan 2019 08:00)  HR: 98 (18 Jan 2019 08:00) (74 - 109)  BP: 101/78 (18 Jan 2019 08:00) (88/48 - 134/94)  BP(mean): --  ABP: 106/48 (18 Jan 2019 08:00) (90/52 - 123/52)  ABP(mean): --  RR: 24 (18 Jan 2019 08:00) (15 - 24)  SpO2: 99% (18 Jan 2019 08:00) (98% - 100%)      Adult Advanced Hemodynamics Last 24 Hrs  CVP(mm Hg): --  CVP(cm H2O): --  CO: --  CI: --  PA: --  PA(mean): --  PCWP: --  SVR: --  SVRI: --  PVR: --  PVRI: --          I&O's Summary    17 Jan 2019 07:01  -  18 Jan 2019 07:00  --------------------------------------------------------  IN: 2247 mL / OUT: 1060 mL / NET: 1187 mL    18 Jan 2019 07:01  -  18 Jan 2019 09:21  --------------------------------------------------------  IN: 450 mL / OUT: 34 mL / NET: 416 mL      I&O's Detail    17 Jan 2019 07:01  -  18 Jan 2019 07:00  --------------------------------------------------------  IN:    IV PiggyBack: 150 mL    Lactated Ringers IV Bolus: 500 mL    lactated ringers.: 500 mL    Packed Red Blood Cells: 297 mL    sodium chloride 0.9%.: 800 mL  Total IN: 2247 mL    OUT:    Bulb: 180 mL    Indwelling Catheter - Urethral: 695 mL    Nasoenteral Tube: 185 mL  Total OUT: 1060 mL    Total NET: 1187 mL      18 Jan 2019 07:01  -  18 Jan 2019 09:21  --------------------------------------------------------  IN:    Sodium Chloride 0.9% IV Bolus: 250 mL    sodium chloride 0.9%.: 200 mL  Total IN: 450 mL    OUT:    Indwelling Catheter - Urethral: 34 mL  Total OUT: 34 mL    Total NET: 416 mL          PHYSICAL EXAM:    General/Neuro  RASS:             GCS:     =15  Deficits:                             alert & oriented x 3, no focal deficits  Pupils: PERRLA    Lungs:      clear to auscultation, Normal expansion/effort.     Cardiovascular : S1, S2.  Regular rate and rhythm.  Peripheral edema   Cardiac Rhythm: Normal Sinus Rhythm    GI: Abdomen soft, mild tenderness around wound LLQ, Non-distended.  bulb insitu with serosangunious drainage  Wound: dry dressing on vertical wound.     Extremities: Extremities warm, pink, well-perfused. Pulses:Rt     Lt radial and dorsalis pedis 2+b/l    Derm: Good skin turgor, no skin breakdown.      :       Ureña catheter in place.      CXR:       LABS:  CAPILLARY BLOOD GLUCOSE      POCT Blood Glucose.: 206 mg/dL (18 Jan 2019 04:11)  POCT Blood Glucose.: 222 mg/dL (18 Jan 2019 01:29)  POCT Blood Glucose.: 234 mg/dL (18 Jan 2019 00:03)  POCT Blood Glucose.: 245 mg/dL (17 Jan 2019 19:58)  POCT Blood Glucose.: 201 mg/dL (17 Jan 2019 12:15)                          8.4    7.51  )-----------( 147      ( 18 Jan 2019 04:40 )             28.0       01-17    137  |  100  |  16  ----------------------------<  223<H>  4.2   |  25  |  0.9    Ca    8.2<L>      17 Jan 2019 23:35  Phos  3.6     01-17  Mg     2.5     01-17    TPro  5.4<L>  /  Alb  3.3<L>  /  TBili  0.7  /  DBili  0.4<H>  /  AST  19  /  ALT  14  /  AlkPhos  70  01-17      PT/INR - ( 17 Jan 2019 23:35 )   PT: 13.40 sec;   INR: 1.17 ratio         PTT - ( 17 Jan 2019 23:35 )  PTT:26.0 sec  CARDIAC MARKERS ( 18 Jan 2019 04:40 )  x     / <0.01 ng/mL / 47 U/L / x     / 1.6 ng/mL  CARDIAC MARKERS ( 17 Jan 2019 23:35 )  x     / <0.01 ng/mL / x     / x     / 1.8 ng/mL  CARDIAC MARKERS ( 17 Jan 2019 18:30 )  x     / <0.01 ng/mL / 47 U/L / x     / 1.5 ng/mL          Culture - Urine (collected 15 Cortes 2019 10:30)  Source: .Urine Clean Catch (Midstream)  Final Report (16 Jan 2019 21:52):    No growth JILLIAN SEE  [id number]  73y Female    Indication for ICU admission: high risk pt w/ partial sbo 2/2 to incarcerated ventral hernia;  s/p repair w/ mesh and removal of adhesions-- pt tacchy 126s and tmax 102.7 pre-operative.    Admit Date:01-15-19  ICU Date: 1/17/19  OR Date: 1/17/19    ciprofloxacin (Hives)  penicillin (Rash)    PAST MEDICAL & SURGICAL HISTORY:  Bronchitis  Diabetes mellitus, type 2    Home Medications:  benazepril 10 mg oral tablet: 1 tab(s) orally once a day (25 Jun 2018 18:49)  GlipiZIDE XL 10 mg oral tablet, extended release: 1 tab(s) orally once a day (25 Jun 2018 18:49)  Levemir FlexTouch:  (25 Jun 2018 18:49)  metFORMIN 1000 mg oral tablet: 1 tab(s) orally 2 times a day (25 Jun 2018 18:49)    OR time: 2hrs      EBL: 100         IV Fluids: 1800      Blood Products: none  UOP:  150        24HRS EVENT:    Neurologic: A+O, follows commands.  morphine q6, and diuladid prn     Respiratory: NC 2L sat at 96%, +spirometer.  -PulmHTN no rx    Cardiovascular: Hx CHF (EF 35), hypo in PACU  -home med: Benazepril 10mg and lasix;; Now on Vasotec 0.625mg q6 and metoprolol tartrate 2.6mg q8; post op 88/48 responded to 250ccbolus+ then 2(250cc)overnight a total of 750cc since OR.    Gastrointestinal/Nutrition: NPO, +ice chips, NG tube removed, +PPI    Renal/Genitourinary: Ureña, voided; intra op IVF 1800, post op 750cc given.     Hematologic: chronic anemia baseline 8-9-- procrit 40,000 unit qweekly- last 1/16  -SubQ Heparin  Overnight Hgb 6.8, transfuse one unit prbcs, CBC post=8.4 ____    Infectious Disease: Tmax 100.9 intraop; has received 2 doses of clindamycin will discuss. Has not been febrile since episode.    Lines/Tubes: 1/17 a. line,  2 peripheral IV    Endocrine: DM- sliding scale.     Disposition: ICU admission for monitoring 24 hours then re-access.         DVT PTX: HepSubQ    GI PTX: Protonix    ***Tubes/Lines/Drains  ***  Peripheral IV  Arterial Line	Yes	                Date 1/17/19           Urinary Catheter		Indication: Strict I&O    Date Placed:       REVIEW OF SYSTEMS    [ X] A ten-point review of systems was otherwise negative except as noted.  [ ] Due to altered mental status/intubation, subjective information were not able to be obtained from the patient. History was obtained, to the extent possible, from review of the chart and collateral sources of information.            Daily     Daily     Diet, NPO after Midnight:      NPO Start Date: 16-Jan-2019,   NPO Start Time: 23:59 (01-17-19 @ 17:17)      CURRENT MEDS:  Neurologic Medications  morphine  - Injectable 1 milliGRAM(s) IV Push every 6 hours  ondansetron Injectable 4 milliGRAM(s) IV Push every 8 hours    Respiratory Medications    Cardiovascular Medications  metoprolol tartrate Injectable 2.5 milliGRAM(s) IV Push every 8 hours    Gastrointestinal Medications  dextrose 5%. 1000 milliLiter(s) IV Continuous <Continuous>  dextrose 5%. 1000 milliLiter(s) IV Continuous <Continuous>  pantoprazole  Injectable 40 milliGRAM(s) IV Push daily  sodium chloride 0.9%. 1000 milliLiter(s) IV Continuous <Continuous>    Genitourinary Medications    Hematologic/Oncologic Medications  heparin  Injectable 5000 Unit(s) SubCutaneous every 8 hours    Antimicrobial/Immunologic Medications  clindamycin IVPB 900 milliGRAM(s) IV Intermittent every 8 hours    Endocrine/Metabolic Medications  dextrose 50% Injectable 25 Gram(s) IV Push once  dextrose 50% Injectable 25 Gram(s) IV Push once  insulin regular  human corrective regimen sliding scale   IV Push every 4 hours    Topical/Other Medications  chlorhexidine 4% Liquid 1 Application(s) Topical <User Schedule>      ICU Vital Signs Last 24 Hrs  T(C): 37.3 (18 Jan 2019 08:00), Max: 37.3 (18 Jan 2019 08:00)  T(F): 99.2 (18 Jan 2019 08:00), Max: 99.2 (18 Jan 2019 08:00)  HR: 98 (18 Jan 2019 08:00) (74 - 109)  BP: 101/78 (18 Jan 2019 08:00) (88/48 - 134/94)  BP(mean): --  ABP: 106/48 (18 Jan 2019 08:00) (90/52 - 123/52)  ABP(mean): --  RR: 24 (18 Jan 2019 08:00) (15 - 24)  SpO2: 99% (18 Jan 2019 08:00) (98% - 100%)            I&O's Summary    17 Jan 2019 07:01  -  18 Jan 2019 07:00  --------------------------------------------------------  IN: 2247 mL / OUT: 1060 mL / NET: 1187 mL    18 Jan 2019 07:01  -  18 Jan 2019 09:21  --------------------------------------------------------  IN: 450 mL / OUT: 34 mL / NET: 416 mL      I&O's Detail    17 Jan 2019 07:01  -  18 Jan 2019 07:00  --------------------------------------------------------  IN:    IV PiggyBack: 150 mL    Lactated Ringers IV Bolus: 500 mL    lactated ringers.: 500 mL    Packed Red Blood Cells: 297 mL    sodium chloride 0.9%.: 800 mL  Total IN: 2247 mL    OUT:    Bulb: 180 mL    Indwelling Catheter - Urethral: 695 mL    Nasoenteral Tube: 185 mL  Total OUT: 1060 mL    Total NET: 1187 mL      18 Jan 2019 07:01  -  18 Jan 2019 09:21  --------------------------------------------------------  IN:    Sodium Chloride 0.9% IV Bolus: 250 mL    sodium chloride 0.9%.: 200 mL  Total IN: 450 mL    OUT:    Indwelling Catheter - Urethral: 34 mL  Total OUT: 34 mL    Total NET: 416 mL          PHYSICAL EXAM:    General/Neuro  RASS:             GCS:     =15  Deficits:                             alert & oriented x 3, no focal deficits  Pupils: PERRLA    Lungs:      clear to auscultation, Normal expansion/effort.     Cardiovascular : S1, S2.  Regular rate and rhythm.  Peripheral edema   Cardiac Rhythm: Normal Sinus Rhythm    GI: Abdomen soft, mild tenderness around wound LLQ, Non-distended.  bulb insitu with serosangunious drainage  Wound: dry dressing on vertical wound.     Extremities: Extremities warm, pink, well-perfused. Pulses:Rt     Lt radial and dorsalis pedis 2+b/l    Derm: Good skin turgor, no skin breakdown.      :       Ureña catheter in place.      CXR:       LABS:  CAPILLARY BLOOD GLUCOSE      POCT Blood Glucose.: 206 mg/dL (18 Jan 2019 04:11)  POCT Blood Glucose.: 222 mg/dL (18 Jan 2019 01:29)  POCT Blood Glucose.: 234 mg/dL (18 Jan 2019 00:03)  POCT Blood Glucose.: 245 mg/dL (17 Jan 2019 19:58)  POCT Blood Glucose.: 201 mg/dL (17 Jan 2019 12:15)                          8.4    7.51  )-----------( 147      ( 18 Jan 2019 04:40 )             28.0       01-17    137  |  100  |  16  ----------------------------<  223<H>  4.2   |  25  |  0.9    Ca    8.2<L>      17 Jan 2019 23:35  Phos  3.6     01-17  Mg     2.5     01-17    TPro  5.4<L>  /  Alb  3.3<L>  /  TBili  0.7  /  DBili  0.4<H>  /  AST  19  /  ALT  14  /  AlkPhos  70  01-17      PT/INR - ( 17 Jan 2019 23:35 )   PT: 13.40 sec;   INR: 1.17 ratio         PTT - ( 17 Jan 2019 23:35 )  PTT:26.0 sec  CARDIAC MARKERS ( 18 Jan 2019 04:40 )  x     / <0.01 ng/mL / 47 U/L / x     / 1.6 ng/mL  CARDIAC MARKERS ( 17 Jan 2019 23:35 )  x     / <0.01 ng/mL / x     / x     / 1.8 ng/mL  CARDIAC MARKERS ( 17 Jan 2019 18:30 )  x     / <0.01 ng/mL / 47 U/L / x     / 1.5 ng/mL          Culture - Urine (collected 15 Cortes 2019 10:30)  Source: .Urine Clean Catch (Midstream)  Final Report (16 Jan 2019 21:52):    No growth JILLIAN SEE  [id number]  73y Female    Indication for ICU admission: high risk pt w/ partial sbo 2/2 to incarcerated ventral hernia;  s/p repair w/ mesh and removal of adhesions-- pt tacchy 126s and tmax 102.7 pre-operative.    Admit Date:01-15-19  ICU Date: 1/17/19  OR Date: 1/17/19    ciprofloxacin (Hives)  penicillin (Rash)    PAST MEDICAL & SURGICAL HISTORY:  Bronchitis  Diabetes mellitus, type 2    Home Medications:  benazepril 10 mg oral tablet: 1 tab(s) orally once a day (25 Jun 2018 18:49)  GlipiZIDE XL 10 mg oral tablet, extended release: 1 tab(s) orally once a day (25 Jun 2018 18:49)  Levemir FlexTouch:  (25 Jun 2018 18:49)  metFORMIN 1000 mg oral tablet: 1 tab(s) orally 2 times a day (25 Jun 2018 18:49)    OR time: 2hrs      EBL: 100         IV Fluids: 1800      Blood Products: none  UOP:  150        24HRS EVENT:    Neurologic: A+O, follows commands.  morphine q6, and diuladid prn     Respiratory: NC 2L sat at 96%, +spirometer.  -PulmHTN no rx    Cardiovascular: Hx CHF (EF 35), hypo in PACU  -home med: Benazepril 10mg and lasix;; Now on Vasotec 0.625mg q6 and metoprolol tartrate 2.6mg q8; post op 88/48 responded to 250ccbolus+ then 2(250cc)overnight a total of 750cc since OR.    Gastrointestinal/Nutrition: NPO, +ice chips, NG tube removed, +PPI    Renal/Genitourinary: Ureña, voided; intra op IVF 1800, post op 750cc given.     Hematologic: chronic anemia baseline 8-9-- procrit 40,000 unit qweekly- last 1/16  -SubQ Heparin  Overnight Hgb 6.8, transfuse one unit prbcs, CBC post=8.4 ____    Infectious Disease: Tmax 100.9 intraop; has received 2 doses of clindamycin will discuss. Has not been febrile since episode.    Lines/Tubes: 1/17 a. line,  2 peripheral IV    Endocrine: DM- sliding scale.     Disposition: ICU admission for monitoring 24 hours then re-access.         DVT PTX: HepSubQ    GI PTX: Protonix    ***Tubes/Lines/Drains  ***  Peripheral IV  Arterial Line	Yes	                Date 1/17/19           Urinary Catheter		Indication: Strict I&O    Date Placed: 1/17/19      REVIEW OF SYSTEMS    [ X] A ten-point review of systems was otherwise negative except as noted.  [ ] Due to altered mental status/intubation, subjective information were not able to be obtained from the patient. History was obtained, to the extent possible, from review of the chart and collateral sources of information.            Daily     Daily     Diet, NPO after Midnight:      NPO Start Date: 16-Jan-2019,   NPO Start Time: 23:59 (01-17-19 @ 17:17)      CURRENT MEDS:  Neurologic Medications  morphine  - Injectable 1 milliGRAM(s) IV Push every 6 hours  ondansetron Injectable 4 milliGRAM(s) IV Push every 8 hours    Respiratory Medications    Cardiovascular Medications  metoprolol tartrate Injectable 2.5 milliGRAM(s) IV Push every 8 hours    Gastrointestinal Medications  dextrose 5%. 1000 milliLiter(s) IV Continuous <Continuous>  dextrose 5%. 1000 milliLiter(s) IV Continuous <Continuous>  pantoprazole  Injectable 40 milliGRAM(s) IV Push daily  sodium chloride 0.9%. 1000 milliLiter(s) IV Continuous <Continuous>    Genitourinary Medications    Hematologic/Oncologic Medications  heparin  Injectable 5000 Unit(s) SubCutaneous every 8 hours    Antimicrobial/Immunologic Medications  clindamycin IVPB 900 milliGRAM(s) IV Intermittent every 8 hours    Endocrine/Metabolic Medications  dextrose 50% Injectable 25 Gram(s) IV Push once  dextrose 50% Injectable 25 Gram(s) IV Push once  insulin regular  human corrective regimen sliding scale   IV Push every 4 hours    Topical/Other Medications  chlorhexidine 4% Liquid 1 Application(s) Topical <User Schedule>      ICU Vital Signs Last 24 Hrs  T(C): 37.3 (18 Jan 2019 08:00), Max: 37.3 (18 Jan 2019 08:00)  T(F): 99.2 (18 Jan 2019 08:00), Max: 99.2 (18 Jan 2019 08:00)  HR: 98 (18 Jan 2019 08:00) (74 - 109)  BP: 101/78 (18 Jan 2019 08:00) (88/48 - 134/94)  BP(mean): --  ABP: 106/48 (18 Jan 2019 08:00) (90/52 - 123/52)  ABP(mean): --  RR: 24 (18 Jan 2019 08:00) (15 - 24)  SpO2: 99% (18 Jan 2019 08:00) (98% - 100%)            I&O's Summary    17 Jan 2019 07:01  -  18 Jan 2019 07:00  --------------------------------------------------------  IN: 2247 mL / OUT: 1060 mL / NET: 1187 mL    18 Jan 2019 07:01  -  18 Jan 2019 09:21  --------------------------------------------------------  IN: 450 mL / OUT: 34 mL / NET: 416 mL      I&O's Detail    17 Jan 2019 07:01  -  18 Jan 2019 07:00  --------------------------------------------------------  IN:    IV PiggyBack: 150 mL    Lactated Ringers IV Bolus: 500 mL    lactated ringers.: 500 mL    Packed Red Blood Cells: 297 mL    sodium chloride 0.9%.: 800 mL  Total IN: 2247 mL    OUT:    Bulb: 180 mL    Indwelling Catheter - Urethral: 695 mL    Nasoenteral Tube: 185 mL  Total OUT: 1060 mL    Total NET: 1187 mL      18 Jan 2019 07:01  -  18 Jan 2019 09:21  --------------------------------------------------------  IN:    Sodium Chloride 0.9% IV Bolus: 250 mL    sodium chloride 0.9%.: 200 mL  Total IN: 450 mL    OUT:    Indwelling Catheter - Urethral: 34 mL  Total OUT: 34 mL    Total NET: 416 mL          PHYSICAL EXAM:    General/Neuro  RASS:             GCS:     =15  Deficits:                             alert & oriented x 3, no focal deficits  Pupils: PERRLA    Lungs:      clear to auscultation, Normal expansion/effort.     Cardiovascular : S1, S2.  Regular rate and rhythm.  Peripheral edema   Cardiac Rhythm: Normal Sinus Rhythm    GI: Abdomen soft, mild tenderness around wound LLQ, Non-distended.  bulb insitu with serosangunious drainage  Wound: dry dressing on vertical wound.     Extremities: Extremities warm, pink, well-perfused. Pulses:Rt     Lt radial and dorsalis pedis 2+b/l    Derm: Good skin turgor, no skin breakdown.      :       Ureña catheter in place.      CXR:       LABS:  CAPILLARY BLOOD GLUCOSE      POCT Blood Glucose.: 206 mg/dL (18 Jan 2019 04:11)  POCT Blood Glucose.: 222 mg/dL (18 Jan 2019 01:29)  POCT Blood Glucose.: 234 mg/dL (18 Jan 2019 00:03)  POCT Blood Glucose.: 245 mg/dL (17 Jan 2019 19:58)  POCT Blood Glucose.: 201 mg/dL (17 Jan 2019 12:15)                          8.4    7.51  )-----------( 147      ( 18 Jan 2019 04:40 )             28.0       01-17    137  |  100  |  16  ----------------------------<  223<H>  4.2   |  25  |  0.9    Ca    8.2<L>      17 Jan 2019 23:35  Phos  3.6     01-17  Mg     2.5     01-17    TPro  5.4<L>  /  Alb  3.3<L>  /  TBili  0.7  /  DBili  0.4<H>  /  AST  19  /  ALT  14  /  AlkPhos  70  01-17      PT/INR - ( 17 Jan 2019 23:35 )   PT: 13.40 sec;   INR: 1.17 ratio         PTT - ( 17 Jan 2019 23:35 )  PTT:26.0 sec  CARDIAC MARKERS ( 18 Jan 2019 04:40 )  x     / <0.01 ng/mL / 47 U/L / x     / 1.6 ng/mL  CARDIAC MARKERS ( 17 Jan 2019 23:35 )  x     / <0.01 ng/mL / x     / x     / 1.8 ng/mL  CARDIAC MARKERS ( 17 Jan 2019 18:30 )  x     / <0.01 ng/mL / 47 U/L / x     / 1.5 ng/mL          Culture - Urine (collected 15 [address] 2019 10:30)  Source: .Urine Clean Catch (Midstream)  Final Report (16 Jan 2019 21:52):    No growth

## 2019-01-18 NOTE — PROGRESS NOTE ADULT - SUBJECTIVE AND OBJECTIVE BOX
SUBJ:No chest pain or shortness of breath      MEDICATIONS  (STANDING):  amiodarone Infusion 1 mG/Min (33.333 mL/Hr) IV Continuous <Continuous>  amiodarone Infusion 0.5 mG/Min (16.667 mL/Hr) IV Continuous <Continuous>  amiodarone IVPB 150 milliGRAM(s) IV Intermittent once  chlorhexidine 4% Liquid 1 Application(s) Topical <User Schedule>  dextrose 5%. 1000 milliLiter(s) (50 mL/Hr) IV Continuous <Continuous>  dextrose 5%. 1000 milliLiter(s) (50 mL/Hr) IV Continuous <Continuous>  dextrose 50% Injectable 25 Gram(s) IV Push once  dextrose 50% Injectable 25 Gram(s) IV Push once  heparin  Injectable 5000 Unit(s) SubCutaneous every 8 hours  insulin regular  human corrective regimen sliding scale   IV Push every 4 hours  metoprolol tartrate 25 milliGRAM(s) Oral two times a day  ondansetron Injectable 4 milliGRAM(s) IV Push every 8 hours  pantoprazole  Injectable 40 milliGRAM(s) IV Push daily  sodium chloride 0.9%. 1000 milliLiter(s) (100 mL/Hr) IV Continuous <Continuous>    MEDICATIONS  (PRN):  HYDROmorphone  Injectable 0.25 milliGRAM(s) IV Push every 4 hours PRN Moderate Pain (4 - 6)  HYDROmorphone  Injectable 0.5 milliGRAM(s) IV Push every 4 hours PRN Severe Pain (7 - 10)            Vital Signs Last 24 Hrs  T(C): 36.8 (18 Jan 2019 16:17), Max: 37.9 (18 Jan 2019 11:00)  T(F): 98.2 (18 Jan 2019 16:17), Max: 100.3 (18 Jan 2019 11:00)  HR: 93 (18 Jan 2019 16:17) (74 - 133)  BP: 110/62 (18 Jan 2019 16:17) (88/48 - 132/63)  BP(mean): --  RR: 19 (18 Jan 2019 16:17) (15 - 24)  SpO2: 98% (18 Jan 2019 16:17) (94% - 100%)      ECG:NML    TTE:    LABS:                        8.2    8.43  )-----------( 152      ( 18 Jan 2019 11:00 )             27.5     01-17    137  |  100  |  16  ----------------------------<  223<H>  4.2   |  25  |  0.9    Ca    8.2<L>      17 Jan 2019 23:35  Phos  3.6     01-17  Mg     2.5     01-17    TPro  5.4<L>  /  Alb  3.3<L>  /  TBili  0.7  /  DBili  0.4<H>  /  AST  19  /  ALT  14  /  AlkPhos  70  01-17    CARDIAC MARKERS ( 18 Jan 2019 04:40 )  x     / <0.01 ng/mL / 47 U/L / x     / 1.6 ng/mL  CARDIAC MARKERS ( 17 Jan 2019 23:35 )  x     / <0.01 ng/mL / x     / x     / 1.8 ng/mL  CARDIAC MARKERS ( 17 Jan 2019 18:30 )  x     / <0.01 ng/mL / 47 U/L / x     / 1.5 ng/mL      PT/INR - ( 17 Jan 2019 23:35 )   PT: 13.40 sec;   INR: 1.17 ratio         PTT - ( 17 Jan 2019 23:35 )  PTT:26.0 sec    I&O's Summary    17 Jan 2019 07:01  -  18 Jan 2019 07:00  --------------------------------------------------------  IN: 2247 mL / OUT: 1060 mL / NET: 1187 mL    18 Jan 2019 07:01  -  18 Jan 2019 16:45  --------------------------------------------------------  IN: 1305 mL / OUT: 315 mL / NET: 990 mL      BNP

## 2019-01-19 LAB
BLD GP AB SCN SERPL QL: SIGNIFICANT CHANGE UP
GLUCOSE BLDC GLUCOMTR-MCNC: 116 MG/DL — HIGH (ref 70–99)
GLUCOSE BLDC GLUCOMTR-MCNC: 128 MG/DL — HIGH (ref 70–99)
GLUCOSE BLDC GLUCOMTR-MCNC: 137 MG/DL — HIGH (ref 70–99)
GLUCOSE BLDC GLUCOMTR-MCNC: 158 MG/DL — HIGH (ref 70–99)
GLUCOSE BLDC GLUCOMTR-MCNC: 162 MG/DL — HIGH (ref 70–99)
GLUCOSE BLDC GLUCOMTR-MCNC: 162 MG/DL — HIGH (ref 70–99)
GLUCOSE BLDC GLUCOMTR-MCNC: 167 MG/DL — HIGH (ref 70–99)
GLUCOSE BLDC GLUCOMTR-MCNC: 181 MG/DL — HIGH (ref 70–99)
GLUCOSE BLDC GLUCOMTR-MCNC: 259 MG/DL — HIGH (ref 70–99)
GLUCOSE BLDC GLUCOMTR-MCNC: 263 MG/DL — HIGH (ref 70–99)
GLUCOSE BLDC GLUCOMTR-MCNC: 265 MG/DL — HIGH (ref 70–99)
GLUCOSE BLDC GLUCOMTR-MCNC: 274 MG/DL — HIGH (ref 70–99)
GLUCOSE BLDC GLUCOMTR-MCNC: 274 MG/DL — HIGH (ref 70–99)
GLUCOSE BLDC GLUCOMTR-MCNC: 293 MG/DL — HIGH (ref 70–99)
GLUCOSE BLDC GLUCOMTR-MCNC: 330 MG/DL — HIGH (ref 70–99)
TYPE + AB SCN PNL BLD: SIGNIFICANT CHANGE UP

## 2019-01-19 PROCEDURE — 99233 SBSQ HOSP IP/OBS HIGH 50: CPT

## 2019-01-19 PROCEDURE — 99232 SBSQ HOSP IP/OBS MODERATE 35: CPT | Mod: 25

## 2019-01-19 RX ORDER — OXYCODONE HYDROCHLORIDE 5 MG/1
10 TABLET ORAL EVERY 6 HOURS
Qty: 0 | Refills: 0 | Status: DISCONTINUED | OUTPATIENT
Start: 2019-01-19 | End: 2019-01-20

## 2019-01-19 RX ORDER — SODIUM CHLORIDE 9 MG/ML
1000 INJECTION INTRAMUSCULAR; INTRAVENOUS; SUBCUTANEOUS
Qty: 0 | Refills: 0 | Status: DISCONTINUED | OUTPATIENT
Start: 2019-01-19 | End: 2019-01-20

## 2019-01-19 RX ORDER — ONDANSETRON 8 MG/1
4 TABLET, FILM COATED ORAL EVERY 8 HOURS
Qty: 0 | Refills: 0 | Status: DISCONTINUED | OUTPATIENT
Start: 2019-01-19 | End: 2019-01-23

## 2019-01-19 RX ORDER — SODIUM CHLORIDE 9 MG/ML
250 INJECTION INTRAMUSCULAR; INTRAVENOUS; SUBCUTANEOUS ONCE
Qty: 0 | Refills: 0 | Status: COMPLETED | OUTPATIENT
Start: 2019-01-19 | End: 2019-01-20

## 2019-01-19 RX ORDER — INSULIN HUMAN 100 [IU]/ML
1 INJECTION, SOLUTION SUBCUTANEOUS
Qty: 100 | Refills: 0 | Status: DISCONTINUED | OUTPATIENT
Start: 2019-01-19 | End: 2019-01-20

## 2019-01-19 RX ORDER — MORPHINE SULFATE 50 MG/1
2 CAPSULE, EXTENDED RELEASE ORAL ONCE
Qty: 0 | Refills: 0 | Status: DISCONTINUED | OUTPATIENT
Start: 2019-01-19 | End: 2019-01-19

## 2019-01-19 RX ORDER — ACETAMINOPHEN 500 MG
650 TABLET ORAL EVERY 6 HOURS
Qty: 0 | Refills: 0 | Status: COMPLETED | OUTPATIENT
Start: 2019-01-19 | End: 2019-01-22

## 2019-01-19 RX ORDER — SODIUM CHLORIDE 9 MG/ML
250 INJECTION INTRAMUSCULAR; INTRAVENOUS; SUBCUTANEOUS ONCE
Qty: 0 | Refills: 0 | Status: COMPLETED | OUTPATIENT
Start: 2019-01-19 | End: 2019-01-19

## 2019-01-19 RX ORDER — PANTOPRAZOLE SODIUM 20 MG/1
40 TABLET, DELAYED RELEASE ORAL
Qty: 0 | Refills: 0 | Status: DISCONTINUED | OUTPATIENT
Start: 2019-01-20 | End: 2019-01-23

## 2019-01-19 RX ORDER — OXYCODONE HYDROCHLORIDE 5 MG/1
5 TABLET ORAL EVERY 6 HOURS
Qty: 0 | Refills: 0 | Status: DISCONTINUED | OUTPATIENT
Start: 2019-01-19 | End: 2019-01-21

## 2019-01-19 RX ORDER — AMIODARONE HYDROCHLORIDE 400 MG/1
200 TABLET ORAL
Qty: 0 | Refills: 0 | Status: DISCONTINUED | OUTPATIENT
Start: 2019-01-19 | End: 2019-01-23

## 2019-01-19 RX ADMIN — Medication 10: at 06:32

## 2019-01-19 RX ADMIN — POTASSIUM PHOSPHATE, MONOBASIC POTASSIUM PHOSPHATE, DIBASIC 62.5 MILLIMOLE(S): 236; 224 INJECTION, SOLUTION INTRAVENOUS at 05:39

## 2019-01-19 RX ADMIN — HEPARIN SODIUM 5000 UNIT(S): 5000 INJECTION INTRAVENOUS; SUBCUTANEOUS at 13:55

## 2019-01-19 RX ADMIN — HEPARIN SODIUM 5000 UNIT(S): 5000 INJECTION INTRAVENOUS; SUBCUTANEOUS at 05:03

## 2019-01-19 RX ADMIN — Medication 25 GRAM(S): at 01:37

## 2019-01-19 RX ADMIN — HEPARIN SODIUM 5000 UNIT(S): 5000 INJECTION INTRAVENOUS; SUBCUTANEOUS at 21:10

## 2019-01-19 RX ADMIN — Medication 650 MILLIGRAM(S): at 18:07

## 2019-01-19 RX ADMIN — Medication 650 MILLIGRAM(S): at 18:36

## 2019-01-19 RX ADMIN — Medication 25 MILLIGRAM(S): at 18:08

## 2019-01-19 RX ADMIN — ONDANSETRON 4 MILLIGRAM(S): 8 TABLET, FILM COATED ORAL at 05:03

## 2019-01-19 RX ADMIN — ONDANSETRON 4 MILLIGRAM(S): 8 TABLET, FILM COATED ORAL at 23:09

## 2019-01-19 RX ADMIN — AMIODARONE HYDROCHLORIDE 200 MILLIGRAM(S): 400 TABLET ORAL at 18:07

## 2019-01-19 RX ADMIN — HYDROMORPHONE HYDROCHLORIDE 0.5 MILLIGRAM(S): 2 INJECTION INTRAMUSCULAR; INTRAVENOUS; SUBCUTANEOUS at 00:20

## 2019-01-19 RX ADMIN — SODIUM CHLORIDE 500 MILLILITER(S): 9 INJECTION INTRAMUSCULAR; INTRAVENOUS; SUBCUTANEOUS at 01:37

## 2019-01-19 RX ADMIN — HYDROMORPHONE HYDROCHLORIDE 0.5 MILLIGRAM(S): 2 INJECTION INTRAMUSCULAR; INTRAVENOUS; SUBCUTANEOUS at 05:25

## 2019-01-19 RX ADMIN — MORPHINE SULFATE 2 MILLIGRAM(S): 50 CAPSULE, EXTENDED RELEASE ORAL at 21:09

## 2019-01-19 RX ADMIN — SODIUM CHLORIDE 3000 MILLILITER(S): 9 INJECTION INTRAMUSCULAR; INTRAVENOUS; SUBCUTANEOUS at 04:39

## 2019-01-19 RX ADMIN — Medication 650 MILLIGRAM(S): at 12:00

## 2019-01-19 RX ADMIN — PANTOPRAZOLE SODIUM 40 MILLIGRAM(S): 20 TABLET, DELAYED RELEASE ORAL at 11:40

## 2019-01-19 RX ADMIN — SODIUM CHLORIDE 250 MILLILITER(S): 9 INJECTION INTRAMUSCULAR; INTRAVENOUS; SUBCUTANEOUS at 06:29

## 2019-01-19 RX ADMIN — CHLORHEXIDINE GLUCONATE 1 APPLICATION(S): 213 SOLUTION TOPICAL at 05:25

## 2019-01-19 RX ADMIN — Medication 650 MILLIGRAM(S): at 11:40

## 2019-01-19 RX ADMIN — MORPHINE SULFATE 2 MILLIGRAM(S): 50 CAPSULE, EXTENDED RELEASE ORAL at 21:24

## 2019-01-19 NOTE — PROGRESS NOTE ADULT - ASSESSMENT
Assesment :  1-  s/p  SBO  sec  to  abd  hernia   recovering  from surgey  well   2-Anemia    stable  no  need  for  blood  tfx.   3-H/O Ovarian cancer  s/p  surgey   and  chemotherapy   in Remission     Plan     iv hydration   monitor electrolytes  start on procrit 40,000 unit q weekly,due for  hgb < 10  gm.  transfuse to keep hb >7  cont other supportive care.  will need MRCP to evaluate pancreatic lesion,can be done as outpt when acute issue is resolved.  cont other supportive care as per Sx.  D/W  pt  and  family

## 2019-01-19 NOTE — DIETITIAN INITIAL EVALUATION ADULT. - OTHER INFO
Reason for RD assessment: NPO/clear liquids x5 days. Pertinent Medical Information: p/w abd pain. high risk pt w/ partial sbo 2/2 to incarcerated ventral hernia;  s/p repair w/ mesh and removal of adhesions. Reason for RD assessment: NPO/clear liquids x5 days. Pertinent Medical Information: p/w abd pain. high risk pt w/ partial sbo 2/2 to incarcerated ventral hernia;  s/p repair w/ mesh and removal of adhesions. Anemia - noted stable. Per heme/onc - H/O Ovarian cancer  s/p  surgey   and  chemotherapy - in remission (not noted in PMH).

## 2019-01-19 NOTE — DIETITIAN INITIAL EVALUATION ADULT. - NUTRITIONGOAL OUTCOME1
Diet advanced as medically feasible; pt to demonstrate tolerance to diet with 75%+ po intake x3 days

## 2019-01-19 NOTE — PROGRESS NOTE ADULT - SUBJECTIVE AND OBJECTIVE BOX
Progress Note: General Surgery  Patient: JILLIAN SEE , 73y (1945)Female   MRN: [id number]  Location: [address]  Visit: 01-15-19 Inpatient  Date: 01-19-19 @ 03:11    Procedure/Diagnosis: s/p repair of incarcerated ventral hernia w/ mesh    Events/ 24h: No acute events overnight. Pain controlled.    Vitals: T(F): 98.1 (01-19-19 @ 00:00), Max: 100.3 (01-18-19 @ 11:00)  HR: 76 (01-19-19 @ 02:00)  BP: 85/53 (01-19-19 @ 02:00) (81/44 - 110/62)  RR: 16 (01-19-19 @ 02:00)  SpO2: 98% (01-19-19 @ 02:00)    In:   01-17-19 @ 07:01  -  01-18-19 @ 07:00  --------------------------------------------------------  IN: 2247 mL    01-18-19 @ 07:01 - 01-19-19 @ 03:11  --------------------------------------------------------  IN: 3021.7 mL      Out:   01-17-19 @ 07:01 - 01-18-19 @ 07:00  --------------------------------------------------------  OUT:    Bulb: 180 mL    Indwelling Catheter - Urethral: 695 mL    Nasoenteral Tube: 185 mL  Total OUT: 1060 mL      01-18-19 @ 07:01 - 01-19-19 @ 03:11  --------------------------------------------------------  OUT:    Bulb: 25 mL    Indwelling Catheter - Urethral: 460 mL  Total OUT: 485 mL        Net:   01-17-19 @ 07:01  -  01-18-19 @ 07:00  --------------------------------------------------------  NET: 1187 mL    01-18-19 @ 07:01 - 01-19-19 @ 03:11  --------------------------------------------------------  NET: 2536.7 mL        Diet: Diet, NPO:   Except Medications  With Ice Chips/Sips of Water (01-18-19 @ 09:58)    IV Fluids: dextrose 5%. 1000 milliLiter(s) (50 mL/Hr) IV Continuous <Continuous>  dextrose 5%. 1000 milliLiter(s) (50 mL/Hr) IV Continuous <Continuous>  potassium phosphate IVPB 15 milliMole(s) IV Intermittent once  sodium chloride 0.9% Bolus 250 milliLiter(s) IV Bolus once  sodium chloride 0.9%. 1000 milliLiter(s) (100 mL/Hr) IV Continuous <Continuous>      Physical Examination:  General Appearance: NAD  HEENT: EOMI, sclera non-icteric.  Heart: RRR   Lungs: CTABL.   Abdomen:  Soft, nontender, nondistended. Bulb draining serosanguinous fluid  : Ureña  MSK/Extremities: Warm & well-perfused. Peripheral pulses intact.  Skin: Warm, dry. No jaundice.       Medications: [Standing]  amiodarone Infusion 1 mG/Min (33.333 mL/Hr) IV Continuous <Continuous>  amiodarone Infusion 0.5 mG/Min (16.667 mL/Hr) IV Continuous <Continuous>  chlorhexidine 4% Liquid 1 Application(s) Topical <User Schedule>  dextrose 5%. 1000 milliLiter(s) (50 mL/Hr) IV Continuous <Continuous>  dextrose 5%. 1000 milliLiter(s) (50 mL/Hr) IV Continuous <Continuous>  dextrose 50% Injectable 25 Gram(s) IV Push once  dextrose 50% Injectable 25 Gram(s) IV Push once  heparin  Injectable 5000 Unit(s) SubCutaneous every 8 hours  insulin lispro (HumaLOG) corrective regimen sliding scale   SubCutaneous three times a day before meals  metoprolol tartrate 25 milliGRAM(s) Oral two times a day  ondansetron Injectable 4 milliGRAM(s) IV Push every 8 hours  pantoprazole  Injectable 40 milliGRAM(s) IV Push daily  potassium phosphate IVPB 15 milliMole(s) IV Intermittent once  sodium chloride 0.9% Bolus 250 milliLiter(s) IV Bolus once  sodium chloride 0.9%. 1000 milliLiter(s) (100 mL/Hr) IV Continuous <Continuous>    DVT Prophylaxis: heparin  Injectable 5000 Unit(s) SubCutaneous every 8 hours    GI Prophylaxis: pantoprazole  Injectable 40 milliGRAM(s) IV Push daily    Antibiotics:   Anticoagulation:   Medications:[PRN]  dextrose 40% Gel 15 Gram(s) Oral once PRN  glucagon  Injectable 1 milliGRAM(s) IntraMuscular once PRN  HYDROmorphone  Injectable 0.25 milliGRAM(s) IV Push every 4 hours PRN  HYDROmorphone  Injectable 0.5 milliGRAM(s) IV Push every 4 hours PRN      Labs:                        8.0    11.31 )-----------( 158      ( 18 Jan 2019 22:54 )             27.2     01-18    132<L>  |  95<L>  |  15  ----------------------------<  220<H>  4.2   |  19  |  1.1    Ca    8.1<L>      18 Jan 2019 22:54  Phos  2.5     01-18  Mg     1.9     01-18    TPro  5.4<L>  /  Alb  3.3<L>  /  TBili  0.7  /  DBili  0.4<H>  /  AST  19  /  ALT  14  /  AlkPhos  70  01-17    LIVER FUNCTIONS - ( 17 Jan 2019 18:30 )  Alb: 3.3 g/dL / Pro: 5.4 g/dL / ALK PHOS: 70 U/L / ALT: 14 U/L / AST: 19 U/L / GGT: x           PT/INR - ( 18 Jan 2019 22:54 )   PT: 13.60 sec;   INR: 1.18 ratio         PTT - ( 18 Jan 2019 22:54 )  PTT:30.2 sec    CARDIAC MARKERS ( 18 Jan 2019 04:40 )  x     / <0.01 ng/mL / 47 U/L / x     / 1.6 ng/mL  CARDIAC MARKERS ( 17 Jan 2019 23:35 )  x     / <0.01 ng/mL / x     / x     / 1.8 ng/mL  CARDIAC MARKERS ( 17 Jan 2019 18:30 )  x     / <0.01 ng/mL / 47 U/L / x     / 1.5 ng/mL      Imaging:     < from: Xray Chest 1 View- PORTABLE-Routine (01.18.19 @ 06:11) >  Cardiomegaly. Support devices as described    < end of copied text >    < from: CT Abdomen and Pelvis w/ IV Cont (01.15.19 @ 14:10) >  Multiple dilated loops of small bowel, leading up to a transition point   located within a large ventral wall abdominal hernia. Findings are   consistent with small bowel obstruction.    There is a 6 mm hypodensity at the junction of the body and tail the   pancreas, incompletely evaluated. This may represent a side branch I PMN.   However, further evaluation with pancreatic MRI with and without contrast   and MRCP imaging is recommended    < end of copied text >    Assessment:  73y Female patient admitted s/p repair of incarcerated ventral hernia w/ mesh    Plan:    NGT out  Npo w/ sips  Ureña. D/c in AM  Amio drip  DVT/GI ppx  OOBAT  IS  Pain control    Date/Time: 01-19-19 @ 03:11

## 2019-01-19 NOTE — DIETITIAN INITIAL EVALUATION ADULT. - MD RECOMMEND
Recommendation: If/when medically feasible to advance diet to solids, provide Consistent Carbohydrate diet modifier. If pt to remain on clear liquid diet, add Prosource Gelatein Sugar Free q24hrs.

## 2019-01-19 NOTE — DIETITIAN INITIAL EVALUATION ADULT. - PHYSICAL APPEARANCE
BMI: 33.6. Alert & oriented. Abd noted mildly distended. Last BM reported PTP. No chewing/swallowing difficulty reported. Skin: Surgical incision.

## 2019-01-19 NOTE — PROGRESS NOTE ADULT - SUBJECTIVE AND OBJECTIVE BOX
SUBJ:No chest pain or shortness of breath      MEDICATIONS  (STANDING):  acetaminophen   Tablet .. 650 milliGRAM(s) Oral every 6 hours  amiodarone    Tablet 200 milliGRAM(s) Oral two times a day  chlorhexidine 4% Liquid 1 Application(s) Topical <User Schedule>  dextrose 5%. 1000 milliLiter(s) (50 mL/Hr) IV Continuous <Continuous>  dextrose 5%. 1000 milliLiter(s) (50 mL/Hr) IV Continuous <Continuous>  dextrose 50% Injectable 25 Gram(s) IV Push once  dextrose 50% Injectable 25 Gram(s) IV Push once  heparin  Injectable 5000 Unit(s) SubCutaneous every 8 hours  insulin Infusion 1 Unit(s)/Hr (1 mL/Hr) IV Continuous <Continuous>  metoprolol tartrate 25 milliGRAM(s) Oral two times a day  oxyCODONE    IR 10 milliGRAM(s) Oral every 6 hours  pantoprazole    Tablet 40 milliGRAM(s) Oral before breakfast  sodium chloride 0.9% Bolus 250 milliLiter(s) IV Bolus once  sodium chloride 0.9%. 1000 milliLiter(s) (75 mL/Hr) IV Continuous <Continuous>    MEDICATIONS  (PRN):  dextrose 40% Gel 15 Gram(s) Oral once PRN Blood Glucose LESS THAN 70 milliGRAM(s)/deciliter  glucagon  Injectable 1 milliGRAM(s) IntraMuscular once PRN Glucose LESS THAN 70 milligrams/deciliter  ondansetron Injectable 4 milliGRAM(s) IV Push every 8 hours PRN Nausea and/or Vomiting  oxyCODONE    IR 5 milliGRAM(s) Oral every 6 hours PRN Moderate Pain (4 - 6)            Vital Signs Last 24 Hrs  T(C): 36.7 (19 Jan 2019 16:00), Max: 36.7 (19 Jan 2019 00:00)  T(F): 98.1 (19 Jan 2019 16:00), Max: 98.1 (19 Jan 2019 00:00)  HR: 82 (19 Jan 2019 19:00) (72 - 94)  BP: 95/53 (19 Jan 2019 19:00) (81/44 - 140/72)  BP(mean): 77 (19 Jan 2019 19:00) (58 - 102)  RR: 36 (19 Jan 2019 19:00) (14 - 36)  SpO2: 96% (19 Jan 2019 19:00) (92% - 99%)      ECG:NML    TTE:    LABS:                        8.0    11.31 )-----------( 158      ( 18 Jan 2019 22:54 )             27.2     01-18    132<L>  |  95<L>  |  15  ----------------------------<  220<H>  4.2   |  19  |  1.1    Ca    8.1<L>      18 Jan 2019 22:54  Phos  2.5     01-18  Mg     1.9     01-18      CARDIAC MARKERS ( 18 Jan 2019 04:40 )  x     / <0.01 ng/mL / 47 U/L / x     / 1.6 ng/mL  CARDIAC MARKERS ( 17 Jan 2019 23:35 )  x     / <0.01 ng/mL / x     / x     / 1.8 ng/mL      PT/INR - ( 18 Jan 2019 22:54 )   PT: 13.60 sec;   INR: 1.18 ratio         PTT - ( 18 Jan 2019 22:54 )  PTT:30.2 sec    I&O's Summary    18 Jan 2019 07:01  -  19 Jan 2019 07:00  --------------------------------------------------------  IN: 4355.2 mL / OUT: 615 mL / NET: 3740.2 mL    19 Jan 2019 07:01  -  19 Jan 2019 20:06  --------------------------------------------------------  IN: 780.8 mL / OUT: 285 mL / NET: 495.8 mL      BNP

## 2019-01-19 NOTE — PROGRESS NOTE ADULT - SUBJECTIVE AND OBJECTIVE BOX
74 yo F with hx of ovarian cancer,last chemo in 2017,in remission since then.  chronic anemia,on procrit weekly  admitted with abdo pain ,found to have   SBO and t-point in large chronically incarcerated incisional hernia  s/p  surgery  on  1/18 and  then tfxed  to  ICU  and  doing better     Pt is seen and examined  pt is out of bed to chair  pt seems comfortable and denies any complaints at this time          ROS:  Negative except for:    MEDICATIONS  (STANDING):  acetaminophen   Tablet .. 650 milliGRAM(s) Oral every 6 hours  amiodarone    Tablet 200 milliGRAM(s) Oral two times a day  chlorhexidine 4% Liquid 1 Application(s) Topical <User Schedule>  dextrose 5%. 1000 milliLiter(s) (50 mL/Hr) IV Continuous <Continuous>  dextrose 5%. 1000 milliLiter(s) (50 mL/Hr) IV Continuous <Continuous>  dextrose 50% Injectable 25 Gram(s) IV Push once  dextrose 50% Injectable 25 Gram(s) IV Push once  heparin  Injectable 5000 Unit(s) SubCutaneous every 8 hours  insulin Infusion 1 Unit(s)/Hr (1 mL/Hr) IV Continuous <Continuous>  metoprolol tartrate 25 milliGRAM(s) Oral two times a day  oxyCODONE    IR 10 milliGRAM(s) Oral every 6 hours  pantoprazole    Tablet 40 milliGRAM(s) Oral before breakfast  sodium chloride 0.9% Bolus 250 milliLiter(s) IV Bolus once  sodium chloride 0.9%. 1000 milliLiter(s) (75 mL/Hr) IV Continuous <Continuous>    MEDICATIONS  (PRN):  dextrose 40% Gel 15 Gram(s) Oral once PRN Blood Glucose LESS THAN 70 milliGRAM(s)/deciliter  glucagon  Injectable 1 milliGRAM(s) IntraMuscular once PRN Glucose LESS THAN 70 milligrams/deciliter  ondansetron Injectable 4 milliGRAM(s) IV Push every 8 hours PRN Nausea and/or Vomiting  oxyCODONE    IR 5 milliGRAM(s) Oral every 6 hours PRN Moderate Pain (4 - 6)      Allergies    ciprofloxacin (Hives)  penicillin (Rash)    Intolerances        Vital Signs Last 24 Hrs  T(C): 36.7 (19 Jan 2019 16:00), Max: 37.5 (18 Jan 2019 18:00)  T(F): 98.1 (19 Jan 2019 16:00), Max: 99.5 (18 Jan 2019 18:00)  HR: 90 (19 Jan 2019 16:00) (72 - 102)  BP: 121/74 (19 Jan 2019 16:00) (81/44 - 121/74)  BP(mean): 102 (19 Jan 2019 16:00) (58 - 102)  RR: 31 (19 Jan 2019 16:00) (14 - 44)  SpO2: 96% (19 Jan 2019 16:00) (92% - 99%)    PHYSICAL EXAM  General: adult in NAD  HEENT: clear oropharynx, anicteric sclera, pink conjunctiva  Neck: supple  CV: normal S1/S2 with no murmur rubs or gallops  Lungs: positive air movement b/l ant lungs,clear to auscultation, no wheezes, no rales  Abdomen: soft non-tender non-distended, no hepatosplenomegaly  Ext: no clubbing cyanosis or edema  Skin: no rashes and no petechiae  Neuro: alert and oriented X 4, no focal deficits  LABS:                          8.0    11.31 )-----------( 158      ( 18 Jan 2019 22:54 )             27.2         Mean Cell Volume : 89.2 fL  Mean Cell Hemoglobin : 26.2 pg  Mean Cell Hemoglobin Concentration : 29.4 g/dL  Auto Neutrophil # : x  Auto Lymphocyte # : x  Auto Monocyte # : x  Auto Eosinophil # : x  Auto Basophil # : x  Auto Neutrophil % : x  Auto Lymphocyte % : x  Auto Monocyte % : x  Auto Eosinophil % : x  Auto Basophil % : x    Serial CBC's  01-18 @ 22:54  Hct-27.2 / Hgb-8.0 / Plat-158 / RBC-3.05 / WBC-11.31          Serial CBC's  01-18 @ 11:00  Hct-27.5 / Hgb-8.2 / Plat-152 / RBC-3.16 / WBC-8.43          Serial CBC's  01-18 @ 04:40  Hct-28.0 / Hgb-8.4 / Plat-147 / RBC-3.23 / WBC-7.51          Serial CBC's  01-17 @ 23:35  Hct-23.4 / Hgb-6.8 / Plat-155 / RBC-2.70 / WBC-9.17          Serial CBC's  01-17 @ 18:30  Hct-28.1 / Hgb-8.1 / Plat-232 / RBC-3.22 / WBC-12.99            01-18    132<L>  |  95<L>  |  15  ----------------------------<  220<H>  4.2   |  19  |  1.1    Ca    8.1<L>      18 Jan 2019 22:54  Phos  2.5     01-18  Mg     1.9     01-18    TPro  5.4<L>  /  Alb  3.3<L>  /  TBili  0.7  /  DBili  0.4<H>  /  AST  19  /  ALT  14  /  AlkPhos  70  01-17      PT/INR - ( 18 Jan 2019 22:54 )   PT: 13.60 sec;   INR: 1.18 ratio         PTT - ( 18 Jan 2019 22:54 )  PTT:30.2 sec    Hemoglobin: 8.0 g/dL (01-18-19 @ 22:54)  WBC Count: 11.31 K/uL (01-18-19 @ 22:54)  Hematocrit: 27.2 % (01-18-19 @ 22:54)  Platelet Count - Automated: 158 K/uL (01-18-19 @ 22:54)  Hematocrit: 27.5 % (01-18-19 @ 11:00)  Platelet Count - Automated: 152 K/uL (01-18-19 @ 11:00)  Hemoglobin: 8.2 g/dL (01-18-19 @ 11:00)  WBC Count: 8.43 K/uL (01-18-19 @ 11:00)  Platelet Count - Automated: 147 K/uL (01-18-19 @ 04:40)  Hemoglobin: 8.4 g/dL (01-18-19 @ 04:40)  WBC Count: 7.51 K/uL (01-18-19 @ 04:40)  Hematocrit: 28.0 % (01-18-19 @ 04:40)  Hematocrit: 23.4 % (01-17-19 @ 23:35)  Platelet Count - Automated: 155 K/uL (01-17-19 @ 23:35)  Hemoglobin: 6.8 g/dL (01-17-19 @ 23:35)  WBC Count: 9.17 K/uL (01-17-19 @ 23:35)  Hemoglobin: 8.1 g/dL (01-17-19 @ 18:30)  WBC Count: 12.99 K/uL (01-17-19 @ 18:30)  Platelet Count - Automated: 232 K/uL (01-17-19 @ 18:30)  Hematocrit: 28.1 % (01-17-19 @ 18:30)  Hematocrit: 28.3 % (01-16-19 @ 23:51)  WBC Count: 8.96 K/uL (01-16-19 @ 23:51)  Platelet Count - Automated: 185 K/uL (01-16-19 @ 23:51)  Hemoglobin: 8.1 g/dL (01-16-19 @ 23:51)  Hematocrit: 28.1 % (01-15-19 @ 23:54)  Platelet Count - Automated: 193 K/uL (01-15-19 @ 23:54)  Hemoglobin: 8.4 g/dL (01-15-19 @ 23:54)  WBC Count: 7.66 K/uL (01-15-19 @ 23:54)  WBC Count: 10.39 K/uL (01-15-19 @ 10:30)  Hematocrit: 31.7 % (01-15-19 @ 10:30)  Platelet Count - Automated: 252 K/uL (01-15-19 @ 10:30)  Hemoglobin: 9.2 g/dL (01-15-19 @ 10:30)                BLOOD SMEAR INTERPRETATION:       RADIOLOGY & ADDITIONAL STUDIES:

## 2019-01-19 NOTE — DIETITIAN INITIAL EVALUATION ADULT. - ENERGY NEEDS
Energy: 4360-6196 kcal/day (MSJx1.1-1.2 AF)    Protein: 53-62 g/day (1.2-1.4 g/kg IBW)    Fluids: per ICU team

## 2019-01-19 NOTE — PHARMACOTHERAPY INTERVENTION NOTE - COMMENTS
d/w surgical team, evaluate:  -amiodarone 0.5 mg/min x18 hrs to be completed later in day-change to 200mg po q12h  -ondansetron 4mg IV q8h ATC, changed to prn n/v

## 2019-01-19 NOTE — DIETITIAN INITIAL EVALUATION ADULT. - PERTINENT LABORATORY DATA
1/18: RBC-3.05, H/H-8.0/27.2, Na-132, Cl-95, gluc-220, PO4-2.5 (WDL), Mg-1.9 (WDL); 1/16: CedP4A-2.5%

## 2019-01-19 NOTE — PROGRESS NOTE ADULT - SUBJECTIVE AND OBJECTIVE BOX
JILLIAN SEE  [id number]  73y Female    Indication for ICU admission: high risk pt w/ partial sbo 2/2 to incarcerated ventral hernia;  s/p repair w/ mesh and removal of adhesions-- pt tacchy 126s and tmax 102.7 pre-operative.    Admit Date:01-15-19  ICU Date: 1/17/19  OR Date: 1/17/19    ciprofloxacin (Hives)  penicillin (Rash)    PAST MEDICAL & SURGICAL HISTORY:  Bronchitis  Diabetes mellitus, type 2    Home Medications:  benazepril 10 mg oral tablet: 1 tab(s) orally once a day (25 Jun 2018 18:49)  GlipiZIDE XL 10 mg oral tablet, extended release: 1 tab(s) orally once a day (25 Jun 2018 18:49)  Levemir FlexTouch:  (25 Jun 2018 18:49)  metFORMIN 1000 mg oral tablet: 1 tab(s) orally 2 times a day (25 Jun 2018 18:49)    OR time: 2hrs      EBL: 100         IV Fluids: 1800      Blood Products: none  UOP:  150        24HRS EVENT:    Neurologic: A+O, follows commands.  morphine q6, and diuladid prn     Respiratory: NC 2L sat at 96%, +spirometer.  -PulmHTN no rx    Cardiovascular: Hx CHF (EF 35), hypo in PACU  -home med: Benazepril 10mg and lasix;; Now on Vasotec 0.625mg q6 and metoprolol tartrate 2.6mg q8; post op 88/48 responded to 250ccbolus+ then 2(250cc)overnight a total of 750cc since OR.  PO meds started- metoprolol 25mg bid w/ parameters.   Pt started on amiodarone bolus and drip.  cardio- dr. lara called for follow up on pts rhythm.     Gastrointestinal/Nutrition: NPO, +ice chips, NG tube removed, +PPI    Renal/Genitourinary: Ureña, voided; intra op IVF 1800, post op 750cc given.     Hematologic: chronic anemia baseline 8-9-- procrit 40,000 unit qweekly- last 1/16  -SubQ Heparin  Overnight Hgb 6.8, transfuse one unit prbcs, CBC post=8.4 ____    Infectious Disease: Tmax 100.9 intraop; has received 2 doses of clindamycin will discuss. Has not been febrile since episode.    Lines/Tubes: 1/17 a. line,  2 peripheral IV    Endocrine: DM- sliding scale.     Disposition: ICU admission for monitoring 24 hours then re-access.         DVT PTX: HepSubQ    GI PTX: Protonix    ***Tubes/Lines/Drains  ***  Peripheral IV  Arterial Line	Yes	                Date 1/17/19           Urinary Catheter		Indication: Strict I&O    Date Placed: 1/17/2019    REVIEW OF SYSTEMS    [ X] A ten-point review of systems was otherwise negative except as noted.  [ ] Due to altered mental status/intubation, subjective information were not able to be obtained from the patient. History was obtained, to the extent possible, from review of the chart and collateral sources of information.     Daily     Daily     Diet, NPO:   Except Medications  With Ice Chips/Sips of Water (01-18-19 @ 09:58)      CURRENT MEDS:  Neurologic Medications  HYDROmorphone  Injectable 0.25 milliGRAM(s) IV Push every 4 hours PRN Moderate Pain (4 - 6)  HYDROmorphone  Injectable 0.5 milliGRAM(s) IV Push every 4 hours PRN Severe Pain (7 - 10)  ondansetron Injectable 4 milliGRAM(s) IV Push every 8 hours    Respiratory Medications    Cardiovascular Medications  amiodarone Infusion 1 mG/Min IV Continuous <Continuous>  amiodarone Infusion 0.5 mG/Min IV Continuous <Continuous>  metoprolol tartrate 25 milliGRAM(s) Oral two times a day    Gastrointestinal Medications  dextrose 5%. 1000 milliLiter(s) IV Continuous <Continuous>  dextrose 5%. 1000 milliLiter(s) IV Continuous <Continuous>  pantoprazole  Injectable 40 milliGRAM(s) IV Push daily  sodium chloride 0.9% Bolus 250 milliLiter(s) IV Bolus once  sodium chloride 0.9%. 1000 milliLiter(s) IV Continuous <Continuous>    Genitourinary Medications    Hematologic/Oncologic Medications  heparin  Injectable 5000 Unit(s) SubCutaneous every 8 hours    Antimicrobial/Immunologic Medications    Endocrine/Metabolic Medications  dextrose 40% Gel 15 Gram(s) Oral once PRN Blood Glucose LESS THAN 70 milliGRAM(s)/deciliter  dextrose 50% Injectable 25 Gram(s) IV Push once  dextrose 50% Injectable 25 Gram(s) IV Push once  glucagon  Injectable 1 milliGRAM(s) IntraMuscular once PRN Glucose LESS THAN 70 milligrams/deciliter  insulin lispro (HumaLOG) corrective regimen sliding scale   SubCutaneous three times a day before meals    Topical/Other Medications  chlorhexidine 4% Liquid 1 Application(s) Topical <User Schedule>      ICU Vital Signs Last 24 Hrs  T(C): 35.8 (19 Jan 2019 04:00), Max: 37.9 (18 Jan 2019 11:00)  T(F): 96.5 (19 Jan 2019 04:00), Max: 100.3 (18 Jan 2019 11:00)  HR: 72 (19 Jan 2019 07:00) (72 - 133)  BP: 97/45 (19 Jan 2019 07:00) (81/44 - 110/62)  BP(mean): 68 (19 Jan 2019 07:00) (60 - 89)  ABP: 110/64 (19 Jan 2019 06:00) (84/62 - 136/66)  ABP(mean): 82 (19 Jan 2019 06:00) (62 - 86)  RR: 15 (19 Jan 2019 07:00) (14 - 44)  SpO2: 97% (19 Jan 2019 07:00) (94% - 99%)      Adult Advanced Hemodynamics Last 24 Hrs  CVP(mm Hg): --  CVP(cm H2O): --  CO: --  CI: --  PA: --  PA(mean): --  PCWP: --  SVR: --  SVRI: --  PVR: --  PVRI: --          I&O's Summary    18 Jan 2019 07:01  -  19 Jan 2019 07:00  --------------------------------------------------------  IN: 4355.2 mL / OUT: 615 mL / NET: 3740.2 mL      I&O's Detail    18 Jan 2019 07:01  -  19 Jan 2019 07:00  --------------------------------------------------------  IN:    amiodarone Infusion: 450.2 mL    Oral Fluid: 5 mL    Sodium Chloride 0.9% IV Bolus: 1500 mL    sodium chloride 0.9%.: 2400 mL  Total IN: 4355.2 mL    OUT:    Bulb: 35 mL    Indwelling Catheter - Urethral: 580 mL  Total OUT: 615 mL    Total NET: 3740.2 mL          PHYSICAL EXAM:    General/Neuro  RASS:             GCS:     = E   / V   / M      Deficits:                             alert & oriented x 3, no focal deficits  Pupils:    Lungs:      clear to auscultation, Normal expansion/effort.     Cardiovascular : S1, S2.  Regular rate and rhythm.  Peripheral edema   Cardiac Rhythm: Normal Sinus Rhythm    GI: Abdomen soft, Non-tender, Non-distended.    Gastrostomy / Jejunostomy tube in place.  Nasogastric tube in place.  Colostomy / Ileostomy.    Wound:    Extremities: Extremities warm, pink, well-perfused. Pulses:Rt     Lt    Derm: Good skin turgor, no skin breakdown.      :       Ureña catheter in place.      Imaging:     LABS:  CAPILLARY BLOOD GLUCOSE      POCT Blood Glucose.: 263 mg/dL (19 Jan 2019 08:33)  POCT Blood Glucose.: 274 mg/dL (19 Jan 2019 07:52)  POCT Blood Glucose.: 274 mg/dL (19 Jan 2019 06:19)  POCT Blood Glucose.: 234 mg/dL (18 Jan 2019 21:38)  POCT Blood Glucose.: 222 mg/dL (18 Jan 2019 18:03)  POCT Blood Glucose.: 190 mg/dL (18 Jan 2019 13:29)  POCT Blood Glucose.: 193 mg/dL (18 Jan 2019 09:24)                          8.0    11.31 )-----------( 158      ( 18 Jan 2019 22:54 )             27.2       01-18    132<L>  |  95<L>  |  15  ----------------------------<  220<H>  4.2   |  19  |  1.1    Ca    8.1<L>      18 Jan 2019 22:54  Phos  2.5     01-18  Mg     1.9     01-18    TPro  5.4<L>  /  Alb  3.3<L>  /  TBili  0.7  /  DBili  0.4<H>  /  AST  19  /  ALT  14  /  AlkPhos  70  01-17      PT/INR - ( 18 Jan 2019 22:54 )   PT: 13.60 sec;   INR: 1.18 ratio         PTT - ( 18 Jan 2019 22:54 )  PTT:30.2 sec  CARDIAC MARKERS ( 18 Jan 2019 04:40 )  x     / <0.01 ng/mL / 47 U/L / x     / 1.6 ng/mL  CARDIAC MARKERS ( 17 Jan 2019 23:35 )  x     / <0.01 ng/mL / x     / x     / 1.8 ng/mL  CARDIAC MARKERS ( 17 Jan 2019 18:30 )  x     / <0.01 ng/mL / 47 U/L / x     / 1.5 ng/mL JILLIAN SEE  [id number]  73y Female    Indication for ICU admission: high risk pt w/ partial sbo 2/2 to incarcerated ventral hernia;  s/p repair w/ mesh and removal of adhesions-- pt tacchy 126s and tmax 102.7 pre-operative.    Admit Date:01-15-19  ICU Date: 1/17/19  OR Date: 1/17/19    ciprofloxacin (Hives)  penicillin (Rash)    PAST MEDICAL & SURGICAL HISTORY:  Bronchitis  Diabetes mellitus, type 2    Home Medications:  benazepril 10 mg oral tablet: 1 tab(s) orally once a day (25 Jun 2018 18:49)  GlipiZIDE XL 10 mg oral tablet, extended release: 1 tab(s) orally once a day (25 Jun 2018 18:49)  Levemir FlexTouch:  (25 Jun 2018 18:49)  metFORMIN 1000 mg oral tablet: 1 tab(s) orally 2 times a day (25 Jun 2018 18:49)    OR time: 2hrs      EBL: 100         IV Fluids: 1800      Blood Products: none  UOP:  150        24HRS EVENT:    Neurologic: A+O, follows commands.  morphine q6, and diuladid prn     Respiratory: NC 2L sat at 96%, +spirometer.  -PulmHTN no rx    Cardiovascular: Hx CHF (EF 35), hypo in PACU  -home med: Benazepril 10mg and lasix;; Now on Vasotec 0.625mg q6 and metoprolol tartrate 2.6mg q8; post op 88/48 responded to 250ccbolus+ then 2(250cc)overnight a total of 750cc since OR.  PO meds started- metoprolol 25mg bid w/ parameters.   Pt started on amiodarone bolus and drip.  cardio- dr. lara called for follow up on pts rhythm.     Gastrointestinal/Nutrition: NPO, +ice chips, NG tube removed, +PPI    Renal/Genitourinary: Ureña, voided; intra op IVF 1800, post op 750cc given.     Hematologic: chronic anemia baseline 8-9-- procrit 40,000 unit qweekly- last 1/16  -SubQ Heparin  Overnight Hgb 6.8, transfuse one unit prbcs, CBC post=8.4 ____    Infectious Disease: Tmax 100.9 intraop; has received 2 doses of clindamycin will discuss. Has not been febrile since episode.    Lines/Tubes: 1/17 a. line,  2 peripheral IV    Endocrine: DM- sliding scale.     Disposition: ICU admission for monitoring 24 hours then re-access.         DVT PTX: HepSubQ    GI PTX: Protonix    ***Tubes/Lines/Drains  ***  Peripheral IV  Arterial Line	Yes	                Date 1/17/19           Urinary Catheter		Indication: Strict I&O    Date Placed: 1/17/2019    REVIEW OF SYSTEMS    [ X] A ten-point review of systems was otherwise negative except as noted.  [ ] Due to altered mental status/intubation, subjective information were not able to be obtained from the patient. History was obtained, to the extent possible, from review of the chart and collateral sources of information.     Daily     Daily     Diet, NPO:   Except Medications  With Ice Chips/Sips of Water (01-18-19 @ 09:58)      CURRENT MEDS:  Neurologic Medications  HYDROmorphone  Injectable 0.25 milliGRAM(s) IV Push every 4 hours PRN Moderate Pain (4 - 6)  HYDROmorphone  Injectable 0.5 milliGRAM(s) IV Push every 4 hours PRN Severe Pain (7 - 10)  ondansetron Injectable 4 milliGRAM(s) IV Push every 8 hours    Respiratory Medications    Cardiovascular Medications  amiodarone Infusion 1 mG/Min IV Continuous <Continuous>  amiodarone Infusion 0.5 mG/Min IV Continuous <Continuous>  metoprolol tartrate 25 milliGRAM(s) Oral two times a day    Gastrointestinal Medications  dextrose 5%. 1000 milliLiter(s) IV Continuous <Continuous>  dextrose 5%. 1000 milliLiter(s) IV Continuous <Continuous>  pantoprazole  Injectable 40 milliGRAM(s) IV Push daily  sodium chloride 0.9% Bolus 250 milliLiter(s) IV Bolus once  sodium chloride 0.9%. 1000 milliLiter(s) IV Continuous <Continuous>    Genitourinary Medications    Hematologic/Oncologic Medications  heparin  Injectable 5000 Unit(s) SubCutaneous every 8 hours    Antimicrobial/Immunologic Medications    Endocrine/Metabolic Medications  dextrose 40% Gel 15 Gram(s) Oral once PRN Blood Glucose LESS THAN 70 milliGRAM(s)/deciliter  dextrose 50% Injectable 25 Gram(s) IV Push once  dextrose 50% Injectable 25 Gram(s) IV Push once  glucagon  Injectable 1 milliGRAM(s) IntraMuscular once PRN Glucose LESS THAN 70 milligrams/deciliter  insulin lispro (HumaLOG) corrective regimen sliding scale   SubCutaneous three times a day before meals    Topical/Other Medications  chlorhexidine 4% Liquid 1 Application(s) Topical <User Schedule>      ICU Vital Signs Last 24 Hrs  T(C): 35.8 (19 Jan 2019 04:00), Max: 37.9 (18 Jan 2019 11:00)  T(F): 96.5 (19 Jan 2019 04:00), Max: 100.3 (18 Jan 2019 11:00)  HR: 72 (19 Jan 2019 07:00) (72 - 133)  BP: 97/45 (19 Jan 2019 07:00) (81/44 - 110/62)  BP(mean): 68 (19 Jan 2019 07:00) (60 - 89)  ABP: 110/64 (19 Jan 2019 06:00) (84/62 - 136/66)  ABP(mean): 82 (19 Jan 2019 06:00) (62 - 86)  RR: 15 (19 Jan 2019 07:00) (14 - 44)  SpO2: 97% (19 Jan 2019 07:00) (94% - 99%)          I&O's Summary    18 Jan 2019 07:01  -  19 Jan 2019 07:00  --------------------------------------------------------  IN: 4355.2 mL / OUT: 615 mL / NET: 3740.2 mL      I&O's Detail    18 Jan 2019 07:01  -  19 Jan 2019 07:00  --------------------------------------------------------  IN:    amiodarone Infusion: 450.2 mL    Oral Fluid: 5 mL    Sodium Chloride 0.9% IV Bolus: 1500 mL    sodium chloride 0.9%.: 2400 mL  Total IN: 4355.2 mL    OUT:    Bulb: 35 mL    Indwelling Catheter - Urethral: 580 mL  Total OUT: 615 mL    Total NET: 3740.2 mL          PHYSICAL EXAM:    General/Neuro NAD  RASS: 1            GCS:     = E 4  / V 5  / M 6     Deficits:      alert & oriented x 3, no focal deficits    Lungs:      clear to auscultation, Normal expansion/effort.     Cardiovascular : S1, S2.  Regular rate and rhythm.    GI: Abdomen soft, tender, mildly distended, aviva in llq, abdominal binder in place      Extremities: Extremities warm, pink, well-perfused.      Imaging: < from: Xray Chest 1 View- PORTABLE-Routine (01.19.19 @ 05:42) >  Impression:      No radiographic evidence of acute cardiopulmonary disease.    < end of copied text >      LABS:  CAPILLARY BLOOD GLUCOSE      POCT Blood Glucose.: 263 mg/dL (19 Jan 2019 08:33)  POCT Blood Glucose.: 274 mg/dL (19 Jan 2019 07:52)  POCT Blood Glucose.: 274 mg/dL (19 Jan 2019 06:19)  POCT Blood Glucose.: 234 mg/dL (18 Jan 2019 21:38)  POCT Blood Glucose.: 222 mg/dL (18 Jan 2019 18:03)  POCT Blood Glucose.: 190 mg/dL (18 Jan 2019 13:29)  POCT Blood Glucose.: 193 mg/dL (18 Jan 2019 09:24)                          8.0    11.31 )-----------( 158      ( 18 Jan 2019 22:54 )             27.2       01-18    132<L>  |  95<L>  |  15  ----------------------------<  220<H>  4.2   |  19  |  1.1    Ca    8.1<L>      18 Jan 2019 22:54  Phos  2.5     01-18  Mg     1.9     01-18    TPro  5.4<L>  /  Alb  3.3<L>  /  TBili  0.7  /  DBili  0.4<H>  /  AST  19  /  ALT  14  /  AlkPhos  70  01-17      PT/INR - ( 18 Jan 2019 22:54 )   PT: 13.60 sec;   INR: 1.18 ratio         PTT - ( 18 Jan 2019 22:54 )  PTT:30.2 sec  CARDIAC MARKERS ( 18 Jan 2019 04:40 )  x     / <0.01 ng/mL / 47 U/L / x     / 1.6 ng/mL  CARDIAC MARKERS ( 17 Jan 2019 23:35 )  x     / <0.01 ng/mL / x     / x     / 1.8 ng/mL  CARDIAC MARKERS ( 17 Jan 2019 18:30 )  x     / <0.01 ng/mL / 47 U/L / x     / 1.5 ng/mL

## 2019-01-19 NOTE — PROGRESS NOTE ADULT - ASSESSMENT
ASSESSMENT:  73y Female w/ CHF EF 35, DM, chronic anemia (Hg ~8-9, gets procrit), active gastric cancer on chemo at [facility], [place] Dr. Pratt, hx of ovarian ca/ s/p hysterectomy and oophorectomy 2 years ago presents for partial sbo 2/2 to  ventral hernia;  s/p repair w/ mesh and removal of adhesions    PLAN:   Neurologic: A+O, follows commands, diuladid prn pain  Respiratory: NC 2L sat at 98, Spirometer  Cardiovascular: Hx CHF (EF 35)- Home med: Benazepril 10mg and lasix;; post op hypotensive - 88/48 responded to fluid boluses. metoprolol 2.5mg added since admitted.  Cardio-lara  Today 1/18 pt will be change to PO meds- Lininopril 10mg and Metoprolol 25mg bid with parameters.   Gastrointestinal/Nutrition: NPO except for ice chips and some sips of water, NG tube dc 1/18, +PPI  Renal/Genitourinary: Ureña, voided; intra op IVF 1800, post op 250cc given. Urin 30-80cc/hr; JULIÁN: 180cc  K=4.2, Cr 0.9  Hematologic: chronic anemia baseline 8-9-- procrit 40,000 unit qweekly- last 1/16.  Post op Hg 6.8 received 1 pRBC 1/18; Hg 8.4. Pt on Heparin subq.   Infectious Disease: Tmax 100.9 intraop-all other temps afebrile. pt received 2 doses of clindamycin. will be discontinued today.  Lines/Tubes: 1/17 a. line,  2 peripheral IV  Endocrine: DM- sliding scale, FS elevated --> tighten RISS.   Disposition: SICU care    --------------------------------------------------------------------------------------    Critical Care Diagnoses:artial sbo 2/2 to incarcerated ventral hernia;  s/p repair w/ mesh and removal of adhesions

## 2019-01-19 NOTE — DIETITIAN INITIAL EVALUATION ADULT. - SOURCE
Fair appetite & po intake until onset of abd discomfort PTP. Regular diet PTP. NKFA. No supplements. UBW ~72.7 kg, wt reportedly stable at this time.

## 2019-01-20 LAB
ANION GAP SERPL CALC-SCNC: 17 MMOL/L — HIGH (ref 7–14)
APTT BLD: 32.8 SEC — SIGNIFICANT CHANGE UP (ref 27–39.2)
BUN SERPL-MCNC: 15 MG/DL — SIGNIFICANT CHANGE UP (ref 10–20)
CALCIUM SERPL-MCNC: 8.6 MG/DL — SIGNIFICANT CHANGE UP (ref 8.5–10.1)
CHLORIDE SERPL-SCNC: 98 MMOL/L — SIGNIFICANT CHANGE UP (ref 98–110)
CO2 SERPL-SCNC: 20 MMOL/L — SIGNIFICANT CHANGE UP (ref 17–32)
CREAT SERPL-MCNC: 1 MG/DL — SIGNIFICANT CHANGE UP (ref 0.7–1.5)
GLUCOSE BLDC GLUCOMTR-MCNC: 127 MG/DL — HIGH (ref 70–99)
GLUCOSE BLDC GLUCOMTR-MCNC: 131 MG/DL — HIGH (ref 70–99)
GLUCOSE BLDC GLUCOMTR-MCNC: 137 MG/DL — HIGH (ref 70–99)
GLUCOSE BLDC GLUCOMTR-MCNC: 145 MG/DL — HIGH (ref 70–99)
GLUCOSE BLDC GLUCOMTR-MCNC: 150 MG/DL — HIGH (ref 70–99)
GLUCOSE BLDC GLUCOMTR-MCNC: 152 MG/DL — HIGH (ref 70–99)
GLUCOSE BLDC GLUCOMTR-MCNC: 155 MG/DL — HIGH (ref 70–99)
GLUCOSE BLDC GLUCOMTR-MCNC: 159 MG/DL — HIGH (ref 70–99)
GLUCOSE BLDC GLUCOMTR-MCNC: 160 MG/DL — HIGH (ref 70–99)
GLUCOSE BLDC GLUCOMTR-MCNC: 161 MG/DL — HIGH (ref 70–99)
GLUCOSE BLDC GLUCOMTR-MCNC: 172 MG/DL — HIGH (ref 70–99)
GLUCOSE BLDC GLUCOMTR-MCNC: 175 MG/DL — HIGH (ref 70–99)
GLUCOSE BLDC GLUCOMTR-MCNC: 176 MG/DL — HIGH (ref 70–99)
GLUCOSE BLDC GLUCOMTR-MCNC: 182 MG/DL — HIGH (ref 70–99)
GLUCOSE BLDC GLUCOMTR-MCNC: 245 MG/DL — HIGH (ref 70–99)
GLUCOSE SERPL-MCNC: 144 MG/DL — HIGH (ref 70–99)
HCT VFR BLD CALC: 27.8 % — LOW (ref 37–47)
HGB BLD-MCNC: 8.3 G/DL — LOW (ref 12–16)
INR BLD: 1.07 RATIO — SIGNIFICANT CHANGE UP (ref 0.65–1.3)
MAGNESIUM SERPL-MCNC: 2 MG/DL — SIGNIFICANT CHANGE UP (ref 1.8–2.4)
MCHC RBC-ENTMCNC: 26.4 PG — LOW (ref 27–31)
MCHC RBC-ENTMCNC: 29.9 G/DL — LOW (ref 32–37)
MCV RBC AUTO: 88.5 FL — SIGNIFICANT CHANGE UP (ref 81–99)
NRBC # BLD: 0 /100 WBCS — SIGNIFICANT CHANGE UP (ref 0–0)
PHOSPHATE SERPL-MCNC: 1.7 MG/DL — LOW (ref 2.1–4.9)
PHOSPHATE SERPL-MCNC: 2.9 MG/DL — SIGNIFICANT CHANGE UP (ref 2.1–4.9)
PLATELET # BLD AUTO: 179 K/UL — SIGNIFICANT CHANGE UP (ref 130–400)
POTASSIUM SERPL-MCNC: 4.7 MMOL/L — SIGNIFICANT CHANGE UP (ref 3.5–5)
POTASSIUM SERPL-SCNC: 4.7 MMOL/L — SIGNIFICANT CHANGE UP (ref 3.5–5)
PROTHROM AB SERPL-ACNC: 12.3 SEC — SIGNIFICANT CHANGE UP (ref 9.95–12.87)
RBC # BLD: 3.14 M/UL — LOW (ref 4.2–5.4)
RBC # FLD: 17.2 % — HIGH (ref 11.5–14.5)
SODIUM SERPL-SCNC: 135 MMOL/L — SIGNIFICANT CHANGE UP (ref 135–146)
WBC # BLD: 11.06 K/UL — HIGH (ref 4.8–10.8)
WBC # FLD AUTO: 11.06 K/UL — HIGH (ref 4.8–10.8)

## 2019-01-20 PROCEDURE — 99232 SBSQ HOSP IP/OBS MODERATE 35: CPT

## 2019-01-20 RX ORDER — POTASSIUM PHOSPHATE, MONOBASIC POTASSIUM PHOSPHATE, DIBASIC 236; 224 MG/ML; MG/ML
30 INJECTION, SOLUTION INTRAVENOUS ONCE
Qty: 0 | Refills: 0 | Status: COMPLETED | OUTPATIENT
Start: 2019-01-20 | End: 2019-01-20

## 2019-01-20 RX ORDER — SODIUM CHLORIDE 9 MG/ML
250 INJECTION INTRAMUSCULAR; INTRAVENOUS; SUBCUTANEOUS ONCE
Qty: 0 | Refills: 0 | Status: COMPLETED | OUTPATIENT
Start: 2019-01-20 | End: 2019-01-20

## 2019-01-20 RX ORDER — MORPHINE SULFATE 50 MG/1
2 CAPSULE, EXTENDED RELEASE ORAL ONCE
Qty: 0 | Refills: 0 | Status: DISCONTINUED | OUTPATIENT
Start: 2019-01-20 | End: 2019-01-20

## 2019-01-20 RX ORDER — DOCUSATE SODIUM 100 MG
100 CAPSULE ORAL THREE TIMES A DAY
Qty: 0 | Refills: 0 | Status: DISCONTINUED | OUTPATIENT
Start: 2019-01-20 | End: 2019-01-23

## 2019-01-20 RX ORDER — CARVEDILOL PHOSPHATE 80 MG/1
0 CAPSULE, EXTENDED RELEASE ORAL
Qty: 180 | Refills: 0 | COMMUNITY

## 2019-01-20 RX ORDER — ALPRAZOLAM 0.25 MG
1 TABLET ORAL
Qty: 0 | Refills: 0 | COMMUNITY

## 2019-01-20 RX ORDER — ALPRAZOLAM 0.25 MG
0 TABLET ORAL
Qty: 60 | Refills: 0 | COMMUNITY

## 2019-01-20 RX ORDER — CARVEDILOL PHOSPHATE 80 MG/1
3.12 CAPSULE, EXTENDED RELEASE ORAL EVERY 12 HOURS
Qty: 0 | Refills: 0 | Status: DISCONTINUED | OUTPATIENT
Start: 2019-01-20 | End: 2019-01-23

## 2019-01-20 RX ORDER — FUROSEMIDE 40 MG
1 TABLET ORAL
Qty: 0 | Refills: 0 | COMMUNITY

## 2019-01-20 RX ORDER — SENNA PLUS 8.6 MG/1
2 TABLET ORAL AT BEDTIME
Qty: 0 | Refills: 0 | Status: DISCONTINUED | OUTPATIENT
Start: 2019-01-20 | End: 2019-01-20

## 2019-01-20 RX ORDER — INSULIN GLARGINE 100 [IU]/ML
67 INJECTION, SOLUTION SUBCUTANEOUS AT BEDTIME
Qty: 0 | Refills: 0 | Status: DISCONTINUED | OUTPATIENT
Start: 2019-01-20 | End: 2019-01-21

## 2019-01-20 RX ORDER — CARVEDILOL PHOSPHATE 80 MG/1
1 CAPSULE, EXTENDED RELEASE ORAL
Qty: 0 | Refills: 0 | COMMUNITY

## 2019-01-20 RX ORDER — FUROSEMIDE 40 MG
0 TABLET ORAL
Qty: 135 | Refills: 0 | COMMUNITY

## 2019-01-20 RX ADMIN — Medication 100 MILLIGRAM(S): at 14:16

## 2019-01-20 RX ADMIN — AMIODARONE HYDROCHLORIDE 200 MILLIGRAM(S): 400 TABLET ORAL at 18:33

## 2019-01-20 RX ADMIN — Medication 650 MILLIGRAM(S): at 19:00

## 2019-01-20 RX ADMIN — CARVEDILOL PHOSPHATE 3.12 MILLIGRAM(S): 80 CAPSULE, EXTENDED RELEASE ORAL at 18:33

## 2019-01-20 RX ADMIN — Medication 25 MILLIGRAM(S): at 06:06

## 2019-01-20 RX ADMIN — Medication 650 MILLIGRAM(S): at 12:37

## 2019-01-20 RX ADMIN — Medication 650 MILLIGRAM(S): at 18:33

## 2019-01-20 RX ADMIN — Medication 650 MILLIGRAM(S): at 06:06

## 2019-01-20 RX ADMIN — Medication 100 MILLIGRAM(S): at 22:46

## 2019-01-20 RX ADMIN — INSULIN GLARGINE 67 UNIT(S): 100 INJECTION, SOLUTION SUBCUTANEOUS at 22:47

## 2019-01-20 RX ADMIN — HEPARIN SODIUM 5000 UNIT(S): 5000 INJECTION INTRAVENOUS; SUBCUTANEOUS at 14:17

## 2019-01-20 RX ADMIN — PANTOPRAZOLE SODIUM 40 MILLIGRAM(S): 20 TABLET, DELAYED RELEASE ORAL at 06:06

## 2019-01-20 RX ADMIN — HEPARIN SODIUM 5000 UNIT(S): 5000 INJECTION INTRAVENOUS; SUBCUTANEOUS at 22:46

## 2019-01-20 RX ADMIN — Medication 650 MILLIGRAM(S): at 00:42

## 2019-01-20 RX ADMIN — SODIUM CHLORIDE 250 MILLILITER(S): 9 INJECTION INTRAMUSCULAR; INTRAVENOUS; SUBCUTANEOUS at 07:31

## 2019-01-20 RX ADMIN — AMIODARONE HYDROCHLORIDE 200 MILLIGRAM(S): 400 TABLET ORAL at 06:06

## 2019-01-20 RX ADMIN — Medication 650 MILLIGRAM(S): at 00:41

## 2019-01-20 RX ADMIN — POTASSIUM PHOSPHATE, MONOBASIC POTASSIUM PHOSPHATE, DIBASIC 85 MILLIMOLE(S): 236; 224 INJECTION, SOLUTION INTRAVENOUS at 07:26

## 2019-01-20 RX ADMIN — SODIUM CHLORIDE 3000 MILLILITER(S): 9 INJECTION INTRAMUSCULAR; INTRAVENOUS; SUBCUTANEOUS at 09:00

## 2019-01-20 RX ADMIN — HEPARIN SODIUM 5000 UNIT(S): 5000 INJECTION INTRAVENOUS; SUBCUTANEOUS at 06:06

## 2019-01-20 RX ADMIN — Medication 650 MILLIGRAM(S): at 13:00

## 2019-01-20 RX ADMIN — SODIUM CHLORIDE 3000 MILLILITER(S): 9 INJECTION INTRAMUSCULAR; INTRAVENOUS; SUBCUTANEOUS at 07:32

## 2019-01-20 NOTE — PROGRESS NOTE ADULT - ASSESSMENT
A/P:  73 year old female with CHFrEF s/p repair of incarcerated ventral hernia w/ mesh   Plan:  -Afebrile, WBC normal.    -Received NiCOM bolus overnight, MAP 63-73.  -Tolerating clears  -pain well controlled per patient  -insulin gtt continues, will attempt to wean gtt per ICU today  -f/u labs  -f/u I/O's  -Abdominal binder  -DVT/GI ppx  -OOBAT  -IS A/P:  73 year old female with CHFrEF s/p repair of incarcerated ventral hernia w/ mesh, now POD 3.  Plan:  -Afebrile, WBC normal.    -Received NiCOM bolus overnight, MAP 63-73.  -Tolerating clears  -pain well controlled per patient  -insulin gtt continues, will attempt to wean gtt per ICU today  -f/u labs  -f/u I/O's  -Abdominal binder  -DVT/GI ppx  -OOBAT  -IS

## 2019-01-20 NOTE — PROGRESS NOTE ADULT - ASSESSMENT
ASSESSMENT:  73y Female w/ CHF EF 35, DM, chronic anemia (Hg ~8-9, gets procrit), active gastric cancer on chemo at [facility] Dr. Pratt, hx of ovarian ca/ s/p hysterectomy and oophorectomy 2 years ago presents for partial sbo 2/2 to  ventral hernia;  s/p repair w/ mesh and removal of adhesions    PLAN:   Neurologic: A+O, follows commands, pain control with percocet, attempt to wean off opioid medication  Respiratory: No dx, encourage Spirometer  Cardiovascular: Hx CHF (EF 35)- BP stable on current regimen, no further significant hypotensive episodes  Gastrointestinal/Nutrition: clears, advance as per primary team  Renal/Genitourinary:  passed tov, elecrolyte repletion prn, strict is and os  Hematologic: chronic anemia baseline 8-9-- procrit 40,000 unit qweekly- last 1/16.   Infectious Disease: No dx  Lines/Tubes: peripherals  Endocrine: insulin drip, attempt to wean to iss  Disposition: SICU care, possible dg if stable and off insulin drip ASSESSMENT:  73y Female w/ CHF EF 35, DM, chronic anemia (Hg ~8-9, gets procrit), active gastric cancer on chemo at [facility] Dr. Pratt, hx of ovarian ca/ s/p hysterectomy and oophorectomy 2 years ago presents for partial sbo 2/2 to  ventral hernia;  s/p repair w/ mesh and removal of adhesions    PLAN:     Neurologic: A+O, follows commands.  pain control switched to oral regimen, given 1 dose of morphine overnight for pain    Neurologic Medications  acetaminophen   Tablet .. 650 milliGRAM(s) Oral every 6 hours  morphine  - Injectable 2 milliGRAM(s) IV Push once  ondansetron Injectable 4 milliGRAM(s) IV Push every 8 hours PRN Nausea and/or Vomiting  oxyCODONE    IR 5 milliGRAM(s) Oral every 6 hours PRN Moderate Pain (4 - 6)  oxyCODONE    IR 10 milliGRAM(s) Oral every 6 hours    Respiratory: on to room air    RR: 24 (19 @ 07:00) (17 - 36)  SpO2: 96% (19 @ 07:00) (92% - 98%)    Cardiovascular: Hx CHF (EF 35), hypotensive overnight, resolved in am, NICOM non responsive.   -home med: Benazepril 10mg and lasix;; Now on metoprolol and amio (switched from drip to oral)  cardio- dr. lara called for follow up on pts rhythm.     HR: 80 (19 @ 07:00) (74 - 94)  BP: 104/62 (19 @ 07:00) (77/41 - 140/72)  BP(mean): 77 (19 @ 07:00) (46 - 102)  ABP: 98/88 (19 @ 11:00) (76/62 - 98/88)  ABP(mean): 94 (19 @ 11:00) (72 - 94)    Cardiovascular Medications  amiodarone    Tablet 200 milliGRAM(s) Oral two times a day  metoprolol tartrate 25 milliGRAM(s) Oral two times a day    Gastrointestinal/Nutrition: partial sbo 2/2 to incarcerated ventral hernia;  s/p repair w/ mesh and removal of adhesions. started on clear  -  Bulb: 45 mL    Gastrointestinal Medications  pantoprazole    Tablet 40 milliGRAM(s) Oral before breakfast    Renal/Genitourinary: Olson dc'd yesterday, voided, but w/ low urine output, bladder scan w/ 50 cc, patient bolused 250cc x 2, and olson reinserted.   -Strict I&O  -Replete electrolytes PRN    UOP: Indwelling Catheter - Urethral: 265 mL    BUN/Creat:   15  15  /1.0 (19 @ 00:36)  1.1 (19 @ 22:54)    Na:  135 (19 @ 00:36)  132 (19 @ 22:54)    K:  4.7 (19 00:36)  4.2 (19:54)    M.0 (19 @ 00:36)  1.9 (19 @ 22:54)    Phos:   1.7 (19 00:36)  2.5 (19:54)    Hematologic: chronic anemia baseline --- procrit 40,000 unit qweekly- last   -SubQ Heparin    Hematologic/Oncologic Medications  heparin  Injectable 5000 Unit(s) SubCutaneous every 8 hours    Hb:  8.3 (19 @ 00:36)  8.0 (19 @ 22:54)    Plt:  179 (19 @ 00:36)  158 (19 @ 22:54)    INR:  1.07 (19 @ 00:36)  1.18 (19 22:54)    PTT:  32.8 (19 @ 00:36)  30.2 (19 @ 22:54)    Infectious Disease: remains afebrile, no dx or abx    T(F): 96.3 (19 @ 04:00), Max: 98.2 (19 @ 20:00)  WBC:   11.06 (19 @ 00:36)  11.31 (19 @ 22:54)    Lines/Tubes:  2 peripheral IV    Endocrine: Hx of DM,  insulin drip started 2/2 poor glucose control, will attempt to titrate down to ISS.    Endocrine/Metabolic Medications  insulin Infusion 1 Unit(s)/Hr IV Continuous <Continuous>    Glucose:  131 (19 @ 06:59)  155 (19 @ 05:48)  160 (19 @ 04:53)  182 (19 @ 03:43)    Hb A1C:  7.5 (19 @ 08:29)    Disposition: Continue ICU care.

## 2019-01-20 NOTE — PROGRESS NOTE ADULT - SUBJECTIVE AND OBJECTIVE BOX
JILLIAN SEE  [id number]  73y Female    Indication for ICU admission: high risk pt w/ partial sbo 2/2 to incarcerated ventral hernia;  s/p repair w/ mesh and removal of adhesions-- pt tacchy 126s and tmax 102.7 pre-operative.    Admit Date:01-15-19  ICU Date: 1/17/19  OR Date: 1/17/19    ciprofloxacin (Hives)  penicillin (Rash)    PAST MEDICAL & SURGICAL HISTORY:  Bronchitis  Diabetes mellitus, type 2    Home Medications:  benazepril 10 mg oral tablet: 1 tab(s) orally once a day (25 Jun 2018 18:49)  GlipiZIDE XL 10 mg oral tablet, extended release: 1 tab(s) orally once a day (25 Jun 2018 18:49)  Levemir FlexTouch:  (25 Jun 2018 18:49)  metFORMIN 1000 mg oral tablet: 1 tab(s) orally 2 times a day (25 Jun 2018 18:49)    OR time: 2hrs      EBL: 100         IV Fluids: 1800      Blood Products: none  UOP:  150        24HRS EVENT:    Neurologic: A+O, follows commands.  pain control switched to oral regimen, given 1 dose of morphine overnight for pain    Respiratory: on to room air    Cardiovascular: Hx CHF (EF 35), hypotensive overnight, resolved in am  -home med: Benazepril 10mg and lasix;; Now on metoprolol and amio (switched from drip to oral)  cardio- dr. lara called for follow up on pts rhythm.     Gastrointestinal/Nutrition: started on clears    Renal/Genitourinary: Adelita post'd passed tov    Hematologic: chronic anemia baseline 8-9-- procrit 40,000 unit qweekly- last 1/16  -SubQ Heparin    Infectious Disease: remains afebrile, no dx or abx    Lines/Tubes:  2 peripheral IV    Endocrine: insulin drip started 2/2 poor glucose control, will attempt to titrate down to ISS      DVT PTX: HepSubQ    GI PTX: Protonix    ***Tubes/Lines/Drains  ***  Peripheral IV  Arterial Line	Yes	                Date 1/17/19           Urinary Catheter		Indication: Strict I&O    Date Placed: 1/17/2019    REVIEW OF SYSTEMS    [ X] A ten-point review of systems was otherwise negative except as noted.  [ ] Due to altered mental status/intubation, subjective information were not able to be obtained from the patient. History was obtained, to the extent possible, from review of the chart and collateral sources of information. JILLIAN SEE  [id number]  73y Female    Indication for ICU admission: high risk pt w/ partial sbo 2/2 to incarcerated ventral hernia;  s/p repair w/ mesh and removal of adhesions-- pt tacchy 126s and tmax 102.7 pre-operative.    Admit Date:01-15-19  ICU Date: 1/17/19  OR Date: 1/17/19    ciprofloxacin (Hives)  penicillin (Rash)    PAST MEDICAL & SURGICAL HISTORY:  Bronchitis  Diabetes mellitus, type 2    Home Medications:  benazepril 10 mg oral tablet: 1 tab(s) orally once a day (25 Jun 2018 18:49)  GlipiZIDE XL 10 mg oral tablet, extended release: 1 tab(s) orally once a day (25 Jun 2018 18:49)  Levemir FlexTouch:  (25 Jun 2018 18:49)  metFORMIN 1000 mg oral tablet: 1 tab(s) orally 2 times a day (25 Jun 2018 18:49)    OR time: 2hrs      EBL: 100         IV Fluids: 1800      Blood Products: none  UOP:  150        24HRS EVENT:    Neurologic: A+O, follows commands.  pain control switched to oral regimen, given 1 dose of morphine overnight for pain    Respiratory: on to room air    Cardiovascular: Hx CHF (EF 35), hypotensive overnight, resolved in am  -home med: Benazepril 10mg and lasix;; Now on metoprolol and amio (switched from drip to oral)  cardio- dr. lara called for follow up on pts rhythm.     Gastrointestinal/Nutrition: started on clears    Renal/Genitourinary: Adelita post'd passed tov    Hematologic: chronic anemia baseline 8-9-- procrit 40,000 unit qweekly- last 1/16  -SubQ Heparin    Infectious Disease: remains afebrile, no dx or abx    Lines/Tubes:  2 peripheral IV    Endocrine: insulin drip started 2/2 poor glucose control, will attempt to titrate down to ISS    DVT PTX: HepSubQ    GI PTX: Protonix    ***Tubes/Lines/Drains  ***  Peripheral IV  Arterial Line	Yes	                Date 1/17/19         Urinary Catheter		Indication: Strict I&O    Date Placed: 1/17/2019    REVIEW OF SYSTEMS    [ X] A ten-point review of systems was otherwise negative except as noted.  [ ] Due to altered mental status/intubation, subjective information were not able to be obtained from the patient. History was obtained, to the extent possible, from review of the chart and collateral sources of information.     Daily Height in cm: 149.86 (19 Jan 2019 17:06)      Diet, Clear Liquid (01-19-19 @ 10:23)    CURRENT MEDS:  Neurologic Medications  acetaminophen   Tablet .. 650 milliGRAM(s) Oral every 6 hours  morphine  - Injectable 2 milliGRAM(s) IV Push once  ondansetron Injectable 4 milliGRAM(s) IV Push every 8 hours PRN Nausea and/or Vomiting  oxyCODONE    IR 5 milliGRAM(s) Oral every 6 hours PRN Moderate Pain (4 - 6)  oxyCODONE    IR 10 milliGRAM(s) Oral every 6 hours    Respiratory Medications    Cardiovascular Medications  amiodarone    Tablet 200 milliGRAM(s) Oral two times a day  metoprolol tartrate 25 milliGRAM(s) Oral two times a day    Gastrointestinal Medications  dextrose 5%. 1000 milliLiter(s) IV Continuous <Continuous>  dextrose 5%. 1000 milliLiter(s) IV Continuous <Continuous>  pantoprazole    Tablet 40 milliGRAM(s) Oral before breakfast  sodium chloride 0.9%. 1000 milliLiter(s) IV Continuous <Continuous>    Genitourinary Medications    Hematologic/Oncologic Medications  heparin  Injectable 5000 Unit(s) SubCutaneous every 8 hours    Antimicrobial/Immunologic Medications    Endocrine/Metabolic Medications  dextrose 40% Gel 15 Gram(s) Oral once PRN Blood Glucose LESS THAN 70 milliGRAM(s)/deciliter  dextrose 50% Injectable 25 Gram(s) IV Push once  dextrose 50% Injectable 25 Gram(s) IV Push once  glucagon  Injectable 1 milliGRAM(s) IntraMuscular once PRN Glucose LESS THAN 70 milligrams/deciliter  insulin Infusion 1 Unit(s)/Hr IV Continuous <Continuous>    Topical/Other Medications  chlorhexidine 4% Liquid 1 Application(s) Topical <User Schedule>    ICU Vital Signs Last 24 Hrs  T(C): 35.7 (20 Jan 2019 04:00), Max: 36.8 (19 Jan 2019 20:00)  T(F): 96.3 (20 Jan 2019 04:00), Max: 98.2 (19 Jan 2019 20:00)  HR: 80 (20 Jan 2019 07:00) (74 - 94)  BP: 104/62 (20 Jan 2019 07:00) (77/41 - 140/72)  BP(mean): 77 (20 Jan 2019 07:00) (46 - 102)  ABP: 98/88 (19 Jan 2019 11:00) (76/62 - 98/88)  ABP(mean): 94 (19 Jan 2019 11:00) (72 - 94)  RR: 24 (20 Jan 2019 07:00) (17 - 36)  SpO2: 96% (20 Jan 2019 07:00) (92% - 98%)    I&O's Summary    19 Jan 2019 07:01  -  20 Jan 2019 07:00  --------------------------------------------------------  IN: 2783.8 mL / OUT: 485 mL / NET: 2298.8 mL      I&O's Detail    19 Jan 2019 07:01  -  20 Jan 2019 07:00  --------------------------------------------------------  IN:    amiodarone Infusion: 66.8 mL    insulin Infusion: 67 mL    IV PiggyBack: 500 mL    sodium chloride 0.9%: 300 mL    Sodium Chloride 0.9% IV Bolus: 500 mL    sodium chloride 0.9%.: 1350 mL  Total IN: 2783.8 mL    OUT:    Bulb: 45 mL    Indwelling Catheter - Urethral: 265 mL    Voided: 175 mL  Total OUT: 485 mL    Total NET: 2298.8 mL    PHYSICAL EXAM:    General/Neuro NAD  RASS: 1            GCS:     = E 4  / V 5  / M 6     Deficits:      alert & oriented x 3, no focal deficits    Lungs:      clear to auscultation, Normal expansion/effort.     Cardiovascular : S1, S2.  Regular rate and rhythm.    GI: Abdomen soft, tender, mildly distended, aviva in llq, abdominal binder in place    :       Ureña catheter in place.    LABS:  CAPILLARY BLOOD GLUCOSE    POCT Blood Glucose.: 145 mg/dL (20 Jan 2019 08:19)  POCT Blood Glucose.: 131 mg/dL (20 Jan 2019 06:59)  POCT Blood Glucose.: 155 mg/dL (20 Jan 2019 05:48)  POCT Blood Glucose.: 160 mg/dL (20 Jan 2019 04:53)  POCT Blood Glucose.: 182 mg/dL (20 Jan 2019 03:43)  POCT Blood Glucose.: 176 mg/dL (20 Jan 2019 02:56)  POCT Blood Glucose.: 175 mg/dL (20 Jan 2019 01:59)  POCT Blood Glucose.: 161 mg/dL (20 Jan 2019 00:45)  POCT Blood Glucose.: 137 mg/dL (20 Jan 2019 00:16)  POCT Blood Glucose.: 128 mg/dL (19 Jan 2019 23:04)  POCT Blood Glucose.: 116 mg/dL (19 Jan 2019 22:04)  POCT Blood Glucose.: 158 mg/dL (19 Jan 2019 20:47)  POCT Blood Glucose.: 162 mg/dL (19 Jan 2019 19:53)  POCT Blood Glucose.: 167 mg/dL (19 Jan 2019 18:55)  POCT Blood Glucose.: 137 mg/dL (19 Jan 2019 18:11)  POCT Blood Glucose.: 162 mg/dL (19 Jan 2019 17:02)  POCT Blood Glucose.: 181 mg/dL (19 Jan 2019 16:06)  POCT Blood Glucose.: 265 mg/dL (19 Jan 2019 14:55)  POCT Blood Glucose.: 330 mg/dL (19 Jan 2019 14:00)  POCT Blood Glucose.: 293 mg/dL (19 Jan 2019 12:46)  POCT Blood Glucose.: 259 mg/dL (19 Jan 2019 10:06)  POCT Blood Glucose.: 263 mg/dL (19 Jan 2019 08:33)                          8.3    11.06 )-----------( 179      ( 20 Jan 2019 00:36 )             27.8       01-20    135  |  98  |  15  ----------------------------<  144<H>  4.7   |  20  |  1.0    Ca    8.6      20 Jan 2019 00:36  Phos  1.7     01-20  Mg     2.0     01-20      PT/INR - ( 20 Jan 2019 00:36 )   PT: 12.30 sec;   INR: 1.07 ratio    PTT - ( 20 Jan 2019 00:36 )  PTT:32.8 sec

## 2019-01-20 NOTE — PROGRESS NOTE ADULT - SUBJECTIVE AND OBJECTIVE BOX
GENERAL SURGERY PROGRESS NOTE     JILLIAN SEE  73y  Female  Hospital day :5d  POD:  Procedure: Enterolysis  Repair of incarcerated ventral hernia with mesh    OVERNIGHT EVENTS:  stable, afebrile.  Tolerating diet clear liquids.  Receiving NiCOM bolus.  Insulin gtt continues, glucose fairly controlled.      T(F): 96.3 (01-20-19 @ 04:00), Max: 98.2 (01-19-19 @ 20:00)  HR: 84 (01-20-19 @ 03:00) (72 - 94)  BP: 107/58 (01-20-19 @ 03:00) (77/41 - 140/72)  ABP: 98/88 (01-19-19 @ 11:00) (76/62 - 110/64)  ABP(mean): 94 (01-19-19 @ 11:00) (72 - 94)  RR: 24 (01-20-19 @ 03:00) (14 - 36)  SpO2: 95% (01-20-19 @ 03:00) (92% - 98%)    DIET/FLUIDS: dextrose 5%. 1000 milliLiter(s) IV Continuous <Continuous>  dextrose 5%. 1000 milliLiter(s) IV Continuous <Continuous>  potassium phosphate IVPB 30 milliMole(s) IV Intermittent once  sodium chloride 0.9% Bolus 250 milliLiter(s) IV Bolus once  sodium chloride 0.9% Bolus 250 milliLiter(s) IV Bolus once  sodium chloride 0.9%. 1000 milliLiter(s) IV Continuous <Continuous>                                                                               DRAINS:   01-18-19 @ 07:01  -  01-19-19 @ 07:00  --------------------------------------------------------  OUT: 35 mL      URINE:   01-18-19 @ 07:01  -  01-19-19 @ 07:00  --------------------------------------------------------  OUT: 580 mL     Indwelling Urethral Catheter:     Connect To:  Straight Drainage/[place]    Indication:  Urinary Retention / Obstruction (01-20-19 @ 04:57)    GI proph:  pantoprazole    Tablet 40 milliGRAM(s) Oral before breakfast    AC/ proph: heparin  Injectable 5000 Unit(s) SubCutaneous every 8 hours    PHYSICAL EXAM:  GENERAL: NAD, well-appearing  CHEST/LUNG: Clear to auscultation bilaterally  HEART: Regular rate and rhythm  ABDOMEN: Soft, Nontender, Nondistended;   EXTREMITIES:  No clubbing, cyanosis, or edema      LABS  Labs:  CAPILLARY BLOOD GLUCOSE      POCT Blood Glucose.: 160 mg/dL (20 Jan 2019 04:53)  POCT Blood Glucose.: 182 mg/dL (20 Jan 2019 03:43)  POCT Blood Glucose.: 176 mg/dL (20 Jan 2019 02:56)  POCT Blood Glucose.: 175 mg/dL (20 Jan 2019 01:59)  POCT Blood Glucose.: 161 mg/dL (20 Jan 2019 00:45)  POCT Blood Glucose.: 137 mg/dL (20 Jan 2019 00:16)  POCT Blood Glucose.: 128 mg/dL (19 Jan 2019 23:04)  POCT Blood Glucose.: 116 mg/dL (19 Jan 2019 22:04)  POCT Blood Glucose.: 158 mg/dL (19 Jan 2019 20:47)  POCT Blood Glucose.: 162 mg/dL (19 Jan 2019 19:53)  POCT Blood Glucose.: 167 mg/dL (19 Jan 2019 18:55)  POCT Blood Glucose.: 137 mg/dL (19 Jan 2019 18:11)  POCT Blood Glucose.: 162 mg/dL (19 Jan 2019 17:02)  POCT Blood Glucose.: 181 mg/dL (19 Jan 2019 16:06)  POCT Blood Glucose.: 265 mg/dL (19 Jan 2019 14:55)  POCT Blood Glucose.: 330 mg/dL (19 Jan 2019 14:00)  POCT Blood Glucose.: 293 mg/dL (19 Jan 2019 12:46)  POCT Blood Glucose.: 259 mg/dL (19 Jan 2019 10:06)  POCT Blood Glucose.: 263 mg/dL (19 Jan 2019 08:33)  POCT Blood Glucose.: 274 mg/dL (19 Jan 2019 07:52)  POCT Blood Glucose.: 274 mg/dL (19 Jan 2019 06:19)                          8.3    11.06 )-----------( 179      ( 20 Jan 2019 00:36 )             27.8         01-20    135  |  98  |  15  ----------------------------<  144<H>  4.7   |  20  |  1.0      Calcium, Total Serum: 8.6 mg/dL (01-20-19 @ 00:36)      LFTs:     Lactate, Blood: 1.1 mmol/L (01-17-19 @ 17:00)      Coags:     12.30  ----< 1.07    ( 20 Jan 2019 00:36 )     32.8          RADIOLOGY & ADDITIONAL TESTS:  < from: Xray Chest 1 View- PORTABLE-Routine (01.19.19 @ 05:42) >  Impression:      No radiographic evidence of acute cardiopulmonary disease.    < end of copied text >  < from: Xray Chest 1 View- PORTABLE-Routine (01.19.19 @ 05:42) >  Impression:      No radiographic evidence of acute cardiopulmonary disease.    < end of copied text >

## 2019-01-20 NOTE — PROGRESS NOTE ADULT - SUBJECTIVE AND OBJECTIVE BOX
Patient is a 73y old  Female who presents with a chief complaint of Abdominal pain (20 Jan 2019 05:06)   found to have SBO and t-point in large chronically incarcerated incisional hernia  s/p  surgery  on  1/18 and  then tfxed  to  ICU  and  doing better   tolerated   diet  well  without N/V  and  walking as well       Pt is seen and examined  pt is awake out of bed to chair  pt seems comfortable and denies any complaints at this time          ROS:  Negative except for:    MEDICATIONS  (STANDING):  acetaminophen   Tablet .. 650 milliGRAM(s) Oral every 6 hours  amiodarone    Tablet 200 milliGRAM(s) Oral two times a day  carvedilol 3.125 milliGRAM(s) Oral every 12 hours  chlorhexidine 4% Liquid 1 Application(s) Topical <User Schedule>  docusate sodium 100 milliGRAM(s) Oral three times a day  heparin  Injectable 5000 Unit(s) SubCutaneous every 8 hours  insulin glargine Injectable (LANTUS) 67 Unit(s) SubCutaneous at bedtime  insulin Infusion 1 Unit(s)/Hr (1 mL/Hr) IV Continuous <Continuous>  pantoprazole    Tablet 40 milliGRAM(s) Oral before breakfast    MEDICATIONS  (PRN):  ondansetron Injectable 4 milliGRAM(s) IV Push every 8 hours PRN Nausea and/or Vomiting  oxyCODONE    IR 5 milliGRAM(s) Oral every 6 hours PRN Moderate Pain (4 - 6)      Allergies    ciprofloxacin (Hives)  penicillin (Rash)    Intolerances        Vital Signs Last 24 Hrs  T(C): 36.6 (20 Jan 2019 12:00), Max: 36.8 (19 Jan 2019 20:00)  T(F): 97.9 (20 Jan 2019 12:00), Max: 98.2 (19 Jan 2019 20:00)  HR: 86 (20 Jan 2019 16:00) (80 - 94)  BP: 119/74 (20 Jan 2019 16:00) (77/41 - 140/72)  BP(mean): 103 (20 Jan 2019 16:00) (46 - 103)  RR: 26 (20 Jan 2019 16:00) (15 - 36)  SpO2: 97% (20 Jan 2019 16:00) (94% - 99%)    PHYSICAL EXAM  General: adult in NAD  HEENT: clear oropharynx, anicteric sclera, pink conjunctiva  Neck: supple  CV: normal S1/S2 with no murmur rubs or gallops  Lungs: positive air movement b/l ant lungs,clear to auscultation, no wheezes, no rales  Abdomen: soft non-tender non-distended, no hepatosplenomegaly  Ext: no clubbing cyanosis or edema  Skin: no rashes and no petechiae  Neuro: alert and oriented X 4, no focal deficits  LABS:                          8.3    11.06 )-----------( 179      ( 20 Jan 2019 00:36 )             27.8         Mean Cell Volume : 88.5 fL  Mean Cell Hemoglobin : 26.4 pg  Mean Cell Hemoglobin Concentration : 29.9 g/dL  Auto Neutrophil # : x  Auto Lymphocyte # : x  Auto Monocyte # : x  Auto Eosinophil # : x  Auto Basophil # : x  Auto Neutrophil % : x  Auto Lymphocyte % : x  Auto Monocyte % : x  Auto Eosinophil % : x  Auto Basophil % : x    Serial CBC's  01-20 @ 00:36  Hct-27.8 / Hgb-8.3 / Plat-179 / RBC-3.14 / WBC-11.06          Serial CBC's  01-18 @ 22:54  Hct-27.2 / Hgb-8.0 / Plat-158 / RBC-3.05 / WBC-11.31          Serial CBC's  01-18 @ 11:00  Hct-27.5 / Hgb-8.2 / Plat-152 / RBC-3.16 / WBC-8.43          Serial CBC's  01-18 @ 04:40  Hct-28.0 / Hgb-8.4 / Plat-147 / RBC-3.23 / WBC-7.51          Serial CBC's  01-17 @ 23:35  Hct-23.4 / Hgb-6.8 / Plat-155 / RBC-2.70 / WBC-9.17            01-20    135  |  98  |  15  ----------------------------<  144<H>  4.7   |  20  |  1.0    Ca    8.6      20 Jan 2019 00:36  Phos  2.9     01-20  Mg     2.0     01-20        PT/INR - ( 20 Jan 2019 00:36 )   PT: 12.30 sec;   INR: 1.07 ratio         PTT - ( 20 Jan 2019 00:36 )  PTT:32.8 sec    Platelet Count - Automated: 179 K/uL (01-20-19 @ 00:36)  Hemoglobin: 8.3 g/dL (01-20-19 @ 00:36)  WBC Count: 11.06 K/uL (01-20-19 @ 00:36)  Hematocrit: 27.8 % (01-20-19 @ 00:36)  Hemoglobin: 8.0 g/dL (01-18-19 @ 22:54)  WBC Count: 11.31 K/uL (01-18-19 @ 22:54)  Hematocrit: 27.2 % (01-18-19 @ 22:54)  Platelet Count - Automated: 158 K/uL (01-18-19 @ 22:54)  Hematocrit: 27.5 % (01-18-19 @ 11:00)  Platelet Count - Automated: 152 K/uL (01-18-19 @ 11:00)  Hemoglobin: 8.2 g/dL (01-18-19 @ 11:00)  WBC Count: 8.43 K/uL (01-18-19 @ 11:00)  Platelet Count - Automated: 147 K/uL (01-18-19 @ 04:40)  Hemoglobin: 8.4 g/dL (01-18-19 @ 04:40)  WBC Count: 7.51 K/uL (01-18-19 @ 04:40)  Hematocrit: 28.0 % (01-18-19 @ 04:40)  Hematocrit: 23.4 % (01-17-19 @ 23:35)  Platelet Count - Automated: 155 K/uL (01-17-19 @ 23:35)  Hemoglobin: 6.8 g/dL (01-17-19 @ 23:35)  WBC Count: 9.17 K/uL (01-17-19 @ 23:35)  Hemoglobin: 8.1 g/dL (01-17-19 @ 18:30)  WBC Count: 12.99 K/uL (01-17-19 @ 18:30)  Platelet Count - Automated: 232 K/uL (01-17-19 @ 18:30)  Hematocrit: 28.1 % (01-17-19 @ 18:30)  Hematocrit: 28.3 % (01-16-19 @ 23:51)  WBC Count: 8.96 K/uL (01-16-19 @ 23:51)  Platelet Count - Automated: 185 K/uL (01-16-19 @ 23:51)  Hemoglobin: 8.1 g/dL (01-16-19 @ 23:51)  Hematocrit: 28.1 % (01-15-19 @ 23:54)  Platelet Count - Automated: 193 K/uL (01-15-19 @ 23:54)  Hemoglobin: 8.4 g/dL (01-15-19 @ 23:54)  WBC Count: 7.66 K/uL (01-15-19 @ 23:54)  WBC Count: 10.39 K/uL (01-15-19 @ 10:30)  Hematocrit: 31.7 % (01-15-19 @ 10:30)  Platelet Count - Automated: 252 K/uL (01-15-19 @ 10:30)  Hemoglobin: 9.2 g/dL (01-15-19 @ 10:30)                BLOOD SMEAR INTERPRETATION:       RADIOLOGY & ADDITIONAL STUDIES:

## 2019-01-20 NOTE — PROGRESS NOTE ADULT - ASSESSMENT
Assesment :  1-  s/p  SBO  sec  to  abd  hernia   recovering  from surgey  well   2-Anemia    stable  at  8.3     no  need  for  blood  tfx.   3-H/O Ovarian cancer  s/p  surgery   and  chemotherapy   in [place]     Plan     iv hydration   monitor electrolytes  start on procrit 40,000 unit q weekly  for  hgb < 10  gm.  transfuse to keep hb >7  cont other supportive care.  will need MRCP to evaluate pancreatic lesion, can be done as outpt when acute issue is resolved.  cont other supportive care as per Sx.  D/W  pt  and  family

## 2019-01-21 LAB
ANION GAP SERPL CALC-SCNC: 16 MMOL/L — HIGH (ref 7–14)
APTT BLD: 35 SEC — SIGNIFICANT CHANGE UP (ref 27–39.2)
BUN SERPL-MCNC: 17 MG/DL — SIGNIFICANT CHANGE UP (ref 10–20)
CALCIUM SERPL-MCNC: 8.5 MG/DL — SIGNIFICANT CHANGE UP (ref 8.5–10.1)
CHLORIDE SERPL-SCNC: 102 MMOL/L — SIGNIFICANT CHANGE UP (ref 98–110)
CO2 SERPL-SCNC: 18 MMOL/L — SIGNIFICANT CHANGE UP (ref 17–32)
CREAT SERPL-MCNC: 0.9 MG/DL — SIGNIFICANT CHANGE UP (ref 0.7–1.5)
GLUCOSE BLDC GLUCOMTR-MCNC: 277 MG/DL — HIGH (ref 70–99)
GLUCOSE BLDC GLUCOMTR-MCNC: 285 MG/DL — HIGH (ref 70–99)
GLUCOSE BLDC GLUCOMTR-MCNC: 293 MG/DL — HIGH (ref 70–99)
GLUCOSE SERPL-MCNC: 131 MG/DL — HIGH (ref 70–99)
HCT VFR BLD CALC: 26.6 % — LOW (ref 37–47)
HCT VFR BLD CALC: 29.7 % — LOW (ref 37–47)
HGB BLD-MCNC: 7.9 G/DL — LOW (ref 12–16)
HGB BLD-MCNC: 8.7 G/DL — LOW (ref 12–16)
INR BLD: 1.15 RATIO — SIGNIFICANT CHANGE UP (ref 0.65–1.3)
MAGNESIUM SERPL-MCNC: 1.8 MG/DL — SIGNIFICANT CHANGE UP (ref 1.8–2.4)
MCHC RBC-ENTMCNC: 25.8 PG — LOW (ref 27–31)
MCHC RBC-ENTMCNC: 26.2 PG — LOW (ref 27–31)
MCHC RBC-ENTMCNC: 29.3 G/DL — LOW (ref 32–37)
MCHC RBC-ENTMCNC: 29.7 G/DL — LOW (ref 32–37)
MCV RBC AUTO: 88.1 FL — SIGNIFICANT CHANGE UP (ref 81–99)
MCV RBC AUTO: 88.4 FL — SIGNIFICANT CHANGE UP (ref 81–99)
NRBC # BLD: 0 /100 WBCS — SIGNIFICANT CHANGE UP (ref 0–0)
NRBC # BLD: 0 /100 WBCS — SIGNIFICANT CHANGE UP (ref 0–0)
PHOSPHATE SERPL-MCNC: 2.4 MG/DL — SIGNIFICANT CHANGE UP (ref 2.1–4.9)
PLATELET # BLD AUTO: 162 K/UL — SIGNIFICANT CHANGE UP (ref 130–400)
PLATELET # BLD AUTO: 179 K/UL — SIGNIFICANT CHANGE UP (ref 130–400)
POTASSIUM SERPL-MCNC: 4.7 MMOL/L — SIGNIFICANT CHANGE UP (ref 3.5–5)
POTASSIUM SERPL-SCNC: 4.7 MMOL/L — SIGNIFICANT CHANGE UP (ref 3.5–5)
PROTHROM AB SERPL-ACNC: 13.2 SEC — HIGH (ref 9.95–12.87)
RBC # BLD: 3.01 M/UL — LOW (ref 4.2–5.4)
RBC # BLD: 3.37 M/UL — LOW (ref 4.2–5.4)
RBC # FLD: 17.2 % — HIGH (ref 11.5–14.5)
RBC # FLD: 17.5 % — HIGH (ref 11.5–14.5)
SODIUM SERPL-SCNC: 136 MMOL/L — SIGNIFICANT CHANGE UP (ref 135–146)
WBC # BLD: 7.76 K/UL — SIGNIFICANT CHANGE UP (ref 4.8–10.8)
WBC # BLD: 8.57 K/UL — SIGNIFICANT CHANGE UP (ref 4.8–10.8)
WBC # FLD AUTO: 7.76 K/UL — SIGNIFICANT CHANGE UP (ref 4.8–10.8)
WBC # FLD AUTO: 8.57 K/UL — SIGNIFICANT CHANGE UP (ref 4.8–10.8)

## 2019-01-21 PROCEDURE — 99291 CRITICAL CARE FIRST HOUR: CPT | Mod: 24

## 2019-01-21 PROCEDURE — 99024 POSTOP FOLLOW-UP VISIT: CPT

## 2019-01-21 RX ORDER — SODIUM CHLORIDE 0.65 %
1 AEROSOL, SPRAY (ML) NASAL EVERY 4 HOURS
Qty: 0 | Refills: 0 | Status: DISCONTINUED | OUTPATIENT
Start: 2019-01-21 | End: 2019-01-23

## 2019-01-21 RX ORDER — INSULIN LISPRO 100/ML
VIAL (ML) SUBCUTANEOUS
Qty: 0 | Refills: 0 | Status: DISCONTINUED | OUTPATIENT
Start: 2019-01-21 | End: 2019-01-23

## 2019-01-21 RX ORDER — MAGNESIUM SULFATE 500 MG/ML
2 VIAL (ML) INJECTION ONCE
Qty: 0 | Refills: 0 | Status: COMPLETED | OUTPATIENT
Start: 2019-01-21 | End: 2019-01-21

## 2019-01-21 RX ORDER — DEXTROSE 50 % IN WATER 50 %
12.5 SYRINGE (ML) INTRAVENOUS ONCE
Qty: 0 | Refills: 0 | Status: DISCONTINUED | OUTPATIENT
Start: 2019-01-21 | End: 2019-01-23

## 2019-01-21 RX ORDER — DEXTROSE 50 % IN WATER 50 %
15 SYRINGE (ML) INTRAVENOUS ONCE
Qty: 0 | Refills: 0 | Status: DISCONTINUED | OUTPATIENT
Start: 2019-01-21 | End: 2019-01-23

## 2019-01-21 RX ORDER — FUROSEMIDE 40 MG
20 TABLET ORAL DAILY
Qty: 0 | Refills: 0 | Status: DISCONTINUED | OUTPATIENT
Start: 2019-01-21 | End: 2019-01-22

## 2019-01-21 RX ORDER — DEXTROSE 50 % IN WATER 50 %
25 SYRINGE (ML) INTRAVENOUS ONCE
Qty: 0 | Refills: 0 | Status: DISCONTINUED | OUTPATIENT
Start: 2019-01-21 | End: 2019-01-23

## 2019-01-21 RX ORDER — DEXTROSE 50 % IN WATER 50 %
15 SYRINGE (ML) INTRAVENOUS ONCE
Qty: 0 | Refills: 0 | Status: DISCONTINUED | OUTPATIENT
Start: 2019-01-21 | End: 2019-01-21

## 2019-01-21 RX ORDER — SODIUM CHLORIDE 9 MG/ML
1000 INJECTION, SOLUTION INTRAVENOUS
Qty: 0 | Refills: 0 | Status: DISCONTINUED | OUTPATIENT
Start: 2019-01-21 | End: 2019-01-21

## 2019-01-21 RX ORDER — METFORMIN HYDROCHLORIDE 850 MG/1
1000 TABLET ORAL
Qty: 0 | Refills: 0 | Status: DISCONTINUED | OUTPATIENT
Start: 2019-01-21 | End: 2019-01-23

## 2019-01-21 RX ORDER — DEXTROSE 50 % IN WATER 50 %
12.5 SYRINGE (ML) INTRAVENOUS ONCE
Qty: 0 | Refills: 0 | Status: DISCONTINUED | OUTPATIENT
Start: 2019-01-21 | End: 2019-01-21

## 2019-01-21 RX ORDER — GLUCAGON INJECTION, SOLUTION 0.5 MG/.1ML
1 INJECTION, SOLUTION SUBCUTANEOUS ONCE
Qty: 0 | Refills: 0 | Status: DISCONTINUED | OUTPATIENT
Start: 2019-01-21 | End: 2019-01-21

## 2019-01-21 RX ORDER — METFORMIN HYDROCHLORIDE 850 MG/1
1000 TABLET ORAL
Qty: 0 | Refills: 0 | Status: DISCONTINUED | OUTPATIENT
Start: 2019-01-21 | End: 2019-01-21

## 2019-01-21 RX ORDER — LISINOPRIL 2.5 MG/1
10 TABLET ORAL DAILY
Qty: 0 | Refills: 0 | Status: DISCONTINUED | OUTPATIENT
Start: 2019-01-21 | End: 2019-01-23

## 2019-01-21 RX ORDER — INSULIN HUMAN 100 [IU]/ML
INJECTION, SOLUTION SUBCUTANEOUS EVERY 6 HOURS
Qty: 0 | Refills: 0 | Status: DISCONTINUED | OUTPATIENT
Start: 2019-01-21 | End: 2019-01-21

## 2019-01-21 RX ORDER — GLUCAGON INJECTION, SOLUTION 0.5 MG/.1ML
1 INJECTION, SOLUTION SUBCUTANEOUS ONCE
Qty: 0 | Refills: 0 | Status: DISCONTINUED | OUTPATIENT
Start: 2019-01-21 | End: 2019-01-23

## 2019-01-21 RX ORDER — SODIUM CHLORIDE 9 MG/ML
1000 INJECTION, SOLUTION INTRAVENOUS
Qty: 0 | Refills: 0 | Status: DISCONTINUED | OUTPATIENT
Start: 2019-01-21 | End: 2019-01-23

## 2019-01-21 RX ADMIN — Medication 650 MILLIGRAM(S): at 17:25

## 2019-01-21 RX ADMIN — CARVEDILOL PHOSPHATE 3.12 MILLIGRAM(S): 80 CAPSULE, EXTENDED RELEASE ORAL at 17:25

## 2019-01-21 RX ADMIN — HEPARIN SODIUM 5000 UNIT(S): 5000 INJECTION INTRAVENOUS; SUBCUTANEOUS at 21:54

## 2019-01-21 RX ADMIN — Medication 650 MILLIGRAM(S): at 11:15

## 2019-01-21 RX ADMIN — Medication 100 MILLIGRAM(S): at 13:17

## 2019-01-21 RX ADMIN — AMIODARONE HYDROCHLORIDE 200 MILLIGRAM(S): 400 TABLET ORAL at 06:12

## 2019-01-21 RX ADMIN — Medication 100 MILLIGRAM(S): at 06:12

## 2019-01-21 RX ADMIN — CARVEDILOL PHOSPHATE 3.12 MILLIGRAM(S): 80 CAPSULE, EXTENDED RELEASE ORAL at 06:12

## 2019-01-21 RX ADMIN — INSULIN HUMAN 6: 100 INJECTION, SOLUTION SUBCUTANEOUS at 12:12

## 2019-01-21 RX ADMIN — HEPARIN SODIUM 5000 UNIT(S): 5000 INJECTION INTRAVENOUS; SUBCUTANEOUS at 13:17

## 2019-01-21 RX ADMIN — Medication 50 GRAM(S): at 06:11

## 2019-01-21 RX ADMIN — AMIODARONE HYDROCHLORIDE 200 MILLIGRAM(S): 400 TABLET ORAL at 17:25

## 2019-01-21 RX ADMIN — PANTOPRAZOLE SODIUM 40 MILLIGRAM(S): 20 TABLET, DELAYED RELEASE ORAL at 06:12

## 2019-01-21 RX ADMIN — Medication 3: at 17:25

## 2019-01-21 RX ADMIN — Medication 650 MILLIGRAM(S): at 01:01

## 2019-01-21 RX ADMIN — CHLORHEXIDINE GLUCONATE 1 APPLICATION(S): 213 SOLUTION TOPICAL at 06:13

## 2019-01-21 RX ADMIN — Medication 650 MILLIGRAM(S): at 11:20

## 2019-01-21 RX ADMIN — Medication 650 MILLIGRAM(S): at 06:14

## 2019-01-21 RX ADMIN — HEPARIN SODIUM 5000 UNIT(S): 5000 INJECTION INTRAVENOUS; SUBCUTANEOUS at 06:12

## 2019-01-21 RX ADMIN — INSULIN GLARGINE 67 UNIT(S): 100 INJECTION, SOLUTION SUBCUTANEOUS at 21:54

## 2019-01-21 NOTE — CHART NOTE - NSCHARTNOTEFT_GEN_A_CORE
SICU Transfer Note:    Transfer from: SICU  Transfer to:  (X) Surgery    (  ) Telemetry    (  ) Medicine    (  ) Palliative    (  ) Stroke Unit    (  ) _______________      SICU COURSE:  73y Female w/ CHF EF 35, DM, chronic anemia (Hg ~8-9, gets procrit), active gastric cancer on chemo at [facility] Dr. Pratt, hx of ovarian ca/ s/p hysterectomy and oophorectomy 2 years ago presents for partial sbo 2/2 to  ventral hernia;  s/p repair w/ mesh and removal of adhesions after optimization by cardio for CHF.   Pt admitted to the SICU for concern of tacchy 120s and temp 100.9.  Pt received 2 doses of clindamycin and then dc, since pt was never febrile again.  Pt started to have paroxysxymal of ectopic pacs v. afib which resolved on their own.  Spoke to Dr. Lara her cardiologist- pt was started on amiodarone. Pt NOT anticoagulated-- needs to be follow up with Dr. Lara.  FS control.         PAST MEDICAL & SURGICAL HISTORY:  Bronchitis  Diabetes mellitus, type 2    Allergies    ciprofloxacin (Hives)  penicillin (Rash)    Intolerances      MEDICATIONS  (STANDING):  acetaminophen   Tablet .. 650 milliGRAM(s) Oral every 6 hours  amiodarone    Tablet 200 milliGRAM(s) Oral two times a day  carvedilol 3.125 milliGRAM(s) Oral every 12 hours  chlorhexidine 4% Liquid 1 Application(s) Topical <User Schedule>  dextrose 50% Injectable 25 Gram(s) IV Push once  dextrose 50% Injectable 25 Gram(s) IV Push once  docusate sodium 100 milliGRAM(s) Oral three times a day  heparin  Injectable 5000 Unit(s) SubCutaneous every 8 hours  insulin glargine Injectable (LANTUS) 67 Unit(s) SubCutaneous at bedtime  insulin regular  human corrective regimen sliding scale   IV Push every 6 hours  pantoprazole    Tablet 40 milliGRAM(s) Oral before breakfast    MEDICATIONS  (PRN):  ondansetron Injectable 4 milliGRAM(s) IV Push every 8 hours PRN Nausea and/or Vomiting        Vital Signs Last 24 Hrs  T(C): 36.3 (21 Jan 2019 08:00), Max: 36.8 (21 Jan 2019 00:00)  T(F): 97.3 (21 Jan 2019 08:00), Max: 98.2 (21 Jan 2019 00:00)  HR: 78 (21 Jan 2019 10:00) (66 - 90)  BP: 110/52 (21 Jan 2019 10:00) (83/37 - 119/74)  BP(mean): 80 (21 Jan 2019 08:00) (48 - 103)  RR: 27 (21 Jan 2019 10:00) (23 - 38)  SpO2: 98% (21 Jan 2019 10:00) (95% - 99%)  I&O's Summary    20 Jan 2019 07:01  -  21 Jan 2019 07:00  --------------------------------------------------------  IN: 407 mL / OUT: 630 mL / NET: -223 mL    21 Jan 2019 07:01  -  21 Jan 2019 12:22  --------------------------------------------------------  IN: 340 mL / OUT: 270 mL / NET: 70 mL        LABS                                            7.9                   Neurophils% (auto):   x      (01-20 @ 23:57):    7.76 )-----------(162          Lymphocytes% (auto):  x                                             26.6                   Eosinphils% (auto):   x        Manual%: Neutrophils x    ; Lymphocytes x    ; Eosinophils x    ; Bands%: x    ; Blasts x                                    136    |  102    |  17                  Calcium: 8.5   / iCa: x      (01-20 @ 23:57)    ----------------------------<  131       Magnesium: 1.8                              4.7     |  18     |  0.9              Phosphorous: 2.4        ( 01-20 @ 23:57 )   PT: 13.20 sec;   INR: 1.15 ratio  aPTT: 35.0 sec        ASSESSMENT/PLAN: 73yFemale      Neurologic: A+O, follows commands.  pain control by tylenol.  Oxycodone PRN, but pt has not used.     Respiratory: on to room air    Cardiovascular: Hx CHF (EF 35), hypotensive overnight, resolved in am, NICOM non responsive.   -d/c'd metoprolol and started home carvedilol, holding lasix due to labile, on PO Amiodarone for abnormal episodes of rhythm afib v. pacs.  Not anticoagulated-- need to follow up with cardio.   cardio- dr. lara called for follow up on pts rhythm.     Gastrointestinal/Nutrition: partial sbo 2/2 to incarcerated ventral hernia;  s/p repair w/ mesh and removal of adhesions.  - diet advanced.      Renal/Genitourinary:  Olson dc'd 1/19, voided, but w/ low urine output, bladder scan w/ 50 cc, patient bolused 250cc x 2, and olson reinserted 1/20. UOP: 20-35cc/h.  Olson should be dc 1/21 today and monitor  -Strict I&O  -Replete electrolytes PRN     Hematologic: chronic anemia baseline 8-9-- procrit 40,000 unit qweekly- last 1/16.     Today hg 7.9 follow up with heme.   - Chemoprophylaxis w/ Heparin SubQ 5000u q8h     Infectious Disease: No Acute Dx, afebrile, WBC:     Lines/Tubes:  2 peripheral IV, Olson to be discontinued today.    Endocrine: Hx of DM, Off insulin Drip, started Home Lantus 67u qHS, takes Metformin 1000mg BID, Glipizide 10mg at home both Held. Monitor FS AC/HS.    Disposition: Once Blood Glucose controlled possible downgrade to Floor.         For Follow-Up:  Labs 23:30  cardio- dr. lara in regards to need to anticoagulate?  Remove olson and make sure pt voids.  FS.  Sign out given to Chang

## 2019-01-21 NOTE — CONSULT NOTE ADULT - ASSESSMENT
IMPRESSION: Rehab of cardiac debility, s/p SBO/ abd hernia repair    PRECAUTIONS: [ x ] Cardiac  [  ] Respiratory  [  ] Seizures [  ] Contact Isolation  [  ] Droplet Isolation  [x  ] Other- no lifting    Weight Bearing Status: WBAT    RECOMMENDATION:    Out of Bed to Chair     DVT/Decubiti Prophylaxis    REHAB PLAN:     [x  ] Bedside P/T 3-5 times a week   [   ]   Bedside O/T  2-3 times a week             [   ] No Rehab Therapy Indicated                   [   ]  Speech Therapy   Conditioning/ROM                                    ADL  Bed Mobility                                               Conditioning/ROM  Transfers                                                     Bed Mobility  Sitting /Standing Balance                         Transfers                                        Gait Training                                               Sitting/Standing Balance  Stair Training [   ]Applicable                    Home equipment Eval                                                                        Splinting  [   ] Only      GOALS:   ADL   [  x ]   Independent                    Transfers  [x   ] Independent                          Ambulation  [ x  ] Independent     [  x  ] With device                            [   ]  CG                                                         [   ]  CG                                                                  [   ] CG                            [    ] Min A                                                   [   ] Min A                                                              [   ] Min  A          DISCHARGE PLAN:   [   ]  Good candidate for Intensive Rehabilitation/Hospital based-4A [facility]                                             Will tolerate 3hrs Intensive Rehab Daily                                       [    ]  Short Term Rehab in Skilled Nursing Facility                                       [    ]  Home with Outpatient or VN services                                         [ xx   ]  Possible Candidate for Intensive Hospital based Rehab. Will reeval after PT

## 2019-01-21 NOTE — PROGRESS NOTE ADULT - ASSESSMENT
Plan:  1.Bl sugar montioring   2.Fu with Dr Browne- cardiologist   3.Encourage ambulation   4.Encourage incentive spirometry   5.DVT and GI prophylaxis

## 2019-01-21 NOTE — PROGRESS NOTE ADULT - ASSESSMENT
ASSESSMENT:  73y Female w/ CHF EF 35, DM, chronic anemia (Hg ~8-9, gets procrit), active gastric cancer on chemo at [facility] Dr. Pratt, hx of ovarian ca/ s/p hysterectomy and oophorectomy 2 years ago presents for partial sbo 2/2 to  ventral hernia;  s/p repair w/ mesh and removal of adhesions    PLAN:     Neurologic: A+O, follows commands.  pain control switched to oral regimen, given 1 dose of morphine overnight for pain    Respiratory: on to room air    Cardiovascular: Hx CHF (EF 35), hypotensive overnight, resolved in am, NICOM non responsive.   -home med: Benazepril 10mg and lasix;; Now on metoprolol and amio (switched from drip to oral)  cardio- dr. lara called for follow up on pts rhythm.     Gastrointestinal/Nutrition: partial sbo 2/2 to incarcerated ventral hernia;  s/p repair w/ mesh and removal of adhesions. started on clear    Renal/Genitourinary: Olson dc'd yesterday, voided, but w/ low urine output, bladder scan w/ 50 cc, patient bolused 250cc x 2, and olson reinserted.   -Strict I&O  -Replete electrolytes PRN    Hematologic: chronic anemia baseline 8-9-- procrit 40,000 unit qweekly- last 1/16  -SubQ Heparin    Infectious Disease: remains afebrile, no dx or abx    Lines/Tubes:  2 peripheral IV    Endocrine: Hx of DM,  insulin drip started 2/2 poor glucose control, will attempt to titrate down to ISS.    Disposition: Continue ICU care. ASSESSMENT:  73y Female w/ CHF EF 35, DM, chronic anemia (Hg ~8-9, gets procrit), active gastric cancer on chemo at [facility] Dr. Pratt, hx of ovarian ca/ s/p hysterectomy and oophorectomy 2 years ago presents for partial sbo 2/2 to  ventral hernia;  s/p repair w/ mesh and removal of adhesions    PLAN:     Neurologic: A+O, follows commands.  pain control switched to oral regimen, given 1 dose of morphine overnight for pain    Respiratory: on to room air    Cardiovascular: Hx CHF (EF 35), hypotensive overnight, resolved in am, NICOM non responsive.   -d/c'd metoprolol and started home carvedilol, holding lasix due to labile, on PO Amiodarone  cardio- dr. lara called for follow up on pts rhythm.     Gastrointestinal/Nutrition: partial sbo 2/2 to incarcerated ventral hernia;  s/p repair w/ mesh and removal of adhesions.  - advanced to carb const full liquid today, PPI, colace    Renal/Genitourinary:  Olson dc'd 1/19, voided, but w/ low urine output, bladder scan w/ 50 cc, patient bolused 250cc x 2, and olson reinserted 1/20. UOP: 20-35cc/h  -Strict I&O  -Replete electrolytes PRN    Hematologic: chronic anemia baseline 8-9-- procrit 40,000 unit qweekly- last 1/16  - Chemoprophylaxis w/ Heparin SubQ 5000u q8h     Infectious Disease: No Acute Dx, afebrile, WBC:     Lines/Tubes:  2 peripheral IV, Olson    Endocrine: Hx of DM, Off insulin Drip, started Home Lantus 67u qHS, takes Metformin 1000mg BID, Glipizide 10mg at home both Held. Monitor FS AC/HS.    Disposition: Once Blood Glucose controlled possible downgrade to Floor. ASSESSMENT:  73y Female w/ CHF EF 35, DM, chronic anemia (Hg ~8-9, gets procrit), active gastric cancer on chemo at [facility] Dr. Pratt, hx of ovarian ca/ s/p hysterectomy and oophorectomy 2 years ago presents for partial sbo 2/2 to  ventral hernia;  s/p repair w/ mesh and removal of adhesions    PLAN:     Neurologic: A+O, follows commands.  pain control switched to oral regimen, given 1 dose of morphine overnight for pain    Respiratory: on to room air    Cardiovascular: Hx CHF (EF 35), hypotensive overnight, resolved in am, NICOM non responsive.   -d/c'd metoprolol and started home carvedilol, holding lasix due to labile, on PO Amiodarone  cardio- dr. lara called for follow up on pts rhythm.     Gastrointestinal/Nutrition: partial sbo 2/2 to incarcerated ventral hernia;  s/p repair w/ mesh and removal of adhesions.  - advanced to carb const full liquid today, PPI, colace    Renal/Genitourinary:  Olson dc'd 1/19, voided, but w/ low urine output, bladder scan w/ 50 cc, patient bolused 250cc x 2, and olson reinserted 1/20. UOP: 20-35cc/h.  Olson should be dc 1/21  -Strict I&O  -Replete electrolytes PRN     Hematologic: chronic anemia baseline 8-9-- procrit 40,000 unit qweekly- last 1/16  - Chemoprophylaxis w/ Heparin SubQ 5000u q8h     Infectious Disease: No Acute Dx, afebrile, WBC:     Lines/Tubes:  2 peripheral IV, Olson to be discontinued today.    Endocrine: Hx of DM, Off insulin Drip, started Home Lantus 67u qHS, takes Metformin 1000mg BID, Glipizide 10mg at home both Held. Monitor FS AC/HS.    Disposition: Once Blood Glucose controlled possible downgrade to Floor. ASSESSMENT:  73y Female w/ CHF EF 35, DM, chronic anemia (Hg ~8-9, gets procrit), active gastric cancer on chemo at [facility] Dr. Pratt, hx of ovarian ca/ s/p hysterectomy and oophorectomy 2 years ago presents for partial sbo 2/2 to  ventral hernia;  s/p repair w/ mesh and removal of adhesions    PLAN:     Neurologic: A+O, follows commands.  pain control switched to oral regimen, given 1 dose of morphine overnight for pain    Respiratory: on to room air    Cardiovascular: Hx CHF (EF 35), hypotensive overnight, resolved in am, NICOM non responsive.   -d/c'd metoprolol and started home carvedilol, holding lasix due to labile, on PO Amiodarone  cardio- dr. lara called for follow up on pts rhythm.     Gastrointestinal/Nutrition: partial sbo 2/2 to incarcerated ventral hernia;  s/p repair w/ mesh and removal of adhesions.  - advanced diet PPI, colace    Renal/Genitourinary:  Olson dc'd 1/19, voided, but w/ low urine output, bladder scan w/ 50 cc, patient bolused 250cc x 2, and olson reinserted 1/20. UOP: 20-35cc/h.  Olson should be dc 1/21  -Strict I&O  -Replete electrolytes PRN     Hematologic: chronic anemia baseline 8-9-- procrit 40,000 unit qweekly- last 1/16  - Chemoprophylaxis w/ Heparin SubQ 5000u q8h     Infectious Disease: No Acute Dx, afebrile, WBC:     Lines/Tubes:  2 peripheral IV, Olson to be discontinued today.    Endocrine: Hx of DM, Off insulin Drip, started Home Lantus 67u qHS, takes Metformin 1000mg BID, Glipizide 10mg at home both Held. Monitor FS AC/HS.    Disposition: Once Blood Glucose controlled possible downgrade to Floor.

## 2019-01-21 NOTE — PROGRESS NOTE ADULT - SUBJECTIVE AND OBJECTIVE BOX
JILLIAN SEE  [id number]  73y Female    Indication for ICU admission: high risk pt w/ partial sbo 2/2 to incarcerated ventral hernia;  s/p repair w/ mesh and removal of adhesions-- pt tacchy 126s and tmax 102.7 pre-operative.    Admit Date:01-15-19  ICU Date: 1-17-19  OR Date: 1-17-19    ciprofloxacin (Hives)  penicillin (Rash)    PAST MEDICAL & SURGICAL HISTORY:  Bronchitis  Diabetes mellitus, type 2    Home Medications:  benazepril 10 mg oral tablet: 1 tab(s) orally once a day (25 Jun 2018 18:49)  GlipiZIDE XL 10 mg oral tablet, extended release: 1 tab(s) orally once a day (25 Jun 2018 18:49)  Levemir FlexTouch:  (25 Jun 2018 18:49)  metFORMIN 1000 mg oral tablet: 1 tab(s) orally 2 times a day (25 Jun 2018 18:49)      24HRS EVENT:  POD # 4  Neurologic: A+O, follows commands.  pain control w/ tylenol/oxy    Respiratory: on and off 2LNC when anxious     Cardiovascular: Hx CHF (EF 35%)  -d/c'd metoprolol and started home carvedilol, holding lasix due to labile, on PO Amiodarone  cardio- dr. lara called for follow up on pts rhythm.     Gastrointestinal/Nutrition: advanced to carb const full liquid today, PPI, colace    Renal/Genitourinary: Ureña, u/o 20-35cc/h    Hematologic: chronic anemia baseline 8-9-- procrit 40,000 unit qweekly- last 1/16  -SubQ Heparin    Infectious Disease: remains afebrile, no dx or abx    Endocrine: insulin drip started 2/2 poor glucose control, will attempt to titrate down to ISS  -started pts home lantus 67u qhs    PT c/s pending     DVT PTX: HepSubQ    GI PTX: Protonix    ***Tubes/Lines/Drains  ***  Peripheral IV  Urinary Catheter		Indication: Strict I&O    Date Placed: 1/17/2019    REVIEW OF SYSTEMS    [ X] A ten-point review of systems was otherwise negative except as noted.  [ ] Due to altered mental status/intubation, subjective information were not able to be obtained from the patient. History was obtained, to the extent possible, from review of the chart and collateral sources of information. JILLIAN SEE  [id number]  73y Female    Indication for ICU admission: high risk pt w/ partial sbo 2/2 to incarcerated ventral hernia;  s/p repair w/ mesh and removal of adhesions-- pt tacchy 126s and tmax 102.7 pre-operative.    Admit Date:01-15-19  ICU Date: 1-17-19  OR Date: 1-17-19    ciprofloxacin (Hives)  penicillin (Rash)    PAST MEDICAL & SURGICAL HISTORY:  Bronchitis  Diabetes mellitus, type 2    Home Medications:  benazepril 10 mg oral tablet: 1 tab(s) orally once a day (25 Jun 2018 18:49)  GlipiZIDE XL 10 mg oral tablet, extended release: 1 tab(s) orally once a day (25 Jun 2018 18:49)  Levemir FlexTouch:  (25 Jun 2018 18:49)  metFORMIN 1000 mg oral tablet: 1 tab(s) orally 2 times a day (25 Jun 2018 18:49)      24HRS EVENT:  POD # 4  Neurologic: A+O, follows commands.  pain control w/ tylenol/oxy    Respiratory: on and off 2LNC when anxious     Cardiovascular: Hx CHF (EF 35%)  -d/c'd metoprolol and started home carvedilol, holding lasix due to labile, on PO Amiodarone  cardio- dr. lara called for follow up on pts rhythm.     Gastrointestinal/Nutrition: advanced to carb const full liquid today, PPI, colace    Renal/Genitourinary: Ureña, u/o 20-35cc/h. Mg repleated    Hematologic: chronic anemia baseline 8-9-- procrit 40,000 unit qweekly- last 1/16  -SubQ Heparin  -Hg-7.9 follow with Heme    Infectious Disease: remains afebrile, no dx or abx    Endocrine: insulin drip dc 1/21  -started pts home lantus 67u qhs    PT c/s pending     DVT PTX: HepSubQ    GI PTX: Protonix    ***Tubes/Lines/Drains  ***  Peripheral IV  Urinary Catheter		Indication: Strict I&O    Date Placed: 1/17/2019    REVIEW OF SYSTEMS    [ X] A ten-point review of systems was otherwise negative except as noted.  [ ] Due to altered mental status/intubation, subjective information were not able to be obtained from the patient. History was obtained, to the extent possible, from review of the chart and collateral sources of information.     Daily     Daily     Diet, Consistent Carbohydrate w/Evening Snack (01-21-19 @ 10:47)      CURRENT MEDS:  Neurologic Medications  acetaminophen   Tablet .. 650 milliGRAM(s) Oral every 6 hours  ondansetron Injectable 4 milliGRAM(s) IV Push every 8 hours PRN Nausea and/or Vomiting  oxyCODONE    IR 5 milliGRAM(s) Oral every 6 hours PRN Moderate Pain (4 - 6)    Respiratory Medications    Cardiovascular Medications  amiodarone    Tablet 200 milliGRAM(s) Oral two times a day  carvedilol 3.125 milliGRAM(s) Oral every 12 hours    Gastrointestinal Medications  dextrose 5%. 1000 milliLiter(s) IV Continuous <Continuous>  docusate sodium 100 milliGRAM(s) Oral three times a day  pantoprazole    Tablet 40 milliGRAM(s) Oral before breakfast    Genitourinary Medications    Hematologic/Oncologic Medications  heparin  Injectable 5000 Unit(s) SubCutaneous every 8 hours    Antimicrobial/Immunologic Medications    Endocrine/Metabolic Medications  dextrose 40% Gel 15 Gram(s) Oral once PRN Blood Glucose LESS THAN 70 milliGRAM(s)/deciliter  dextrose 50% Injectable 12.5 Gram(s) IV Push once  dextrose 50% Injectable 25 Gram(s) IV Push once  dextrose 50% Injectable 25 Gram(s) IV Push once  glucagon  Injectable 1 milliGRAM(s) IntraMuscular once PRN Glucose LESS THAN 70 milligrams/deciliter  insulin glargine Injectable (LANTUS) 67 Unit(s) SubCutaneous at bedtime  insulin regular  human corrective regimen sliding scale   IV Push every 6 hours    Topical/Other Medications  chlorhexidine 4% Liquid 1 Application(s) Topical <User Schedule>      ICU Vital Signs Last 24 Hrs  T(C): 36.3 (21 Jan 2019 08:00), Max: 36.8 (21 Jan 2019 00:00)  T(F): 97.3 (21 Jan 2019 08:00), Max: 98.2 (21 Jan 2019 00:00)  HR: 78 (21 Jan 2019 10:00) (66 - 92)  BP: 110/52 (21 Jan 2019 10:00) (83/37 - 119/74)  BP(mean): 80 (21 Jan 2019 08:00) (48 - 103)  ABP: --  ABP(mean): --  RR: 27 (21 Jan 2019 10:00) (23 - 38)  SpO2: 98% (21 Jan 2019 10:00) (95% - 99%)      Adult Advanced Hemodynamics Last 24 Hrs  CVP(mm Hg): --  CVP(cm H2O): --  CO: --  CI: --  PA: --  PA(mean): --  PCWP: --  SVR: --  SVRI: --  PVR: --  PVRI: --          I&O's Summary    20 Jan 2019 07:01  -  21 Jan 2019 07:00  --------------------------------------------------------  IN: 407 mL / OUT: 630 mL / NET: -223 mL    21 Jan 2019 07:01  -  21 Jan 2019 11:42  --------------------------------------------------------  IN: 340 mL / OUT: 220 mL / NET: 120 mL      I&O's Detail    20 Jan 2019 07:01  -  21 Jan 2019 07:00  --------------------------------------------------------  IN:    insulin Infusion: 32 mL    sodium chloride 0.9%: 375 mL  Total IN: 407 mL    OUT:    Bulb: 20 mL    Indwelling Catheter - Urethral: 610 mL  Total OUT: 630 mL    Total NET: -223 mL      21 Jan 2019 07:01  -  21 Jan 2019 11:42  --------------------------------------------------------  IN:    Oral Fluid: 340 mL  Total IN: 340 mL    OUT:    Indwelling Catheter - Urethral: 220 mL  Total OUT: 220 mL    Total NET: 120 mL          PHYSICAL EXAM:    General/Neuro  RASS:             GCS:     = E   / V   / M      General/Neuro NAD  RASS: 1            GCS:     = E 4  / V 5  / M 6     Deficits:      alert & oriented x 3, no focal deficits    Lungs:      clear to auscultation, Normal expansion/effort.     Cardiovascular : S1, S2.  Regular rate and rhythm.    GI: Abdomen soft, mild tenderness to LLQ. , aviva in llq, abdominal binder in place    :       Ureña catheter in place      CXR: No recent xray. no resp problems.     LABS:  CAPILLARY BLOOD GLUCOSE      POCT Blood Glucose.: 277 mg/dL (21 Jan 2019 10:53)  POCT Blood Glucose.: 127 mg/dL (20 Jan 2019 21:13)  POCT Blood Glucose.: 245 mg/dL (20 Jan 2019 18:40)  POCT Blood Glucose.: 159 mg/dL (20 Jan 2019 16:18)  POCT Blood Glucose.: 172 mg/dL (20 Jan 2019 14:25)  POCT Blood Glucose.: 150 mg/dL (20 Jan 2019 11:52)                          7.9    7.76  )-----------( 162      ( 20 Jan 2019 23:57 )             26.6       01-20    136  |  102  |  17  ----------------------------<  131<H>  4.7   |  18  |  0.9    Ca    8.5      20 Jan 2019 23:57  Phos  2.4     01-20  Mg     1.8     01-20        PT/INR - ( 20 Jan 2019 23:57 )   PT: 13.20 sec;   INR: 1.15 ratio         PTT - ( 20 Jan 2019 23:57 )  PTT:35.0 sec JILLIAN SEE  [id number]  73y Female    Indication for ICU admission: high risk pt w/ partial sbo 2/2 to incarcerated ventral hernia;  s/p repair w/ mesh and removal of adhesions-- pt tacchy 126s and tmax 102.7 pre-operative.    Admit Date:01-15-19  ICU Date: 1-17-19  OR Date: 1-17-19    ciprofloxacin (Hives)  penicillin (Rash)    PAST MEDICAL & SURGICAL HISTORY:  Bronchitis  Diabetes mellitus, type 2    Home Medications:  benazepril 10 mg oral tablet: 1 tab(s) orally once a day (25 Jun 2018 18:49)  GlipiZIDE XL 10 mg oral tablet, extended release: 1 tab(s) orally once a day (25 Jun 2018 18:49)  Levemir FlexTouch:  (25 Jun 2018 18:49)  metFORMIN 1000 mg oral tablet: 1 tab(s) orally 2 times a day (25 Jun 2018 18:49)      24HRS EVENT:  POD # 4  Neurologic: A+O, follows commands.  pain control w/ tylenol/oxy    Respiratory: on and off 2LNC when anxious     Cardiovascular: Hx CHF (EF 35%)  -d/c'd metoprolol and started home carvedilol, holding lasix due to labile, on PO Amiodarone  cardio- dr. lara called for follow up on pts rhythm.     Gastrointestinal/Nutrition: advanced to carb const full liquid today, PPI, colace    Renal/Genitourinary: Ureña, u/o 20-35cc/h. Mg repleated    Hematologic: chronic anemia baseline 8-9-- procrit 40,000 unit qweekly- last 1/16  -SubQ Heparin  -Hg-7.9 follow with Heme    Infectious Disease: remains afebrile, no dx or abx    Endocrine: insulin drip dc 1/21  -started pts home lantus 67u qhs    PT c/s pending     DVT PTX: HepSubQ    GI PTX: Protonix    ***Tubes/Lines/Drains  ***  Peripheral IV  Urinary Catheter		Indication: Strict I&O    Date Placed: 1/17/2019    REVIEW OF SYSTEMS    [ X] A ten-point review of systems was otherwise negative except as noted.  [ ] Due to altered mental status/intubation, subjective information were not able to be obtained from the patient. History was obtained, to the extent possible, from review of the chart and collateral sources of information.     Daily     Daily     Diet, Consistent Carbohydrate w/Evening Snack (01-21-19 @ 10:47)      CURRENT MEDS:  Neurologic Medications  acetaminophen   Tablet .. 650 milliGRAM(s) Oral every 6 hours  ondansetron Injectable 4 milliGRAM(s) IV Push every 8 hours PRN Nausea and/or Vomiting  oxyCODONE    IR 5 milliGRAM(s) Oral every 6 hours PRN Moderate Pain (4 - 6)    Respiratory Medications    Cardiovascular Medications  amiodarone    Tablet 200 milliGRAM(s) Oral two times a day  carvedilol 3.125 milliGRAM(s) Oral every 12 hours    Gastrointestinal Medications  dextrose 5%. 1000 milliLiter(s) IV Continuous <Continuous>  docusate sodium 100 milliGRAM(s) Oral three times a day  pantoprazole    Tablet 40 milliGRAM(s) Oral before breakfast    Genitourinary Medications    Hematologic/Oncologic Medications  heparin  Injectable 5000 Unit(s) SubCutaneous every 8 hours    Antimicrobial/Immunologic Medications    Endocrine/Metabolic Medications  dextrose 40% Gel 15 Gram(s) Oral once PRN Blood Glucose LESS THAN 70 milliGRAM(s)/deciliter  dextrose 50% Injectable 12.5 Gram(s) IV Push once  dextrose 50% Injectable 25 Gram(s) IV Push once  dextrose 50% Injectable 25 Gram(s) IV Push once  glucagon  Injectable 1 milliGRAM(s) IntraMuscular once PRN Glucose LESS THAN 70 milligrams/deciliter  insulin glargine Injectable (LANTUS) 67 Unit(s) SubCutaneous at bedtime  insulin regular  human corrective regimen sliding scale   IV Push every 6 hours    Topical/Other Medications  chlorhexidine 4% Liquid 1 Application(s) Topical <User Schedule>      ICU Vital Signs Last 24 Hrs  T(C): 36.3 (21 Jan 2019 08:00), Max: 36.8 (21 Jan 2019 00:00)  T(F): 97.3 (21 Jan 2019 08:00), Max: 98.2 (21 Jan 2019 00:00)  HR: 78 (21 Jan 2019 10:00) (66 - 92)  BP: 110/52 (21 Jan 2019 10:00) (83/37 - 119/74)  BP(mean): 80 (21 Jan 2019 08:00) (48 - 103)  ABP: --  ABP(mean): --  RR: 27 (21 Jan 2019 10:00) (23 - 38)  SpO2: 98% (21 Jan 2019 10:00) (95% - 99%)      Adult Advanced Hemodynamics Last 24 Hrs            I&O's Summary    20 Jan 2019 07:01  -  21 Jan 2019 07:00  --------------------------------------------------------  IN: 407 mL / OUT: 630 mL / NET: -223 mL    21 Jan 2019 07:01  -  21 Jan 2019 11:42  --------------------------------------------------------  IN: 340 mL / OUT: 220 mL / NET: 120 mL      I&O's Detail    20 Jan 2019 07:01  -  21 Jan 2019 07:00  --------------------------------------------------------  IN:    insulin Infusion: 32 mL    sodium chloride 0.9%: 375 mL  Total IN: 407 mL    OUT:    Bulb: 20 mL    Indwelling Catheter - Urethral: 610 mL  Total OUT: 630 mL    Total NET: -223 mL      21 Jan 2019 07:01  -  21 Jan 2019 11:42  --------------------------------------------------------  IN:    Oral Fluid: 340 mL  Total IN: 340 mL    OUT:    Indwelling Catheter - Urethral: 220 mL  Total OUT: 220 mL    Total NET: 120 mL          PHYSICAL EXAM:    General/Neuro  RASS:             GCS:     = E   / V   / M      General/Neuro NAD  RASS: 1            GCS:     = E 4  / V 5  / M 6     Deficits:      alert & oriented x 3, no focal deficits    Lungs:      clear to auscultation, Normal expansion/effort.     Cardiovascular : S1, S2.  Regular rate and rhythm.    GI: Abdomen soft, mild tenderness to LLQ. , aviva in llq, abdominal binder in place    :       Ureña catheter in place      CXR: No recent xray. no resp problems.     LABS:  CAPILLARY BLOOD GLUCOSE      POCT Blood Glucose.: 277 mg/dL (21 Jan 2019 10:53)  POCT Blood Glucose.: 127 mg/dL (20 Jan 2019 21:13)  POCT Blood Glucose.: 245 mg/dL (20 Jan 2019 18:40)  POCT Blood Glucose.: 159 mg/dL (20 Jan 2019 16:18)  POCT Blood Glucose.: 172 mg/dL (20 Jan 2019 14:25)  POCT Blood Glucose.: 150 mg/dL (20 Jan 2019 11:52)                          7.9    7.76  )-----------( 162      ( 20 Jan 2019 23:57 )             26.6       01-20    136  |  102  |  17  ----------------------------<  131<H>  4.7   |  18  |  0.9    Ca    8.5      20 Jan 2019 23:57  Phos  2.4     01-20  Mg     1.8     01-20        PT/INR - ( 20 Jan 2019 23:57 )   PT: 13.20 sec;   INR: 1.15 ratio         PTT - ( 20 Jan 2019 23:57 )  PTT:35.0 sec

## 2019-01-21 NOTE — PROGRESS NOTE ADULT - SUBJECTIVE AND OBJECTIVE BOX
GENERAL SURGERY PROGRESS NOTE     JILLIAN SEE  73y  Female  Hospital day :6d  POD:3  Procedure: Enterolysis  Repair of incarcerated ventral hernia with mesh    OVERNIGHT EVENTS: Patient started on full liquid diet, tolerating, patient is started on her home carvedilol, was on insulin drip varying between 2-4units/hr, at present the insulin drip is on hold, B.sugar-135mg/dl. patient is ambulating.     T(F): 98.2 (01-21-19 @ 00:00), Max: 98.2 (01-21-19 @ 00:00)  HR: 84 (01-21-19 @ 00:00) (80 - 92)  BP: 99/56 (01-20-19 @ 23:00) (92/63 - 131/70)  RR: 38 (01-21-19 @ 00:00) (15 - 38)  SpO2: 98% (01-21-19 @ 00:00) (95% - 99%)    DIET/FLUIDS: magnesium sulfate  IVPB 2 Gram(s) IV Intermittent once                                                                                  DRAINS:   01-19-19 @ 07:01  -  01-20-19 @ 07:00  --------------------------------------------------------  OUT: 45 mL      URINE:   01-19-19 @ 07:01  -  01-20-19 @ 07:00  --------------------------------------------------------  OUT: 265 mL     Indwelling Urethral Catheter:     Connect To:  Straight Drainage/[place]    Indication:  Urinary Retention / Obstruction (01-20-19 @ 04:57)    GI proph:  pantoprazole    Tablet 40 milliGRAM(s) Oral before breakfast    AC/ proph: heparin  Injectable 5000 Unit(s) SubCutaneous every 8 hours    ABx:     PHYSICAL EXAM:  GENERAL:appears umcomfortable and anxious   CHEST/LUNG: Clear to auscultation bilaterally  HEART: Regular rate and rhythm  ABDOMEN: Soft, Nontender, Nondistended; surgical site clean dry and intact         LABS  Labs:  CAPILLARY BLOOD GLUCOSE      POCT Blood Glucose.: 127 mg/dL (20 Jan 2019 21:13)  POCT Blood Glucose.: 245 mg/dL (20 Jan 2019 18:40)  POCT Blood Glucose.: 159 mg/dL (20 Jan 2019 16:18)  POCT Blood Glucose.: 172 mg/dL (20 Jan 2019 14:25)  POCT Blood Glucose.: 150 mg/dL (20 Jan 2019 11:52)  POCT Blood Glucose.: 152 mg/dL (20 Jan 2019 10:59)  POCT Blood Glucose.: 155 mg/dL (20 Jan 2019 09:55)  POCT Blood Glucose.: 155 mg/dL (20 Jan 2019 09:05)  POCT Blood Glucose.: 145 mg/dL (20 Jan 2019 08:19)  POCT Blood Glucose.: 131 mg/dL (20 Jan 2019 06:59)  POCT Blood Glucose.: 155 mg/dL (20 Jan 2019 05:48)  POCT Blood Glucose.: 160 mg/dL (20 Jan 2019 04:53)  POCT Blood Glucose.: 182 mg/dL (20 Jan 2019 03:43)                          7.9    7.76  )-----------( 162      ( 20 Jan 2019 23:57 )             26.6         01-20    136  |  102  |  17  ----------------------------<  131<H>  4.7   |  18  |  0.9      Calcium, Total Serum: 8.5 mg/dL (01-20-19 @ 23:57)      LFTs:         Coags:     13.20  ----< 1.15    ( 20 Jan 2019 23:57 )     35.0

## 2019-01-21 NOTE — CONSULT NOTE ADULT - SUBJECTIVE AND OBJECTIVE BOX
HPI:  HPI:   73y Female PMH of DMT2, CHF, Ovarian CA s/p BARON and BSO 2y ago, gastric CA s/p chemo follows at [facility] Oncologist Dr. Pratt, has chronically incarcerated ventral hernia containing loops of small bowel, presents with abdominal pain that has progressively worsened over 1 week with associated nausea, decreased appetite, and infrequent, small BM, no flatus. Patient had CT A/P demonstrating loops of dilated small bowel concerning for SBO, TP is seen in hernia neck, with edema in hernia sac. LA 3.2. No fevers, HD Stable. Patient poor historian, most of F/U at [facility], unsure of names of care providers. Not sure when last C-scope/endoscopy was.   Transferred to [place] for evaluation.    PAST MEDICAL & SURGICAL HISTORY:  Bronchitis  Diabetes mellitus, type 2 (15 Cortes 2019 21:17)      PAST MEDICAL & SURGICAL HISTORY:  Bronchitis  Diabetes mellitus, type 2      Hospital Course: underwent enterolysis/ hernia repair 1/17. Medically stable, eating. Ambulating to the bathroom with assistance.     TODAY'S SUBJECTIVE & REVIEW OF SYMPTOMS:     Constitutional WNL   Cardio cardiomyopathy. NORIEGA   Resp NORIEGA   GI see HPI  Heme Hx UterineCA  Endo WNL  Skin WNL  MSK LBP  Neuro WNL  Cognitive WNL  Psych WNL      MEDICATIONS  (STANDING):  acetaminophen   Tablet .. 650 milliGRAM(s) Oral every 6 hours  amiodarone    Tablet 200 milliGRAM(s) Oral two times a day  carvedilol 3.125 milliGRAM(s) Oral every 12 hours  chlorhexidine 4% Liquid 1 Application(s) Topical <User Schedule>  dextrose 50% Injectable 25 Gram(s) IV Push once  dextrose 50% Injectable 25 Gram(s) IV Push once  docusate sodium 100 milliGRAM(s) Oral three times a day  heparin  Injectable 5000 Unit(s) SubCutaneous every 8 hours  insulin glargine Injectable (LANTUS) 67 Unit(s) SubCutaneous at bedtime  insulin regular  human corrective regimen sliding scale   IV Push every 6 hours  pantoprazole    Tablet 40 milliGRAM(s) Oral before breakfast    MEDICATIONS  (PRN):  ondansetron Injectable 4 milliGRAM(s) IV Push every 8 hours PRN Nausea and/or Vomiting      FAMILY HISTORY:      Allergies    ciprofloxacin (Hives)  penicillin (Rash)    Intolerances        SOCIAL HISTORY:    [  ] Etoh  [  ] Smoking  [  ] Substance abuse     Home Environment:  [  ] Home Alone  [  ] Lives with Family  [  ] Home Health Aid    Dwelling:  [  ] Apartment  [ x ] Private House  [  ] Adult Home  [  ] Skilled Nursing Facility      [  ] Short Term  [  ] Long Term  [x  ] Stairs       Elevator [  ]    FUNCTIONAL STATUS PTA: (Check all that apply)  Ambulation: [ x  ]Independent    [  ] Dependent     [  ] Non-Ambulatory. Crawled up stairs sec to CHF/ fatigue  Assistive Device: [  ] SA Cane  [  ]  Q Cane  [  ] Walker  [  ]  Wheelchair  ADL : [x  ] Independent  [  ]  Dependent       Vital Signs Last 24 Hrs  T(C): 36.6 (21 Jan 2019 12:20), Max: 36.8 (21 Jan 2019 00:00)  T(F): 97.8 (21 Jan 2019 12:20), Max: 98.2 (21 Jan 2019 00:00)  HR: 80 (21 Jan 2019 12:20) (66 - 90)  BP: 128/76 (21 Jan 2019 12:20) (83/37 - 128/76)  BP(mean): 80 (21 Jan 2019 08:00) (48 - 103)  RR: 18 (21 Jan 2019 12:20) (18 - 38)  SpO2: 98% (21 Jan 2019 10:00) (95% - 99%)      PHYSICAL EXAM: Alert & Oriented X3  GENERAL: NAD, well-groomed, well-developed, obese  HEAD:  Atraumatic, Normocephalic  EYES: EOMI, PERRLA, conjunctiva and sclera clear  CHEST/LUNG: Clear bilaterally; No rales, rhonchi, wheezing, or rubs  HEART: Regular rate and rhythm  ABDOMEN: obese, NT, distended  EXTREMITIES:No clubbing, cyanosis, or edema    NERVOUS SYSTEM:  Cranial Nerves 2-12 intact [x  ] Abnormal  [  ]  ROM: WFL all extremities [x  ]  Abnormal [  ]  Motor Strength: WFL all extremities  [  ]  Abnormal [ x ] 4-/5 prox  Sensation: intact to light touch [  ] Abnormal [  ]  Reflexes: Symmetric [  ]  Abnormal [  ]    FUNCTIONAL STATUS:  Bed Mobility: Independent [  ]  Supervision [  ]  Needs Assistance [  ]  N/A [  ]  Transfers: Independent [  ]  Supervision [x  ]  Needs Assistance [  ]  N/A [  ]   Ambulation: Independent [  ]  Supervision [ x ]  Needs Assistance [  ]  N/A [  ]  ADL: Independent [  ] Requires Assistance [  ] N/A [  ]      LABS:                        8.7    8.57  )-----------( 179      ( 21 Jan 2019 11:24 )             29.7     01-20    136  |  102  |  17  ----------------------------<  131<H>  4.7   |  18  |  0.9    Ca    8.5      20 Jan 2019 23:57  Phos  2.4     01-20  Mg     1.8     01-20      PT/INR - ( 20 Jan 2019 23:57 )   PT: 13.20 sec;   INR: 1.15 ratio         PTT - ( 20 Jan 2019 23:57 )  PTT:35.0 sec      RADIOLOGY & ADDITIONAL STUDIES:    Assesment:

## 2019-01-21 NOTE — PROGRESS NOTE ADULT - ASSESSMENT
Assesment :  1-  s/p  SBO  sec  to  abd  hernia   recovering  from surgey  well     2-Anemia    stable  at 7.9    no  need  for  blood  tfx.   transfuse to keep hb >7    3-H/O Ovarian cancer  s/p  surgery   and  chemotherapy   in Remission      iv hydration   monitor electrolytes  cont other supportive care.  will need MRCP to evaluate pancreatic lesion, can be done as outpt when acute issue is resolved.  cont other supportive care as per Sx.  will f/u

## 2019-01-21 NOTE — PROGRESS NOTE ADULT - SUBJECTIVE AND OBJECTIVE BOX
Patient is a 73y old  Female who presents with a chief complaint of Abdominal pain (21 Jan 2019 03:01)       Pt is seen and examined  pt is awake and out of bed to chair  pt seems comfortable and denies any complaints at this time          ROS:  Negative   MEDICATIONS  (STANDING):  acetaminophen   Tablet .. 650 milliGRAM(s) Oral every 6 hours  amiodarone    Tablet 200 milliGRAM(s) Oral two times a day  carvedilol 3.125 milliGRAM(s) Oral every 12 hours  chlorhexidine 4% Liquid 1 Application(s) Topical <User Schedule>  dextrose 50% Injectable 25 Gram(s) IV Push once  dextrose 50% Injectable 25 Gram(s) IV Push once  docusate sodium 100 milliGRAM(s) Oral three times a day  heparin  Injectable 5000 Unit(s) SubCutaneous every 8 hours  insulin glargine Injectable (LANTUS) 67 Unit(s) SubCutaneous at bedtime  insulin regular  human corrective regimen sliding scale   IV Push every 6 hours  pantoprazole    Tablet 40 milliGRAM(s) Oral before breakfast    MEDICATIONS  (PRN):  ondansetron Injectable 4 milliGRAM(s) IV Push every 8 hours PRN Nausea and/or Vomiting      Allergies    ciprofloxacin (Hives)  penicillin (Rash)    Intolerances        Vital Signs Last 24 Hrs  T(C): 36.3 (21 Jan 2019 08:00), Max: 36.8 (21 Jan 2019 00:00)  T(F): 97.3 (21 Jan 2019 08:00), Max: 98.2 (21 Jan 2019 00:00)  HR: 78 (21 Jan 2019 10:00) (66 - 90)  BP: 110/52 (21 Jan 2019 10:00) (83/37 - 119/74)  BP(mean): 80 (21 Jan 2019 08:00) (48 - 103)  RR: 27 (21 Jan 2019 10:00) (23 - 38)  SpO2: 98% (21 Jan 2019 10:00) (95% - 99%)    PHYSICAL EXAM  General: adult in NAD  HEENT: clear oropharynx, anicteric sclera, pink conjunctiva  Neck: supple  CV: normal S1/S2 with no murmur rubs or gallops  Lungs: positive air movement b/l ant lungs,clear to auscultation, no wheezes, no rales  Abdomen: soft non-tender non-distended, midline staple in place  Ext: b/l LE  edema  Skin: no rashes and no petechiae  Neuro: alert and oriented X 4, no focal deficits  LABS:                          7.9    7.76  )-----------( 162      ( 20 Jan 2019 23:57 )             26.6         Mean Cell Volume : 88.4 fL  Mean Cell Hemoglobin : 26.2 pg  Mean Cell Hemoglobin Concentration : 29.7 g/dL  Auto Neutrophil # : x  Auto Lymphocyte # : x  Auto Monocyte # : x  Auto Eosinophil # : x  Auto Basophil # : x  Auto Neutrophil % : x  Auto Lymphocyte % : x  Auto Monocyte % : x  Auto Eosinophil % : x  Auto Basophil % : x    Serial CBC's  01-20 @ 23:57  Hct-26.6 / Hgb-7.9 / Plat-162 / RBC-3.01 / WBC-7.76          Serial CBC's  01-20 @ 00:36  Hct-27.8 / Hgb-8.3 / Plat-179 / RBC-3.14 / WBC-11.06          Serial CBC's  01-18 @ 22:54  Hct-27.2 / Hgb-8.0 / Plat-158 / RBC-3.05 / WBC-11.31          Serial CBC's  01-18 @ 11:00  Hct-27.5 / Hgb-8.2 / Plat-152 / RBC-3.16 / WBC-8.43          Serial CBC's  01-18 @ 04:40  Hct-28.0 / Hgb-8.4 / Plat-147 / RBC-3.23 / WBC-7.51            01-20    136  |  102  |  17  ----------------------------<  131<H>  4.7   |  18  |  0.9    Ca    8.5      20 Jan 2019 23:57  Phos  2.4     01-20  Mg     1.8     01-20        PT/INR - ( 20 Jan 2019 23:57 )   PT: 13.20 sec;   INR: 1.15 ratio         PTT - ( 20 Jan 2019 23:57 )  PTT:35.0 sec    Hematocrit: 26.6 % (01-20-19 @ 23:57)  Platelet Count - Automated: 162 K/uL (01-20-19 @ 23:57)  Hemoglobin: 7.9 g/dL (01-20-19 @ 23:57)  WBC Count: 7.76 K/uL (01-20-19 @ 23:57)  Platelet Count - Automated: 179 K/uL (01-20-19 @ 00:36)  Hemoglobin: 8.3 g/dL (01-20-19 @ 00:36)  WBC Count: 11.06 K/uL (01-20-19 @ 00:36)  Hematocrit: 27.8 % (01-20-19 @ 00:36)  Hemoglobin: 8.0 g/dL (01-18-19 @ 22:54)  WBC Count: 11.31 K/uL (01-18-19 @ 22:54)  Hematocrit: 27.2 % (01-18-19 @ 22:54)  Platelet Count - Automated: 158 K/uL (01-18-19 @ 22:54)  Hematocrit: 27.5 % (01-18-19 @ 11:00)  Platelet Count - Automated: 152 K/uL (01-18-19 @ 11:00)  Hemoglobin: 8.2 g/dL (01-18-19 @ 11:00)  WBC Count: 8.43 K/uL (01-18-19 @ 11:00)  Platelet Count - Automated: 147 K/uL (01-18-19 @ 04:40)  Hemoglobin: 8.4 g/dL (01-18-19 @ 04:40)  WBC Count: 7.51 K/uL (01-18-19 @ 04:40)  Hematocrit: 28.0 % (01-18-19 @ 04:40)  Hematocrit: 23.4 % (01-17-19 @ 23:35)  Platelet Count - Automated: 155 K/uL (01-17-19 @ 23:35)  Hemoglobin: 6.8 g/dL (01-17-19 @ 23:35)  WBC Count: 9.17 K/uL (01-17-19 @ 23:35)  Hemoglobin: 8.1 g/dL (01-17-19 @ 18:30)  WBC Count: 12.99 K/uL (01-17-19 @ 18:30)  Platelet Count - Automated: 232 K/uL (01-17-19 @ 18:30)  Hematocrit: 28.1 % (01-17-19 @ 18:30)  Hematocrit: 28.3 % (01-16-19 @ 23:51)  WBC Count: 8.96 K/uL (01-16-19 @ 23:51)  Platelet Count - Automated: 185 K/uL (01-16-19 @ 23:51)  Hemoglobin: 8.1 g/dL (01-16-19 @ 23:51)  Hematocrit: 28.1 % (01-15-19 @ 23:54)  Platelet Count - Automated: 193 K/uL (01-15-19 @ 23:54)  Hemoglobin: 8.4 g/dL (01-15-19 @ 23:54)  WBC Count: 7.66 K/uL (01-15-19 @ 23:54)  WBC Count: 10.39 K/uL (01-15-19 @ 10:30)  Hematocrit: 31.7 % (01-15-19 @ 10:30)  Platelet Count - Automated: 252 K/uL (01-15-19 @ 10:30)  Hemoglobin: 9.2 g/dL (01-15-19 @ 10:30)                BLOOD SMEAR INTERPRETATION:       RADIOLOGY & ADDITIONAL STUDIES:

## 2019-01-22 LAB
ANION GAP SERPL CALC-SCNC: 16 MMOL/L — HIGH (ref 7–14)
ANION GAP SERPL CALC-SCNC: 17 MMOL/L — HIGH (ref 7–14)
APTT BLD: 44.6 SEC — HIGH (ref 27–39.2)
BASOPHILS # BLD AUTO: 0.03 K/UL — SIGNIFICANT CHANGE UP (ref 0–0.2)
BASOPHILS NFR BLD AUTO: 0.4 % — SIGNIFICANT CHANGE UP (ref 0–1)
BUN SERPL-MCNC: 16 MG/DL — SIGNIFICANT CHANGE UP (ref 10–20)
BUN SERPL-MCNC: 16 MG/DL — SIGNIFICANT CHANGE UP (ref 10–20)
CALCIUM SERPL-MCNC: 8.6 MG/DL — SIGNIFICANT CHANGE UP (ref 8.5–10.1)
CALCIUM SERPL-MCNC: 8.8 MG/DL — SIGNIFICANT CHANGE UP (ref 8.5–10.1)
CHLORIDE SERPL-SCNC: 97 MMOL/L — LOW (ref 98–110)
CHLORIDE SERPL-SCNC: 97 MMOL/L — LOW (ref 98–110)
CO2 SERPL-SCNC: 19 MMOL/L — SIGNIFICANT CHANGE UP (ref 17–32)
CO2 SERPL-SCNC: 19 MMOL/L — SIGNIFICANT CHANGE UP (ref 17–32)
CREAT SERPL-MCNC: 1 MG/DL — SIGNIFICANT CHANGE UP (ref 0.7–1.5)
CREAT SERPL-MCNC: 1 MG/DL — SIGNIFICANT CHANGE UP (ref 0.7–1.5)
EOSINOPHIL # BLD AUTO: 0.12 K/UL — SIGNIFICANT CHANGE UP (ref 0–0.7)
EOSINOPHIL NFR BLD AUTO: 1.5 % — SIGNIFICANT CHANGE UP (ref 0–8)
GLUCOSE BLDC GLUCOMTR-MCNC: 194 MG/DL — HIGH (ref 70–99)
GLUCOSE BLDC GLUCOMTR-MCNC: 214 MG/DL — HIGH (ref 70–99)
GLUCOSE BLDC GLUCOMTR-MCNC: 222 MG/DL — HIGH (ref 70–99)
GLUCOSE BLDC GLUCOMTR-MCNC: 226 MG/DL — HIGH (ref 70–99)
GLUCOSE SERPL-MCNC: 221 MG/DL — HIGH (ref 70–99)
GLUCOSE SERPL-MCNC: 235 MG/DL — HIGH (ref 70–99)
HCT VFR BLD CALC: 27.2 % — LOW (ref 37–47)
HGB BLD-MCNC: 8.2 G/DL — LOW (ref 12–16)
IMM GRANULOCYTES NFR BLD AUTO: 1.5 % — HIGH (ref 0.1–0.3)
INR BLD: 1.15 RATIO — SIGNIFICANT CHANGE UP (ref 0.65–1.3)
LYMPHOCYTES # BLD AUTO: 2.25 K/UL — SIGNIFICANT CHANGE UP (ref 1.2–3.4)
LYMPHOCYTES # BLD AUTO: 27.7 % — SIGNIFICANT CHANGE UP (ref 20.5–51.1)
MAGNESIUM SERPL-MCNC: 1.9 MG/DL — SIGNIFICANT CHANGE UP (ref 1.8–2.4)
MCHC RBC-ENTMCNC: 25.9 PG — LOW (ref 27–31)
MCHC RBC-ENTMCNC: 30.1 G/DL — LOW (ref 32–37)
MCV RBC AUTO: 86.1 FL — SIGNIFICANT CHANGE UP (ref 81–99)
MONOCYTES # BLD AUTO: 0.8 K/UL — HIGH (ref 0.1–0.6)
MONOCYTES NFR BLD AUTO: 9.9 % — HIGH (ref 1.7–9.3)
NEUTROPHILS # BLD AUTO: 4.8 K/UL — SIGNIFICANT CHANGE UP (ref 1.4–6.5)
NEUTROPHILS NFR BLD AUTO: 59 % — SIGNIFICANT CHANGE UP (ref 42.2–75.2)
NRBC # BLD: 0 /100 WBCS — SIGNIFICANT CHANGE UP (ref 0–0)
PHOSPHATE SERPL-MCNC: 2.5 MG/DL — SIGNIFICANT CHANGE UP (ref 2.1–4.9)
PLATELET # BLD AUTO: 183 K/UL — SIGNIFICANT CHANGE UP (ref 130–400)
POTASSIUM SERPL-MCNC: 4.8 MMOL/L — SIGNIFICANT CHANGE UP (ref 3.5–5)
POTASSIUM SERPL-MCNC: 5.1 MMOL/L — HIGH (ref 3.5–5)
POTASSIUM SERPL-SCNC: 4.8 MMOL/L — SIGNIFICANT CHANGE UP (ref 3.5–5)
POTASSIUM SERPL-SCNC: 5.1 MMOL/L — HIGH (ref 3.5–5)
PROTHROM AB SERPL-ACNC: 13.2 SEC — HIGH (ref 9.95–12.87)
RBC # BLD: 3.16 M/UL — LOW (ref 4.2–5.4)
RBC # FLD: 17.4 % — HIGH (ref 11.5–14.5)
SODIUM SERPL-SCNC: 132 MMOL/L — LOW (ref 135–146)
SODIUM SERPL-SCNC: 133 MMOL/L — LOW (ref 135–146)
SURGICAL PATHOLOGY STUDY: SIGNIFICANT CHANGE UP
WBC # BLD: 8.12 K/UL — SIGNIFICANT CHANGE UP (ref 4.8–10.8)
WBC # FLD AUTO: 8.12 K/UL — SIGNIFICANT CHANGE UP (ref 4.8–10.8)

## 2019-01-22 PROCEDURE — 99024 POSTOP FOLLOW-UP VISIT: CPT

## 2019-01-22 RX ORDER — FUROSEMIDE 40 MG
40 TABLET ORAL DAILY
Qty: 0 | Refills: 0 | Status: DISCONTINUED | OUTPATIENT
Start: 2019-01-22 | End: 2019-01-23

## 2019-01-22 RX ORDER — ACETAMINOPHEN 500 MG
650 TABLET ORAL EVERY 6 HOURS
Qty: 0 | Refills: 0 | Status: DISCONTINUED | OUTPATIENT
Start: 2019-01-22 | End: 2019-01-23

## 2019-01-22 RX ORDER — OXYCODONE HYDROCHLORIDE 5 MG/1
5 TABLET ORAL EVERY 6 HOURS
Qty: 0 | Refills: 0 | Status: DISCONTINUED | OUTPATIENT
Start: 2019-01-22 | End: 2019-01-23

## 2019-01-22 RX ADMIN — HEPARIN SODIUM 5000 UNIT(S): 5000 INJECTION INTRAVENOUS; SUBCUTANEOUS at 05:06

## 2019-01-22 RX ADMIN — CARVEDILOL PHOSPHATE 3.12 MILLIGRAM(S): 80 CAPSULE, EXTENDED RELEASE ORAL at 17:29

## 2019-01-22 RX ADMIN — PANTOPRAZOLE SODIUM 40 MILLIGRAM(S): 20 TABLET, DELAYED RELEASE ORAL at 05:06

## 2019-01-22 RX ADMIN — HEPARIN SODIUM 5000 UNIT(S): 5000 INJECTION INTRAVENOUS; SUBCUTANEOUS at 13:47

## 2019-01-22 RX ADMIN — Medication 2: at 11:46

## 2019-01-22 RX ADMIN — CARVEDILOL PHOSPHATE 3.12 MILLIGRAM(S): 80 CAPSULE, EXTENDED RELEASE ORAL at 05:06

## 2019-01-22 RX ADMIN — Medication 1 SPRAY(S): at 16:13

## 2019-01-22 RX ADMIN — HEPARIN SODIUM 5000 UNIT(S): 5000 INJECTION INTRAVENOUS; SUBCUTANEOUS at 21:17

## 2019-01-22 RX ADMIN — METFORMIN HYDROCHLORIDE 1000 MILLIGRAM(S): 850 TABLET ORAL at 17:28

## 2019-01-22 RX ADMIN — Medication 1: at 08:18

## 2019-01-22 RX ADMIN — AMIODARONE HYDROCHLORIDE 200 MILLIGRAM(S): 400 TABLET ORAL at 17:29

## 2019-01-22 RX ADMIN — Medication 1 SPRAY(S): at 02:38

## 2019-01-22 RX ADMIN — Medication 650 MILLIGRAM(S): at 00:39

## 2019-01-22 RX ADMIN — Medication 40 MILLIGRAM(S): at 13:47

## 2019-01-22 RX ADMIN — METFORMIN HYDROCHLORIDE 1000 MILLIGRAM(S): 850 TABLET ORAL at 05:06

## 2019-01-22 RX ADMIN — Medication 100 MILLIGRAM(S): at 05:06

## 2019-01-22 RX ADMIN — Medication 650 MILLIGRAM(S): at 05:06

## 2019-01-22 RX ADMIN — Medication 1 SPRAY(S): at 08:18

## 2019-01-22 RX ADMIN — Medication 2: at 17:28

## 2019-01-22 RX ADMIN — Medication 100 MILLIGRAM(S): at 13:46

## 2019-01-22 RX ADMIN — Medication 650 MILLIGRAM(S): at 19:10

## 2019-01-22 RX ADMIN — CHLORHEXIDINE GLUCONATE 1 APPLICATION(S): 213 SOLUTION TOPICAL at 05:07

## 2019-01-22 NOTE — CHART NOTE - NSCHARTNOTEFT_GEN_A_CORE
Registered Dietitian Follow-Up (F4-16a)    ***Scroll to the bottom for RD recommendation***    Patient Profile Reviewed                           Yes [x]   No []  Nutrition History Previously Obtained        Yes [x]  No []  - pt is sitting on the chair, reported her appetite is fair, eating about 50% of meals. pt has hernia, couldn't practice small frequent meals at home due to work and other tasks she must do.       PERTINENT MEDICAL INFORMATIONS:  (1) p/w abd pain, s/p repair of incarcerated ventral hernia w/ mesh s/p repair of incarcerated ventral hernia w/ mesh.  (2) Passing flatus, tolerating diet.  (3) Poss need MRCP to eval pancreatic lesion as outpatient.      PERTINENT SUBJECTIVE INFORMATION:  (1) see above      DIET ORDER:   CC renal/ no snack  (inappropriate, pt not on HD)        ANTHROPOMETRICS:  - Ht.   149.86cm  - Wt.    (1/18): 75.4kg - no new weight since  - BMI. 33.6  - IBW. 44.3kg       PERTINENT LAB DATA:  1/22: h/h 8.2/27.2, K 5.1, glucose 235, GFR 56  PERTINENT MEDS: heparin, d5w, insulin, lisinopril, metformin, carvedilol, docusate, protonix, amiodarone      PHYSICAL FINDINGS  - APPEARANCE:        alert and oriented. no edema  - GI FUNCTION:        LBM 1/22 per pt  - TUBES:                       - ORAL/MOUTH:      none reported  - SKIN:                        surgical incision        NUTRITION REQUIREMENTS  WEIGHT USED:                          Ht: 149.86 cm. Wt: 75.4 kg. IBW: 44.3 kg.  - since initial note  ESTIMATED ENERGY NEEDS:       CONTINUE [ x ]      ADJUST [  ]    ESTIMATED ENERGY NEEDS:         7553-1353 kcal/day (MSJx1.1-1.2 AF)  ESTIMATED PROTEIN NEEDS:        53-62 g/day (1.2-1.4 g/kg IBW)  ESTIMATED FLUID NEEDS:             1ml/kcal     CURRENT NUTRIENT NEEDS:    likely improving, will monitor PO trend.           [x  ] PREVIOUS NUTRITION DIAGNOSIS:    (1)  Inadequate protein-energy intake - improving            [ x ] ONGOING        [  ] RESOLVED          PATIENT INTERVENTION:    [  x] ORAL        [ ] EN/TF     GOAL/EXPECTED OUTCOME:     pt to consume and tolerate >75% of all meals and snacks upon f/u in 3 days  INDICATOR/MONITORING:       RD to monitor diet order, energy intake, body composition, nutrition focused physical findings (PO trend)  NUTRITION INTERVENTION:        Meals and snacks.     RECS: (1) Please discontinue current RENAL diet modifier, pt will only be on CARBOHYDRATE CONSISTENT WITH SNACKS (not WITHOUT).

## 2019-01-22 NOTE — PROGRESS NOTE ADULT - ASSESSMENT
Assesment :  1-  s/p  SBO  sec  to  abd  hernia   recovering  from surgey  well     2-Anemia    stable     no  need  for  blood  tfx.   transfuse to keep hb >7      3-H/O Ovarian cancer  s/p  surgery   and  chemotherapy   in Remission      iv hydration   monitor electrolytes  cont other supportive care.  will need MRCP to evaluate pancreatic lesion, can be done as outpt when acute issue is resolved.  cont other supportive care as per Sx.  will f/u

## 2019-01-22 NOTE — PROGRESS NOTE ADULT - SUBJECTIVE AND OBJECTIVE BOX
Progress Note: General Surgery  Patient: JILLIAN SEE , 73y (1945)Female   MRN: [id number]  Location: [address]  Visit: 01-15-19 Inpatient  Date: 01-22-19 @ 05:37    Procedure/Diagnosis: s/p repair of incarcerated ventral hernia w/ meshs/p repair of incarcerated ventral hernia w/ mesh    Events/ 24h: Passing flatus, tolerating cc diet, restarted on home metformin. Pain controlled.    Vitals: T(F): 97.8 (01-22-19 @ 03:30), Max: 98.7 (01-21-19 @ 15:54)  HR: 79 (01-22-19 @ 03:30)  BP: 111/59 (01-22-19 @ 03:30) (93/66 - 128/76)  RR: 18 (01-22-19 @ 03:30)  SpO2: 98% (01-21-19 @ 10:00)    In:   01-20-19 @ 07:01  -  01-21-19 @ 07:00  --------------------------------------------------------  IN: 407 mL    01-21-19 @ 07:01  -  01-22-19 @ 05:37  --------------------------------------------------------  IN: 340 mL      Out:   01-20-19 @ 07:01  -  01-21-19 @ 07:00  --------------------------------------------------------  OUT:    Bulb: 20 mL    Indwelling Catheter - Urethral: 610 mL  Total OUT: 630 mL      01-21-19 @ 07:01  -  01-22-19 @ 05:37  --------------------------------------------------------  OUT:    Bulb: 75 mL    Indwelling Catheter - Urethral: 270 mL    Voided: 200 mL  Total OUT: 545 mL        Net:   01-20-19 @ 07:01  -  01-21-19 @ 07:00  --------------------------------------------------------  NET: -223 mL    01-21-19 @ 07:01  -  01-22-19 @ 05:37  --------------------------------------------------------  NET: -205 mL        Diet: Diet, Consistent Carbohydrate Renal/No Snacks (01-21-19 @ 16:55)    IV Fluids: dextrose 5%. 1000 milliLiter(s) (50 mL/Hr) IV Continuous <Continuous>      Physical Examination:  General Appearance: NAD  HEENT: EOMI, sclera non-icteric.  Heart: RRR   Lungs: CTABL.   Abdomen:  Soft, nontender, nondistended. No rigidity, guarding, or rebound tenderness.   MSK/Extremities: Warm & well-perfused. Peripheral pulses intact.  Skin: Warm, dry. No jaundice.       Medications: [Standing]  amiodarone    Tablet 200 milliGRAM(s) Oral two times a day  carvedilol 3.125 milliGRAM(s) Oral every 12 hours  chlorhexidine 4% Liquid 1 Application(s) Topical <User Schedule>  dextrose 5%. 1000 milliLiter(s) (50 mL/Hr) IV Continuous <Continuous>  dextrose 50% Injectable 25 Gram(s) IV Push once  dextrose 50% Injectable 25 Gram(s) IV Push once  dextrose 50% Injectable 12.5 Gram(s) IV Push once  dextrose 50% Injectable 25 Gram(s) IV Push once  dextrose 50% Injectable 25 Gram(s) IV Push once  docusate sodium 100 milliGRAM(s) Oral three times a day  furosemide    Tablet 20 milliGRAM(s) Oral daily  heparin  Injectable 5000 Unit(s) SubCutaneous every 8 hours  insulin lispro (HumaLOG) corrective regimen sliding scale   SubCutaneous three times a day before meals  lisinopril 10 milliGRAM(s) Oral daily  metFORMIN 1000 milliGRAM(s) Oral two times a day  pantoprazole    Tablet 40 milliGRAM(s) Oral before breakfast    DVT Prophylaxis: heparin  Injectable 5000 Unit(s) SubCutaneous every 8 hours    GI Prophylaxis: pantoprazole    Tablet 40 milliGRAM(s) Oral before breakfast    Antibiotics:   Anticoagulation:   Medications:[PRN]  dextrose 40% Gel 15 Gram(s) Oral once PRN  glucagon  Injectable 1 milliGRAM(s) IntraMuscular once PRN  ondansetron Injectable 4 milliGRAM(s) IV Push every 8 hours PRN  sodium chloride 0.65% Nasal 1 Spray(s) Both Nostrils every 4 hours PRN      Labs:                        8.2    8.12  )-----------( 183      ( 22 Jan 2019 01:03 )             27.2     01-22    132<L>  |  97<L>  |  16  ----------------------------<  235<H>  5.1<H>   |  19  |  1.0    Ca    8.8      22 Jan 2019 01:03  Phos  2.5     01-22  Mg     1.9     01-22        PT/INR - ( 22 Jan 2019 01:03 )   PT: 13.20 sec;   INR: 1.15 ratio         PTT - ( 22 Jan 2019 01:03 )  PTT:44.6 sec        Imaging:     < from: Xray Chest 1 View- PORTABLE-Routine (01.19.19 @ 05:42) >  No radiographic evidence of acute cardiopulmonary disease.    < end of copied text >      Assessment:  73y Female patient admitted s/p repair of incarcerated ventral hernia w/ mesh    Plan:    CC diet  DVT/GI ppx  OOBAT  IS  Pain control    Date/Time: 01-22-19 @ 05:37

## 2019-01-22 NOTE — PHYSICAL THERAPY INITIAL EVALUATION ADULT - GAIT DEVIATIONS NOTED, PT EVAL
Decreased step height, decreased speed, pt c/o SOB that resolves with rest. SPO2 96% on RA during amb./decreased stride length/decreased elodia/decreased step length

## 2019-01-22 NOTE — PHYSICAL THERAPY INITIAL EVALUATION ADULT - GENERAL OBSERVATIONS, REHAB EVAL
[id number] - [id number] Pt rec in b/s chair in NAD with family member at b/s. Pt with decreased vision, c/o SOB during activity.

## 2019-01-22 NOTE — PROGRESS NOTE ADULT - SUBJECTIVE AND OBJECTIVE BOX
Patient is a 73y old  Female who presents with a chief complaint of Abdominal pain (22 Jan 2019 05:36)       Pt is seen and examined  pt is awake and out of bed to chair  pt seems comfortable and denies any complaints at this time          ROS:  Negative     MEDICATIONS  (STANDING):  amiodarone    Tablet 200 milliGRAM(s) Oral two times a day  carvedilol 3.125 milliGRAM(s) Oral every 12 hours  chlorhexidine 4% Liquid 1 Application(s) Topical <User Schedule>  dextrose 5%. 1000 milliLiter(s) (50 mL/Hr) IV Continuous <Continuous>  dextrose 50% Injectable 25 Gram(s) IV Push once  dextrose 50% Injectable 25 Gram(s) IV Push once  dextrose 50% Injectable 12.5 Gram(s) IV Push once  dextrose 50% Injectable 25 Gram(s) IV Push once  dextrose 50% Injectable 25 Gram(s) IV Push once  docusate sodium 100 milliGRAM(s) Oral three times a day  furosemide    Tablet 40 milliGRAM(s) Oral daily  heparin  Injectable 5000 Unit(s) SubCutaneous every 8 hours  insulin lispro (HumaLOG) corrective regimen sliding scale   SubCutaneous three times a day before meals  lisinopril 10 milliGRAM(s) Oral daily  metFORMIN 1000 milliGRAM(s) Oral two times a day  pantoprazole    Tablet 40 milliGRAM(s) Oral before breakfast    MEDICATIONS  (PRN):  dextrose 40% Gel 15 Gram(s) Oral once PRN Blood Glucose LESS THAN 70 milliGRAM(s)/deciliter  glucagon  Injectable 1 milliGRAM(s) IntraMuscular once PRN Glucose LESS THAN 70 milligrams/deciliter  ondansetron Injectable 4 milliGRAM(s) IV Push every 8 hours PRN Nausea and/or Vomiting  sodium chloride 0.65% Nasal 1 Spray(s) Both Nostrils every 4 hours PRN Nasal Congestion      Allergies    ciprofloxacin (Hives)  penicillin (Rash)    Intolerances        Vital Signs Last 24 Hrs  T(C): 36 (22 Jan 2019 07:35), Max: 37.1 (21 Jan 2019 15:54)  T(F): 96.8 (22 Jan 2019 07:35), Max: 98.7 (21 Jan 2019 15:54)  HR: 77 (22 Jan 2019 07:35) (77 - 83)  BP: 117/55 (22 Jan 2019 07:35) (111/59 - 128/76)  BP(mean): --  RR: 18 (22 Jan 2019 07:35) (18 - 18)  SpO2: --    PHYSICAL EXAM  General: adult in NAD  HEENT: clear oropharynx, anicteric sclera, pink conjunctiva  Neck: supple  CV: normal S1/S2 with no murmur rubs or gallops  Lungs: positive air movement b/l ant lungs,clear to auscultation, no wheezes, no rales  Abdomen: soft non-tender non-distended, no hepatosplenomegaly  Ext: b/l LE edema  Skin: no rashes and no petechiae  Neuro: alert and oriented X 4, no focal deficits  LABS:                          8.2    8.12  )-----------( 183      ( 22 Jan 2019 01:03 )             27.2         Mean Cell Volume : 86.1 fL  Mean Cell Hemoglobin : 25.9 pg  Mean Cell Hemoglobin Concentration : 30.1 g/dL  Auto Neutrophil # : 4.80 K/uL  Auto Lymphocyte # : 2.25 K/uL  Auto Monocyte # : 0.80 K/uL  Auto Eosinophil # : 0.12 K/uL  Auto Basophil # : 0.03 K/uL  Auto Neutrophil % : 59.0 %  Auto Lymphocyte % : 27.7 %  Auto Monocyte % : 9.9 %  Auto Eosinophil % : 1.5 %  Auto Basophil % : 0.4 %    Serial CBC's  01-22 @ 01:03  Hct-27.2 / Hgb-8.2 / Plat-183 / RBC-3.16 / WBC-8.12          Serial CBC's  01-21 @ 11:24  Hct-29.7 / Hgb-8.7 / Plat-179 / RBC-3.37 / WBC-8.57          Serial CBC's  01-20 @ 23:57  Hct-26.6 / Hgb-7.9 / Plat-162 / RBC-3.01 / WBC-7.76          Serial CBC's  01-20 @ 00:36  Hct-27.8 / Hgb-8.3 / Plat-179 / RBC-3.14 / WBC-11.06          Serial CBC's  01-18 @ 22:54  Hct-27.2 / Hgb-8.0 / Plat-158 / RBC-3.05 / WBC-11.31            01-22    132<L>  |  97<L>  |  16  ----------------------------<  235<H>  5.1<H>   |  19  |  1.0    Ca    8.8      22 Jan 2019 01:03  Phos  2.5     01-22  Mg     1.9     01-22        PT/INR - ( 22 Jan 2019 01:03 )   PT: 13.20 sec;   INR: 1.15 ratio         PTT - ( 22 Jan 2019 01:03 )  PTT:44.6 sec    Hemoglobin: 8.2 g/dL (01-22-19 @ 01:03)  WBC Count: 8.12 K/uL (01-22-19 @ 01:03)  Hematocrit: 27.2 % (01-22-19 @ 01:03)  Platelet Count - Automated: 183 K/uL (01-22-19 @ 01:03)  Hematocrit: 29.7 % (01-21-19 @ 11:24)  Platelet Count - Automated: 179 K/uL (01-21-19 @ 11:24)  Hemoglobin: 8.7 g/dL (01-21-19 @ 11:24)  WBC Count: 8.57 K/uL (01-21-19 @ 11:24)  Hematocrit: 26.6 % (01-20-19 @ 23:57)  Platelet Count - Automated: 162 K/uL (01-20-19 @ 23:57)  Hemoglobin: 7.9 g/dL (01-20-19 @ 23:57)  WBC Count: 7.76 K/uL (01-20-19 @ 23:57)  Platelet Count - Automated: 179 K/uL (01-20-19 @ 00:36)  Hemoglobin: 8.3 g/dL (01-20-19 @ 00:36)  WBC Count: 11.06 K/uL (01-20-19 @ 00:36)  Hematocrit: 27.8 % (01-20-19 @ 00:36)  Hemoglobin: 8.0 g/dL (01-18-19 @ 22:54)  WBC Count: 11.31 K/uL (01-18-19 @ 22:54)  Hematocrit: 27.2 % (01-18-19 @ 22:54)  Platelet Count - Automated: 158 K/uL (01-18-19 @ 22:54)  Hematocrit: 27.5 % (01-18-19 @ 11:00)  Platelet Count - Automated: 152 K/uL (01-18-19 @ 11:00)  Hemoglobin: 8.2 g/dL (01-18-19 @ 11:00)  WBC Count: 8.43 K/uL (01-18-19 @ 11:00)  Platelet Count - Automated: 147 K/uL (01-18-19 @ 04:40)  Hemoglobin: 8.4 g/dL (01-18-19 @ 04:40)  WBC Count: 7.51 K/uL (01-18-19 @ 04:40)  Hematocrit: 28.0 % (01-18-19 @ 04:40)  Hematocrit: 23.4 % (01-17-19 @ 23:35)  Platelet Count - Automated: 155 K/uL (01-17-19 @ 23:35)  Hemoglobin: 6.8 g/dL (01-17-19 @ 23:35)  WBC Count: 9.17 K/uL (01-17-19 @ 23:35)  Hemoglobin: 8.1 g/dL (01-17-19 @ 18:30)  WBC Count: 12.99 K/uL (01-17-19 @ 18:30)  Platelet Count - Automated: 232 K/uL (01-17-19 @ 18:30)  Hematocrit: 28.1 % (01-17-19 @ 18:30)  Hematocrit: 28.3 % (01-16-19 @ 23:51)  WBC Count: 8.96 K/uL (01-16-19 @ 23:51)  Platelet Count - Automated: 185 K/uL (01-16-19 @ 23:51)  Hemoglobin: 8.1 g/dL (01-16-19 @ 23:51)  Hematocrit: 28.1 % (01-15-19 @ 23:54)  Platelet Count - Automated: 193 K/uL (01-15-19 @ 23:54)  Hemoglobin: 8.4 g/dL (01-15-19 @ 23:54)  WBC Count: 7.66 K/uL (01-15-19 @ 23:54)  WBC Count: 10.39 K/uL (01-15-19 @ 10:30)  Hematocrit: 31.7 % (01-15-19 @ 10:30)  Platelet Count - Automated: 252 K/uL (01-15-19 @ 10:30)  Hemoglobin: 9.2 g/dL (01-15-19 @ 10:30)                BLOOD SMEAR INTERPRETATION:       RADIOLOGY & ADDITIONAL STUDIES:

## 2019-01-22 NOTE — PHYSICAL THERAPY INITIAL EVALUATION ADULT - PERTINENT HX OF CURRENT PROBLEM, REHAB EVAL
73y Female PMH of DMT2, CHF, Ovarian CA s/p BARON and BSO 2y ago, gastric CA s/p chemo follows at [facility] Oncologist Dr. Pratt, has chronically incarcerated ventral hernia containing loops of small bowel, presents with abdominal pain that has progressively worsened over 1 week with associated nausea, decreased appetite, and infrequent, small BM, no flatus

## 2019-01-23 ENCOUNTER — TRANSCRIPTION ENCOUNTER (OUTPATIENT)
Age: 74
End: 2019-01-23

## 2019-01-23 VITALS — WEIGHT: 166.01 LBS

## 2019-01-23 LAB
ANION GAP SERPL CALC-SCNC: 18 MMOL/L — HIGH (ref 7–14)
BASOPHILS # BLD AUTO: 0.02 K/UL — SIGNIFICANT CHANGE UP (ref 0–0.2)
BASOPHILS NFR BLD AUTO: 0.3 % — SIGNIFICANT CHANGE UP (ref 0–1)
BUN SERPL-MCNC: 17 MG/DL — SIGNIFICANT CHANGE UP (ref 10–20)
CALCIUM SERPL-MCNC: 8.9 MG/DL — SIGNIFICANT CHANGE UP (ref 8.5–10.1)
CHLORIDE SERPL-SCNC: 99 MMOL/L — SIGNIFICANT CHANGE UP (ref 98–110)
CO2 SERPL-SCNC: 19 MMOL/L — SIGNIFICANT CHANGE UP (ref 17–32)
CREAT SERPL-MCNC: 1 MG/DL — SIGNIFICANT CHANGE UP (ref 0.7–1.5)
EOSINOPHIL # BLD AUTO: 0.12 K/UL — SIGNIFICANT CHANGE UP (ref 0–0.7)
EOSINOPHIL NFR BLD AUTO: 1.5 % — SIGNIFICANT CHANGE UP (ref 0–8)
GLUCOSE BLDC GLUCOMTR-MCNC: 163 MG/DL — HIGH (ref 70–99)
GLUCOSE BLDC GLUCOMTR-MCNC: 250 MG/DL — HIGH (ref 70–99)
GLUCOSE BLDC GLUCOMTR-MCNC: 95 MG/DL — SIGNIFICANT CHANGE UP (ref 70–99)
GLUCOSE SERPL-MCNC: 224 MG/DL — HIGH (ref 70–99)
HCT VFR BLD CALC: 26.5 % — LOW (ref 37–47)
HGB BLD-MCNC: 8 G/DL — LOW (ref 12–16)
IMM GRANULOCYTES NFR BLD AUTO: 2 % — HIGH (ref 0.1–0.3)
LYMPHOCYTES # BLD AUTO: 2.54 K/UL — SIGNIFICANT CHANGE UP (ref 1.2–3.4)
LYMPHOCYTES # BLD AUTO: 31.9 % — SIGNIFICANT CHANGE UP (ref 20.5–51.1)
MAGNESIUM SERPL-MCNC: 1.6 MG/DL — LOW (ref 1.8–2.4)
MCHC RBC-ENTMCNC: 26.3 PG — LOW (ref 27–31)
MCHC RBC-ENTMCNC: 30.2 G/DL — LOW (ref 32–37)
MCV RBC AUTO: 87.2 FL — SIGNIFICANT CHANGE UP (ref 81–99)
MONOCYTES # BLD AUTO: 0.63 K/UL — HIGH (ref 0.1–0.6)
MONOCYTES NFR BLD AUTO: 7.9 % — SIGNIFICANT CHANGE UP (ref 1.7–9.3)
NEUTROPHILS # BLD AUTO: 4.48 K/UL — SIGNIFICANT CHANGE UP (ref 1.4–6.5)
NEUTROPHILS NFR BLD AUTO: 56.4 % — SIGNIFICANT CHANGE UP (ref 42.2–75.2)
NRBC # BLD: 0 /100 WBCS — SIGNIFICANT CHANGE UP (ref 0–0)
PHOSPHATE SERPL-MCNC: 3 MG/DL — SIGNIFICANT CHANGE UP (ref 2.1–4.9)
PLATELET # BLD AUTO: 173 K/UL — SIGNIFICANT CHANGE UP (ref 130–400)
POTASSIUM SERPL-MCNC: 4.3 MMOL/L — SIGNIFICANT CHANGE UP (ref 3.5–5)
POTASSIUM SERPL-SCNC: 4.3 MMOL/L — SIGNIFICANT CHANGE UP (ref 3.5–5)
RBC # BLD: 3.04 M/UL — LOW (ref 4.2–5.4)
RBC # FLD: 17.8 % — HIGH (ref 11.5–14.5)
SODIUM SERPL-SCNC: 136 MMOL/L — SIGNIFICANT CHANGE UP (ref 135–146)
WBC # BLD: 7.95 K/UL — SIGNIFICANT CHANGE UP (ref 4.8–10.8)
WBC # FLD AUTO: 7.95 K/UL — SIGNIFICANT CHANGE UP (ref 4.8–10.8)

## 2019-01-23 PROCEDURE — 99024 POSTOP FOLLOW-UP VISIT: CPT

## 2019-01-23 RX ORDER — ERYTHROPOIETIN 10000 [IU]/ML
0 INJECTION, SOLUTION INTRAVENOUS; SUBCUTANEOUS
Qty: 0 | Refills: 0 | COMMUNITY
Start: 2019-01-23

## 2019-01-23 RX ORDER — AMIODARONE HYDROCHLORIDE 400 MG/1
1 TABLET ORAL
Qty: 0 | Refills: 0 | COMMUNITY
Start: 2019-01-23

## 2019-01-23 RX ORDER — MAGNESIUM SULFATE 500 MG/ML
2 VIAL (ML) INJECTION ONCE
Qty: 0 | Refills: 0 | Status: COMPLETED | OUTPATIENT
Start: 2019-01-23 | End: 2019-01-23

## 2019-01-23 RX ORDER — LISINOPRIL 2.5 MG/1
1 TABLET ORAL
Qty: 0 | Refills: 0 | COMMUNITY
Start: 2019-01-23

## 2019-01-23 RX ORDER — AMIODARONE HYDROCHLORIDE 400 MG/1
1 TABLET ORAL
Qty: 60 | Refills: 0 | OUTPATIENT
Start: 2019-01-23

## 2019-01-23 RX ORDER — BACITRACIN ZINC 500 UNIT/G
1 OINTMENT IN PACKET (EA) TOPICAL
Qty: 1 | Refills: 0 | OUTPATIENT
Start: 2019-01-23 | End: 2019-01-29

## 2019-01-23 RX ORDER — ACETAMINOPHEN 500 MG
2 TABLET ORAL
Qty: 0 | Refills: 0 | COMMUNITY
Start: 2019-01-23

## 2019-01-23 RX ORDER — ERYTHROPOIETIN 10000 [IU]/ML
40000 INJECTION, SOLUTION INTRAVENOUS; SUBCUTANEOUS ONCE
Qty: 0 | Refills: 0 | Status: COMPLETED | OUTPATIENT
Start: 2019-01-23 | End: 2019-01-23

## 2019-01-23 RX ADMIN — AMIODARONE HYDROCHLORIDE 200 MILLIGRAM(S): 400 TABLET ORAL at 18:08

## 2019-01-23 RX ADMIN — AMIODARONE HYDROCHLORIDE 200 MILLIGRAM(S): 400 TABLET ORAL at 05:49

## 2019-01-23 RX ADMIN — HEPARIN SODIUM 5000 UNIT(S): 5000 INJECTION INTRAVENOUS; SUBCUTANEOUS at 13:18

## 2019-01-23 RX ADMIN — CHLORHEXIDINE GLUCONATE 1 APPLICATION(S): 213 SOLUTION TOPICAL at 05:49

## 2019-01-23 RX ADMIN — LISINOPRIL 10 MILLIGRAM(S): 2.5 TABLET ORAL at 05:49

## 2019-01-23 RX ADMIN — Medication 50 GRAM(S): at 13:18

## 2019-01-23 RX ADMIN — METFORMIN HYDROCHLORIDE 1000 MILLIGRAM(S): 850 TABLET ORAL at 05:49

## 2019-01-23 RX ADMIN — Medication 650 MILLIGRAM(S): at 05:47

## 2019-01-23 RX ADMIN — Medication 1 SPRAY(S): at 01:35

## 2019-01-23 RX ADMIN — Medication 2: at 11:49

## 2019-01-23 RX ADMIN — CARVEDILOL PHOSPHATE 3.12 MILLIGRAM(S): 80 CAPSULE, EXTENDED RELEASE ORAL at 05:49

## 2019-01-23 RX ADMIN — PANTOPRAZOLE SODIUM 40 MILLIGRAM(S): 20 TABLET, DELAYED RELEASE ORAL at 05:48

## 2019-01-23 RX ADMIN — Medication 10 MILLIGRAM(S): at 08:39

## 2019-01-23 RX ADMIN — HEPARIN SODIUM 5000 UNIT(S): 5000 INJECTION INTRAVENOUS; SUBCUTANEOUS at 05:49

## 2019-01-23 RX ADMIN — ERYTHROPOIETIN 40000 UNIT(S): 10000 INJECTION, SOLUTION INTRAVENOUS; SUBCUTANEOUS at 18:47

## 2019-01-23 RX ADMIN — Medication 40 MILLIGRAM(S): at 05:49

## 2019-01-23 RX ADMIN — Medication 1 SPRAY(S): at 05:48

## 2019-01-23 RX ADMIN — Medication 650 MILLIGRAM(S): at 07:01

## 2019-01-23 RX ADMIN — Medication 1: at 08:38

## 2019-01-23 NOTE — DISCHARGE NOTE ADULT - CARE PROVIDER_API CALL
Mat Alvarez), Surgery  [address] [place] [address]  [address]rd [place] [address]  Phone: [phone]  Fax: [phone]    Alfonso Browne), Cardiovascular Disease; Internal Medicine; Interventional Cardiology  [address] [place] [address]  Phone: [phone]  Fax: [phone]

## 2019-01-23 NOTE — PROGRESS NOTE ADULT - SUBJECTIVE AND OBJECTIVE BOX
Patient is a 73y old  Female who presents with a chief complaint of Abdominal pain (23 Jan 2019 02:24)       Pt is seen and examined  pt is awake and out of bed to chair  pt seems comfortable and denies any complaints at this time          ROS:  Negative   MEDICATIONS  (STANDING):  amiodarone    Tablet 200 milliGRAM(s) Oral two times a day  carvedilol 3.125 milliGRAM(s) Oral every 12 hours  chlorhexidine 4% Liquid 1 Application(s) Topical <User Schedule>  dextrose 5%. 1000 milliLiter(s) (50 mL/Hr) IV Continuous <Continuous>  dextrose 50% Injectable 25 Gram(s) IV Push once  dextrose 50% Injectable 25 Gram(s) IV Push once  dextrose 50% Injectable 12.5 Gram(s) IV Push once  dextrose 50% Injectable 25 Gram(s) IV Push once  dextrose 50% Injectable 25 Gram(s) IV Push once  epoetin tara Injectable 98511 Unit(s) SubCutaneous once  furosemide    Tablet 40 milliGRAM(s) Oral daily  glipiZIDE XL 10 milliGRAM(s) Oral with breakfast  heparin  Injectable 5000 Unit(s) SubCutaneous every 8 hours  insulin lispro (HumaLOG) corrective regimen sliding scale   SubCutaneous three times a day before meals  lisinopril 10 milliGRAM(s) Oral daily  metFORMIN 1000 milliGRAM(s) Oral two times a day  pantoprazole    Tablet 40 milliGRAM(s) Oral before breakfast    MEDICATIONS  (PRN):  acetaminophen   Tablet .. 650 milliGRAM(s) Oral every 6 hours PRN Mild Pain (1 - 3)  dextrose 40% Gel 15 Gram(s) Oral once PRN Blood Glucose LESS THAN 70 milliGRAM(s)/deciliter  glucagon  Injectable 1 milliGRAM(s) IntraMuscular once PRN Glucose LESS THAN 70 milligrams/deciliter  ondansetron Injectable 4 milliGRAM(s) IV Push every 8 hours PRN Nausea and/or Vomiting  oxyCODONE    IR 5 milliGRAM(s) Oral every 6 hours PRN Severe Pain (7 - 10)  sodium chloride 0.65% Nasal 1 Spray(s) Both Nostrils every 4 hours PRN Nasal Congestion      Allergies    ciprofloxacin (Hives)  penicillin (Rash)    Intolerances        Vital Signs Last 24 Hrs  T(C): 35.6 (23 Jan 2019 16:00), Max: 36.7 (23 Jan 2019 00:00)  T(F): 96.1 (23 Jan 2019 16:00), Max: 98 (23 Jan 2019 00:00)  HR: 85 (23 Jan 2019 16:00) (72 - 86)  BP: 135/60 (23 Jan 2019 16:00) (109/58 - 137/75)  BP(mean): --  RR: 16 (23 Jan 2019 16:00) (16 - 18)  SpO2: --    PHYSICAL EXAM  General: adult in NAD  HEENT: clear oropharynx, anicteric sclera, pink conjunctiva  Neck: supple  CV: normal S1/S2 with no murmur rubs or gallops  Lungs: positive air movement b/l ant lungs,clear to auscultation, no wheezes, no rales  Abdomen: soft non-tender non-distended, no hepatosplenomegaly  Ext:b/l LE edema  Skin: no rashes and no petechiae  Neuro: alert and oriented X 4, no focal deficits  LABS:                          8.0    7.95  )-----------( 173      ( 23 Jan 2019 00:00 )             26.5         Mean Cell Volume : 87.2 fL  Mean Cell Hemoglobin : 26.3 pg  Mean Cell Hemoglobin Concentration : 30.2 g/dL  Auto Neutrophil # : 4.48 K/uL  Auto Lymphocyte # : 2.54 K/uL  Auto Monocyte # : 0.63 K/uL  Auto Eosinophil # : 0.12 K/uL  Auto Basophil # : 0.02 K/uL  Auto Neutrophil % : 56.4 %  Auto Lymphocyte % : 31.9 %  Auto Monocyte % : 7.9 %  Auto Eosinophil % : 1.5 %  Auto Basophil % : 0.3 %    Serial CBC's  01-23 @ 00:00  Hct-26.5 / Hgb-8.0 / Plat-173 / RBC-3.04 / WBC-7.95          Serial CBC's  01-22 @ 01:03  Hct-27.2 / Hgb-8.2 / Plat-183 / RBC-3.16 / WBC-8.12          Serial CBC's  01-21 @ 11:24  Hct-29.7 / Hgb-8.7 / Plat-179 / RBC-3.37 / WBC-8.57          Serial CBC's  01-20 @ 23:57  Hct-26.6 / Hgb-7.9 / Plat-162 / RBC-3.01 / WBC-7.76          Serial CBC's  01-20 @ 00:36  Hct-27.8 / Hgb-8.3 / Plat-179 / RBC-3.14 / WBC-11.06            01-23    136  |  99  |  17  ----------------------------<  224<H>  4.3   |  19  |  1.0    Ca    8.9      23 Jan 2019 00:00  Phos  3.0     01-23  Mg     1.6     01-23        PT/INR - ( 22 Jan 2019 01:03 )   PT: 13.20 sec;   INR: 1.15 ratio         PTT - ( 22 Jan 2019 01:03 )  PTT:44.6 sec    WBC Count: 7.95 K/uL (01-23-19 @ 00:00)  Platelet Count - Automated: 173 K/uL (01-23-19 @ 00:00)  Hematocrit: 26.5 % (01-23-19 @ 00:00)  Hemoglobin: 8.0 g/dL (01-23-19 @ 00:00)  Hemoglobin: 8.2 g/dL (01-22-19 @ 01:03)  WBC Count: 8.12 K/uL (01-22-19 @ 01:03)  Hematocrit: 27.2 % (01-22-19 @ 01:03)  Platelet Count - Automated: 183 K/uL (01-22-19 @ 01:03)  Hematocrit: 29.7 % (01-21-19 @ 11:24)  Platelet Count - Automated: 179 K/uL (01-21-19 @ 11:24)  Hemoglobin: 8.7 g/dL (01-21-19 @ 11:24)  WBC Count: 8.57 K/uL (01-21-19 @ 11:24)  Hematocrit: 26.6 % (01-20-19 @ 23:57)  Platelet Count - Automated: 162 K/uL (01-20-19 @ 23:57)  Hemoglobin: 7.9 g/dL (01-20-19 @ 23:57)  WBC Count: 7.76 K/uL (01-20-19 @ 23:57)  Platelet Count - Automated: 179 K/uL (01-20-19 @ 00:36)  Hemoglobin: 8.3 g/dL (01-20-19 @ 00:36)  WBC Count: 11.06 K/uL (01-20-19 @ 00:36)  Hematocrit: 27.8 % (01-20-19 @ 00:36)  Hemoglobin: 8.0 g/dL (01-18-19 @ 22:54)  WBC Count: 11.31 K/uL (01-18-19 @ 22:54)  Hematocrit: 27.2 % (01-18-19 @ 22:54)  Platelet Count - Automated: 158 K/uL (01-18-19 @ 22:54)  Hematocrit: 27.5 % (01-18-19 @ 11:00)  Platelet Count - Automated: 152 K/uL (01-18-19 @ 11:00)  Hemoglobin: 8.2 g/dL (01-18-19 @ 11:00)  WBC Count: 8.43 K/uL (01-18-19 @ 11:00)  Platelet Count - Automated: 147 K/uL (01-18-19 @ 04:40)  Hemoglobin: 8.4 g/dL (01-18-19 @ 04:40)  WBC Count: 7.51 K/uL (01-18-19 @ 04:40)  Hematocrit: 28.0 % (01-18-19 @ 04:40)  Hematocrit: 23.4 % (01-17-19 @ 23:35)  Platelet Count - Automated: 155 K/uL (01-17-19 @ 23:35)  Hemoglobin: 6.8 g/dL (01-17-19 @ 23:35)  WBC Count: 9.17 K/uL (01-17-19 @ 23:35)  Hemoglobin: 8.1 g/dL (01-17-19 @ 18:30)  WBC Count: 12.99 K/uL (01-17-19 @ 18:30)  Platelet Count - Automated: 232 K/uL (01-17-19 @ 18:30)  Hematocrit: 28.1 % (01-17-19 @ 18:30)  Hematocrit: 28.3 % (01-16-19 @ 23:51)  WBC Count: 8.96 K/uL (01-16-19 @ 23:51)  Platelet Count - Automated: 185 K/uL (01-16-19 @ 23:51)  Hemoglobin: 8.1 g/dL (01-16-19 @ 23:51)  Hematocrit: 28.1 % (01-15-19 @ 23:54)  Platelet Count - Automated: 193 K/uL (01-15-19 @ 23:54)  Hemoglobin: 8.4 g/dL (01-15-19 @ 23:54)  WBC Count: 7.66 K/uL (01-15-19 @ 23:54)  WBC Count: 10.39 K/uL (01-15-19 @ 10:30)  Hematocrit: 31.7 % (01-15-19 @ 10:30)  Platelet Count - Automated: 252 K/uL (01-15-19 @ 10:30)  Hemoglobin: 9.2 g/dL (01-15-19 @ 10:30)                BLOOD SMEAR INTERPRETATION:       RADIOLOGY & ADDITIONAL STUDIES:

## 2019-01-23 NOTE — PROVIDER CONTACT NOTE (OTHER) - ACTION/TREATMENT ORDERED:
d/c stool softeners and will collect a stool sample if another episode occurs
Continue with same scale, monitor for now

## 2019-01-23 NOTE — PROGRESS NOTE ADULT - SUBJECTIVE AND OBJECTIVE BOX
Progress Note: General Surgery  Patient: JILLIAN SEE , 73y (1945)Female   MRN: [id number]  Location: [address]  Visit: 01-15-19 Inpatient  Date: 01-23-19 @ 02:25    Procedure/Diagnosis: s/p repair of incarcerated ventral hernia w/ meshs/p repair of incarcerated ventral hernia w/ mesh    Events/ 24h: No acute events overnight. Pain controlled.    Vitals: T(F): 96 (01-22-19 @ 16:00), Max: 97.8 (01-22-19 @ 03:30)  HR: 86 (01-22-19 @ 17:25)  BP: 137/75 (01-22-19 @ 17:25) (111/59 - 137/75)  RR: 18 (01-22-19 @ 16:00)  SpO2: --    In:   01-21-19 @ 07:01  -  01-22-19 @ 07:00  --------------------------------------------------------  IN: 340 mL    01-22-19 @ 07:01  -  01-23-19 @ 02:25  --------------------------------------------------------  IN: 420 mL      Out:   01-21-19 @ 07:01 - 01-22-19 @ 07:00  --------------------------------------------------------  OUT:    Bulb: 75 mL    Indwelling Catheter - Urethral: 270 mL    Voided: 200 mL  Total OUT: 545 mL      01-22-19 @ 07:01  -  01-23-19 @ 02:25  --------------------------------------------------------  OUT:    Bulb: 50 mL    Voided: 200 mL  Total OUT: 250 mL        Net:   01-21-19 @ 07:01  -  01-22-19 @ 07:00  --------------------------------------------------------  NET: -205 mL    01-22-19 @ 07:01  -  01-23-19 @ 02:25  --------------------------------------------------------  NET: 170 mL        Diet: Diet, Consistent Carbohydrate w/Evening Snack (01-22-19 @ 17:08)    IV Fluids: dextrose 5%. 1000 milliLiter(s) (50 mL/Hr) IV Continuous <Continuous>      Physical Examination:  General Appearance: NAD  HEENT: EOMI, sclera non-icteric.  Heart: RRR   Lungs: CTABL.   Abdomen:  JULIÁN serosang. Soft, nontender, nondistended. Incision c/d/i  MSK/Extremities: Warm & well-perfused. Peripheral pulses intact.  Skin: Warm, dry. No jaundice.       Medications: [Standing]  amiodarone    Tablet 200 milliGRAM(s) Oral two times a day  carvedilol 3.125 milliGRAM(s) Oral every 12 hours  chlorhexidine 4% Liquid 1 Application(s) Topical <User Schedule>  dextrose 5%. 1000 milliLiter(s) (50 mL/Hr) IV Continuous <Continuous>  dextrose 50% Injectable 25 Gram(s) IV Push once  dextrose 50% Injectable 25 Gram(s) IV Push once  dextrose 50% Injectable 12.5 Gram(s) IV Push once  dextrose 50% Injectable 25 Gram(s) IV Push once  dextrose 50% Injectable 25 Gram(s) IV Push once  docusate sodium 100 milliGRAM(s) Oral three times a day  furosemide    Tablet 40 milliGRAM(s) Oral daily  glipiZIDE XL 10 milliGRAM(s) Oral with breakfast  heparin  Injectable 5000 Unit(s) SubCutaneous every 8 hours  insulin lispro (HumaLOG) corrective regimen sliding scale   SubCutaneous three times a day before meals  lisinopril 10 milliGRAM(s) Oral daily  metFORMIN 1000 milliGRAM(s) Oral two times a day  pantoprazole    Tablet 40 milliGRAM(s) Oral before breakfast    DVT Prophylaxis: heparin  Injectable 5000 Unit(s) SubCutaneous every 8 hours    GI Prophylaxis: pantoprazole    Tablet 40 milliGRAM(s) Oral before breakfast    Antibiotics:   Anticoagulation:   Medications:[PRN]  acetaminophen   Tablet .. 650 milliGRAM(s) Oral every 6 hours PRN  dextrose 40% Gel 15 Gram(s) Oral once PRN  glucagon  Injectable 1 milliGRAM(s) IntraMuscular once PRN  ondansetron Injectable 4 milliGRAM(s) IV Push every 8 hours PRN  oxyCODONE    IR 5 milliGRAM(s) Oral every 6 hours PRN  sodium chloride 0.65% Nasal 1 Spray(s) Both Nostrils every 4 hours PRN      Labs:                        8.0    7.95  )-----------( 173      ( 23 Jan 2019 00:00 )             26.5     01-23    136  |  99  |  17  ----------------------------<  224<H>  4.3   |  19  |  1.0    Ca    8.9      23 Jan 2019 00:00  Phos  3.0     01-23  Mg     1.6     01-23        PT/INR - ( 22 Jan 2019 01:03 )   PT: 13.20 sec;   INR: 1.15 ratio         PTT - ( 22 Jan 2019 01:03 )  PTT:44.6 sec        Imaging:     < from: Xray Chest 1 View- PORTABLE-Routine (01.22.19 @ 08:36) >  Cardiomegalywith atelectasis.    Follow-up as needed.    < end of copied text >      Assessment:  73y Female patient admitted s/p repair of incarcerated ventral hernia w/ mesh    Plan:    CC diet  IVL  PT/rehab/SW for SNF  DVT/GI ppx  OOBAT  IS  Pain control    Date/Time: 01-23-19 @ 02:25

## 2019-01-23 NOTE — PROGRESS NOTE ADULT - ASSESSMENT
Assesment :  1-  s/p  SBO  sec  to  abd  hernia   recovering  from surgey  well     2-Anemia    stable     no  need  for  blood  tfx.   cont procrit 40,000 unit qweekly as outpt  transfuse to keep hb >7      3-H/O Ovarian cancer  s/p  surgery   and  chemotherapy   in Remission     will need MRCP to evaluate pancreatic lesion, can be done as outpt when acute issue is resolved.  cont other supportive care as per Sx.  will f/u  d/c plan as per primary team

## 2019-01-23 NOTE — DISCHARGE NOTE ADULT - ADDITIONAL INSTRUCTIONS
1.Please followup in office with Dr Alvarez in 1 week  2.Please followup with sairac and cardiologist in office   3.Call office for any questions or concerns.

## 2019-01-23 NOTE — PROGRESS NOTE ADULT - ATTENDING COMMENTS
Patient seen and examined with surgery PA in ICU on rounds and discussed management plans. Still moderate pain , started on po diet, dressing dry abd binder in place, JULIÁN serosanguinous
Stable and afebriel.  Glucose under betetr control.  Tolerated CC diet.  JULIÁN SS fluid.  Wound intact.  Binder in place.    a/p:  Progressing well.  CC diet and glucose control.  SW working on d/c planning to SNF.  Will go to SNF with JULIÁN drain.
Stable and afebrile.  JULIÁN SS fluid.  wound intact.  Tolerates PO. passing gas.  Glucose under better control.    a/p:  Progressed well.  Clinically ready for discharge.  SW working on rehab / SNF.  Followup in my office in 1 week.  D/C to rehab with JULIÁN drain.
Stable and afebrile.  No abdominal pian.  Incarcerated hernia with normal WC and no pian.  NGT with minimal drainage.    Abdomen with incarcerated hernia, NT. Mildly distended.    a/p:  Repair of incarcerated incisional hernia possible mes insertion and possible exploratory. laparotomy. Risks, benefits, alternatives and consequences were fully explained to her and to Malinda, her niece.
Stable and afebrile.  No changes in her abdominal exam.  Surgery today.  Consent in chart.  Patient agrees with plan and surgery.
stable and afebrile.  tolerates PO.  JULIÁN SS fluid.  Wound intact.    a/p:  progressed well.  d/c to Rehab, SNF.  Followup in my office in 2 weeks.  Plan of care explained to her and Malinda (Niece)
stable and afebriel.  Tachycardic and labile BP.  NGT removed today.  JULIÁN SS fluid.  Transfused last night.  Dressing dry.    a/p:    Progressing well.  Sips of water and PO mends.  OOB, IS, IVF.  Continue care in the SICU. D/w Dr. Bunch.
74yo female admitted for incarcerated ventral hernia with small bowel obstruction POD#3 s/p repair of ventral hernia with mesh. In ICU for insulin drip at this time. Patient complains of headache. Olson was replaced last night for retention. Ambulating moderately. +flatus, no BM. Pain is controlled. Tolerating clears. Continue present ICU management, monitor for BM. Encourage ambulation. Remove olson once possible.
reviewed and revised above  convert all meds to po, and continue cardiac monitoring
pt s/p surgery w/ ectopy w/ plan for amiodarone.
pt w/o cp. sinus post surgery.
reviewed and revised above  remains borderline hypotensive, but self-limited or responsive to fluids  insuin gtt, will wean today
reviewed and revised above  pt remains hypotensive though responded to boluses, map >60  holding home meds  remains at risk of cardiovascular deterioration
doing well   tolerating diet and having BM   OOBTC   IVL , advance diet   transfer to floor   resume home meds

## 2019-01-23 NOTE — PROGRESS NOTE ADULT - REASON FOR ADMISSION
Abdominal pain

## 2019-01-23 NOTE — DISCHARGE NOTE ADULT - CARE PLAN
Principal Discharge DX:	Small bowel obstruction  Goal:	full recovery  Assessment and plan of treatment:	patient came with symptoms of SBO, with incarcerated ventral hernia, surgical repair planned after failed surgical management.

## 2019-01-23 NOTE — DISCHARGE NOTE ADULT - CARE PROVIDERS DIRECT ADDRESSES
[email or web address]

## 2019-01-23 NOTE — PROGRESS NOTE ADULT - PROVIDER SPECIALTY LIST ADULT
Cardiology
Cardiology
Heme/Onc
SICU
Surgery
Hospitalist

## 2019-01-23 NOTE — DISCHARGE NOTE ADULT - PLAN OF CARE
full recovery patient came with symptoms of SBO, with incarcerated ventral hernia, surgical repair planned after failed surgical management.

## 2019-01-23 NOTE — DISCHARGE NOTE ADULT - HOSPITAL COURSE
73y Female w/ CHF EF 35, DM, chronic anemia (Hg ~8-9, gets procrit), active gastric cancer on chemo at [facility] Dr. Pratt, hx of ovarian ca/ s/p hysterectomy and oophorectomy 2 years ago presents for partial sbo 2/2 to  ventral hernia;  s/p repair w/ mesh and removal of adhesions on 1/17/2019. after optimization by cardio for CHF.   Pt admitted to the SICU for concern of tacchy 120s and temp 100.9.  Pt received 2 doses of clindamycin and then dc, since pt was never febrile again.  Pt started to have paroxysxymal of ectopic pacs v. afib which resolved on their own.  Spoke to Dr. Browne her cardiologist- pt was started on amiodarone. Pt NOT anticoagulated-- needs to be follow up with Dr. Browne.  FS control.   patients diet advanced as tolerated, patient seen and examined, stable now, tolerating diet.  advised to followup with hemeonc and cardiologist and Dr figueroa in the office. patient has a aviva drain, drain care needed and to be done in the nursing home.

## 2019-01-23 NOTE — DISCHARGE NOTE ADULT - PATIENT PORTAL LINK FT
You can access the [facility] Patient Portal, offered by [facility], by registering with the following website: [email or web address]

## 2019-01-23 NOTE — DISCHARGE NOTE ADULT - MEDICATION SUMMARY - MEDICATIONS TO TAKE
I will START or STAY ON the medications listed below when I get home from the hospital:    acetaminophen 325 mg oral tablet  -- 2 tab(s) by mouth every 6 hours, As needed, Mild Pain (1 - 3)  -- Indication: For pain     lisinopril 10 mg oral tablet  -- 1 tab(s) by mouth once a day  -- Indication: For htn     benazepril 10 mg oral tablet  -- 1 tab(s) by mouth once a day  -- Indication: For homemed    amiodarone 200 mg oral tablet  -- 1 tab(s) by mouth 2 times a day  -- Indication: For afib    GlipiZIDE XL 10 mg oral tablet, extended release  -- 1 tab(s) by mouth once a day  -- Indication: For diabetes     Levemir FlexTouch  -- Indication: For diabetes    metFORMIN 1000 mg oral tablet  -- 1 tab(s) by mouth 2 times a day  -- Indication: For diabetes    ALPRAZOLAM .25 MG TABS  -- 1  by mouth , As Needed  -- Indication: For home med    CARVEDILOL 3.125 MG TABS  -- 1  by mouth every 12 hours  -- Indication: For cardiac medication     Baciguent 500 units/g topical ointment  -- Apply on skin to affected area 2 times a day   -- For external use only.    -- Indication: For Skin rash    FUROSEMIDE 40 MG TABS  -- 1  by mouth once a day  -- Indication: For homemed    epoetin tara  -- Indication: For anemia

## 2019-01-24 ENCOUNTER — CHART COPY (OUTPATIENT)
Age: 74
End: 2019-01-24

## 2019-01-24 PROBLEM — Z00.00 ENCOUNTER FOR PREVENTIVE HEALTH EXAMINATION: Status: ACTIVE | Noted: 2019-01-24

## 2019-01-25 ENCOUNTER — OUTPATIENT (OUTPATIENT)
Dept: OUTPATIENT SERVICES | Facility: HOSPITAL | Age: 74
LOS: 1 days | Discharge: HOME | End: 2019-01-25

## 2019-01-25 DIAGNOSIS — E11.9 TYPE 2 DIABETES MELLITUS WITHOUT COMPLICATIONS: ICD-10-CM

## 2019-01-25 DIAGNOSIS — Z88.0 ALLERGY STATUS TO PENICILLIN: ICD-10-CM

## 2019-01-25 DIAGNOSIS — I49.1 ATRIAL PREMATURE DEPOLARIZATION: ICD-10-CM

## 2019-01-25 DIAGNOSIS — Z85.43 PERSONAL HISTORY OF MALIGNANT NEOPLASM OF OVARY: ICD-10-CM

## 2019-01-25 DIAGNOSIS — Z90.79 ACQUIRED ABSENCE OF OTHER GENITAL ORGAN(S): ICD-10-CM

## 2019-01-25 DIAGNOSIS — R00.0 TACHYCARDIA, UNSPECIFIED: ICD-10-CM

## 2019-01-25 DIAGNOSIS — S01.511A LACERATION WITHOUT FOREIGN BODY OF LIP, INITIAL ENCOUNTER: ICD-10-CM

## 2019-01-25 DIAGNOSIS — I34.0 NONRHEUMATIC MITRAL (VALVE) INSUFFICIENCY: ICD-10-CM

## 2019-01-25 DIAGNOSIS — J44.9 CHRONIC OBSTRUCTIVE PULMONARY DISEASE, UNSPECIFIED: ICD-10-CM

## 2019-01-25 DIAGNOSIS — K56.609 UNSPECIFIED INTESTINAL OBSTRUCTION, UNSPECIFIED AS TO PARTIAL VERSUS COMPLETE OBSTRUCTION: ICD-10-CM

## 2019-01-25 DIAGNOSIS — Z28.21 IMMUNIZATION NOT CARRIED OUT BECAUSE OF PATIENT REFUSAL: ICD-10-CM

## 2019-01-25 DIAGNOSIS — K43.0 INCISIONAL HERNIA WITH OBSTRUCTION, WITHOUT GANGRENE: ICD-10-CM

## 2019-01-25 DIAGNOSIS — I27.20 PULMONARY HYPERTENSION, UNSPECIFIED: ICD-10-CM

## 2019-01-25 DIAGNOSIS — I25.10 ATHEROSCLEROTIC HEART DISEASE OF NATIVE CORONARY ARTERY WITHOUT ANGINA PECTORIS: ICD-10-CM
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
## 2019-01-25 DIAGNOSIS — I95.9 HYPOTENSION, UNSPECIFIED: ICD-10-CM

## 2019-01-25 DIAGNOSIS — I42.9 CARDIOMYOPATHY, UNSPECIFIED: ICD-10-CM

## 2019-01-25 DIAGNOSIS — Z85.028 PERSONAL HISTORY OF OTHER MALIGNANT NEOPLASM OF STOMACH: ICD-10-CM

## 2019-01-25 DIAGNOSIS — Z90.710 ACQUIRED ABSENCE OF BOTH CERVIX AND UTERUS: ICD-10-CM

## 2019-01-25 DIAGNOSIS — Z92.21 PERSONAL HISTORY OF ANTINEOPLASTIC CHEMOTHERAPY: ICD-10-CM

## 2019-01-25 DIAGNOSIS — I48.0 PAROXYSMAL ATRIAL FIBRILLATION: ICD-10-CM

## 2019-01-25 DIAGNOSIS — K86.89 OTHER SPECIFIED DISEASES OF PANCREAS: ICD-10-CM

## 2019-01-25 DIAGNOSIS — K66.0 PERITONEAL ADHESIONS (POSTPROCEDURAL) (POSTINFECTION): ICD-10-CM

## 2019-01-25 DIAGNOSIS — Z88.1 ALLERGY STATUS TO OTHER ANTIBIOTIC AGENTS STATUS: ICD-10-CM

## 2019-01-25 DIAGNOSIS — Z90.722 ACQUIRED ABSENCE OF OVARIES, BILATERAL: ICD-10-CM

## 2019-01-25 DIAGNOSIS — I50.22 CHRONIC SYSTOLIC (CONGESTIVE) HEART FAILURE: ICD-10-CM

## 2019-01-25 DIAGNOSIS — D64.9 ANEMIA, UNSPECIFIED: ICD-10-CM

## 2019-01-25 DIAGNOSIS — Z79.4 LONG TERM (CURRENT) USE OF INSULIN: ICD-10-CM
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
## 2019-01-25 DIAGNOSIS — F03.90 UNSPECIFIED DEMENTIA WITHOUT BEHAVIORAL DISTURBANCE: ICD-10-CM

## 2019-01-29 ENCOUNTER — OUTPATIENT (OUTPATIENT)
Dept: OUTPATIENT SERVICES | Facility: HOSPITAL | Age: 74
LOS: 1 days | Discharge: HOME | End: 2019-01-29

## 2019-01-29 DIAGNOSIS — R19.5 OTHER FECAL ABNORMALITIES: ICD-10-CM

## 2019-01-29 DIAGNOSIS — A09 INFECTIOUS GASTROENTERITIS AND COLITIS, UNSPECIFIED: ICD-10-CM

## 2019-01-29 DIAGNOSIS — I50.9 HEART FAILURE, UNSPECIFIED: ICD-10-CM

## 2019-01-31 ENCOUNTER — OUTPATIENT (OUTPATIENT)
Dept: OUTPATIENT SERVICES | Facility: HOSPITAL | Age: 74
LOS: 1 days | Discharge: HOME | End: 2019-01-31

## 2019-01-31 DIAGNOSIS — R94.5 ABNORMAL RESULTS OF LIVER FUNCTION STUDIES: ICD-10-CM

## 2019-02-04 ENCOUNTER — APPOINTMENT (OUTPATIENT)
Dept: SURGERY | Facility: CLINIC | Age: 74
End: 2019-02-04
Payer: MEDICARE

## 2019-02-04 VITALS
HEART RATE: 82 BPM | BODY MASS INDEX: 31.04 KG/M2 | DIASTOLIC BLOOD PRESSURE: 70 MMHG | SYSTOLIC BLOOD PRESSURE: 138 MMHG | TEMPERATURE: 98 F | HEIGHT: 59 IN | WEIGHT: 154 LBS

## 2019-02-04 DIAGNOSIS — C56.9 MALIGNANT NEOPLASM OF UNSPECIFIED OVARY: ICD-10-CM

## 2019-02-04 PROCEDURE — 99024 POSTOP FOLLOW-UP VISIT: CPT

## 2019-02-06 NOTE — ASSESSMENT
[FreeTextEntry1] : 72 yo female patient who was recently admitted with incarcerated incisional hernia. She underwent incisional hernia repair with mesh in January 2019 with no complications. She went to SNF and she comes today for follow-up. JULIÁN draining serous fluid 20 cc / day. Today for recommendations and followup.\par \par Assessment and Plan:\par \par Progressed well postop.\par JULIÁN removed today in my office.\par Staples removed and Steri-Strips applied.\par \par Return for follow-up as needed.\par \par All the questions were answered to their satisfaction and I encouraged the patient to call my office at anytime if she had any questions. Plan of care fully discussed with the patient. \par \par

## 2019-02-06 NOTE — PHYSICAL EXAM
[Normal] : supple, no neck mass and thyroid not enlarged [Normal] : oriented to person, place and time, with appropriate affect [de-identified] : lower midline wound is clean, dry and intact. Staples removed. Drain removed. Dressing applied.

## 2019-02-25 ENCOUNTER — APPOINTMENT (OUTPATIENT)
Dept: SURGERY | Facility: CLINIC | Age: 74
End: 2019-02-25

## 2019-03-18 ENCOUNTER — APPOINTMENT (OUTPATIENT)
Dept: SURGERY | Facility: CLINIC | Age: 74
End: 2019-03-18

## 2021-03-01 NOTE — ED ADULT TRIAGE NOTE - NS ED TRIAGE AVPU SCALE
[FreeTextEntry1] : The patient is stable clinically and today she had an uneventful removal of her double-J stent.  We discussed the importance of increased fluid intake and intake of citrus products and limiting oxalate sources and sodium in her diet.  The patient will come back in 1 month and at that time we will set her up for the Litholink test to assess for stone forming risk factors.\par 
Alert-The patient is alert, awake and responds to voice. The patient is oriented to time, place, and person. The triage nurse is able to obtain subjective information.

## 2021-04-15 NOTE — PATIENT PROFILE ADULT - DIABETES EDUCATION
lymphadenopathy  LUNGS:  clear to auscultation bilaterally, No increased work of breathing, good air exchange, no crackles or wheezing  CARDIOVASCULAR:  Regular rate and rhythm, normal S1 and S2, no S3 or S4, and no significant murmurs, rubs or gallops noted. Normal apical impulse. ABDOMEN:  Normal active bowel sounds, soft, non-tender, non-distended, no masses palpated, no organomegally  MUSCULOSKELETAL:  Full range of motion noted. MENTAL STATUS: Awake, alert, oriented to name, place and time. NEUROLOGIC:  Cranial nerves II-XII are grossly intact. SKIN:  normal skin color, texture, turgor for age.     Data    CBC with Differential:    Lab Results   Component Value Date    WBC 19.1 04/15/2021    HGB 14.7 04/15/2021    HCT 42.7 04/15/2021     04/15/2021    MCV 82.1 04/15/2021    RDW 13.3 04/15/2021    BANDSPCT 1 04/14/2021    LYMPHOPCT 6.0 04/15/2021    MONOPCT 10.0 04/15/2021    MYELOPCT 1 07/07/2020    EOSPCT 1.1 01/18/2011    BASOPCT 0.0 04/15/2021    MONOSABS 1.9 04/15/2021    LYMPHSABS 1.1 04/15/2021    EOSABS 0.0 04/15/2021    BASOSABS 0.0 04/15/2021    DIFFTYPE Auto 02/10/2013     BMP:    Lab Results   Component Value Date     04/15/2021    K 4.4 04/15/2021     04/15/2021    CO2 27 04/15/2021    BUN 18 04/15/2021    CREATININE 1.1 04/15/2021    GLUCOSE 237 04/15/2021    CALCIUM 8.9 04/15/2021    GFRAA >60 04/15/2021    GFRAA >60 02/10/2013    LABGLOM >60 04/15/2021     LFT:   Lab Results   Component Value Date    ALKPHOS 69 04/15/2021    ALT 11 04/15/2021    AST 9 04/15/2021    PROT 6.4 04/15/2021    PROT 8.3 11/12/2012    BILITOT 0.8 04/15/2021    BILIDIR 0.3 04/15/2021    IBILI 0.5 04/15/2021     Magnesium:    Lab Results   Component Value Date    MG 2.50 10/06/2020     Phosphorus:    Lab Results   Component Value Date    PHOS 4.0 07/09/2020     PT/INR:  No results found for: PTINR  U/A:    Lab Results   Component Value Date    LEUKOCYTESUR Negative 10/06/2020    WBCUA 2 therapy while in the hospital          DVT Prophylaxis: Lovenox  Diet: Diet NPO, After Midnight Exceptions are: Sips of Water with Meds, Sips of Clear Liquids  Code Status: Full Code    PT/OT Eval Status: Not Ordered    Dispo - Anticipated discharge date 1-2 days    Consuelo Little MD diabetes education

## 2024-01-25 NOTE — ED PROVIDER NOTE - DR. NAME
Tashi Hunter Additional Notes: Clear today.\\nPt to call if recurs will plan to w/i for evaluation. Render Risk Assessment In Note?: no Detail Level: Simple

## 2024-09-24 NOTE — PHYSICAL THERAPY INITIAL EVALUATION ADULT - ACTIVE RANGE OF MOTION EXAMINATION, REHAB EVAL
Patient reports pt here for GC-NTEducation provided to patient on todays US and directions to lab for NIPT.  SBAR given to MARQUIS COOK, see their note in Epic.      no Active ROM deficits were identified